# Patient Record
Sex: FEMALE | Race: WHITE | Employment: FULL TIME | URBAN - METROPOLITAN AREA
[De-identification: names, ages, dates, MRNs, and addresses within clinical notes are randomized per-mention and may not be internally consistent; named-entity substitution may affect disease eponyms.]

---

## 2017-04-05 ENCOUNTER — OFFICE VISIT (OUTPATIENT)
Dept: NEUROLOGY | Age: 37
End: 2017-04-05
Payer: MEDICARE

## 2017-04-05 VITALS
DIASTOLIC BLOOD PRESSURE: 92 MMHG | SYSTOLIC BLOOD PRESSURE: 130 MMHG | BODY MASS INDEX: 50.2 KG/M2 | WEIGHT: 249 LBS | HEART RATE: 64 BPM | HEIGHT: 59 IN

## 2017-04-05 DIAGNOSIS — R51.9 HEADACHE DISORDER: Primary | ICD-10-CM

## 2017-04-05 PROCEDURE — 99214 OFFICE O/P EST MOD 30 MIN: CPT | Performed by: PSYCHIATRY & NEUROLOGY

## 2017-04-05 RX ORDER — SUMATRIPTAN 25 MG/1
25 TABLET, FILM COATED ORAL
Qty: 9 TABLET | Refills: 3 | Status: SHIPPED | OUTPATIENT
Start: 2017-04-05 | End: 2017-05-26 | Stop reason: ALTCHOICE

## 2017-04-05 RX ORDER — TOPIRAMATE 50 MG/1
TABLET, FILM COATED ORAL
Qty: 135 TABLET | Refills: 3 | Status: SHIPPED | OUTPATIENT
Start: 2017-04-05 | End: 2017-05-16 | Stop reason: SDUPTHER

## 2017-05-16 RX ORDER — TOPIRAMATE 50 MG/1
75 TABLET, FILM COATED ORAL 2 TIMES DAILY
Qty: 270 TABLET | Refills: 0 | Status: SHIPPED | OUTPATIENT
Start: 2017-05-16 | End: 2018-09-05 | Stop reason: ALTCHOICE

## 2017-05-16 RX ORDER — PREDNISONE 20 MG/1
TABLET ORAL
Qty: 18 TABLET | Refills: 0 | Status: SHIPPED | OUTPATIENT
Start: 2017-05-16 | End: 2017-05-26 | Stop reason: ALTCHOICE

## 2017-05-26 ENCOUNTER — OFFICE VISIT (OUTPATIENT)
Dept: INTERNAL MEDICINE | Age: 37
End: 2017-05-26
Payer: MEDICARE

## 2017-05-26 VITALS
RESPIRATION RATE: 18 BRPM | SYSTOLIC BLOOD PRESSURE: 127 MMHG | WEIGHT: 250 LBS | HEART RATE: 74 BPM | HEIGHT: 58 IN | BODY MASS INDEX: 52.48 KG/M2 | TEMPERATURE: 98.1 F | DIASTOLIC BLOOD PRESSURE: 93 MMHG

## 2017-05-26 DIAGNOSIS — K21.9 GASTROESOPHAGEAL REFLUX DISEASE, ESOPHAGITIS PRESENCE NOT SPECIFIED: ICD-10-CM

## 2017-05-26 DIAGNOSIS — I10 ESSENTIAL HYPERTENSION: Primary | ICD-10-CM

## 2017-05-26 DIAGNOSIS — G44.229 CHRONIC TENSION-TYPE HEADACHE, NOT INTRACTABLE: ICD-10-CM

## 2017-05-26 DIAGNOSIS — R73.03 PREDIABETES: ICD-10-CM

## 2017-05-26 PROCEDURE — 99215 OFFICE O/P EST HI 40 MIN: CPT | Performed by: INTERNAL MEDICINE

## 2017-05-26 RX ORDER — RANITIDINE 150 MG/1
150 TABLET ORAL 2 TIMES DAILY
Qty: 60 TABLET | Refills: 3 | Status: SHIPPED | OUTPATIENT
Start: 2017-05-26 | End: 2018-09-05 | Stop reason: ALTCHOICE

## 2017-05-26 RX ORDER — HYDROCHLOROTHIAZIDE 12.5 MG/1
12.5 CAPSULE, GELATIN COATED ORAL DAILY
Qty: 30 CAPSULE | Refills: 5 | Status: SHIPPED | OUTPATIENT
Start: 2017-05-26 | End: 2018-08-31 | Stop reason: ALTCHOICE

## 2017-05-26 ASSESSMENT — ENCOUNTER SYMPTOMS
ALLERGIC/IMMUNOLOGIC NEGATIVE: 1
EYES NEGATIVE: 1
RESPIRATORY NEGATIVE: 1

## 2017-05-30 ENCOUNTER — HOSPITAL ENCOUNTER (OUTPATIENT)
Age: 37
Setting detail: SPECIMEN
Discharge: HOME | End: 2017-05-30
Payer: MEDICARE

## 2017-05-30 DIAGNOSIS — I10 ESSENTIAL HYPERTENSION: ICD-10-CM

## 2017-05-30 DIAGNOSIS — G44.229 CHRONIC TENSION-TYPE HEADACHE, NOT INTRACTABLE: ICD-10-CM

## 2017-05-30 DIAGNOSIS — K21.9 GASTROESOPHAGEAL REFLUX DISEASE, ESOPHAGITIS PRESENCE NOT SPECIFIED: ICD-10-CM

## 2017-05-30 DIAGNOSIS — R73.03 PREDIABETES: ICD-10-CM

## 2017-05-30 LAB
ANION GAP SERPL CALCULATED.3IONS-SCNC: 16 MMOL/L (ref 9–17)
BUN BLDV-MCNC: 15 MG/DL (ref 6–20)
BUN/CREAT BLD: ABNORMAL (ref 9–20)
CALCIUM SERPL-MCNC: 8.9 MG/DL (ref 8.6–10.4)
CHLORIDE BLD-SCNC: 100 MMOL/L (ref 98–107)
CHOLESTEROL/HDL RATIO: 3.7
CHOLESTEROL: 176 MG/DL
CO2: 18 MMOL/L (ref 20–31)
CREAT SERPL-MCNC: 0.54 MG/DL (ref 0.5–0.9)
ESTIMATED AVERAGE GLUCOSE: 88 MG/DL
GFR AFRICAN AMERICAN: >60 ML/MIN
GFR NON-AFRICAN AMERICAN: >60 ML/MIN
GFR SERPL CREATININE-BSD FRML MDRD: ABNORMAL ML/MIN/{1.73_M2}
GFR SERPL CREATININE-BSD FRML MDRD: ABNORMAL ML/MIN/{1.73_M2}
GLUCOSE BLD-MCNC: 100 MG/DL (ref 70–99)
HBA1C MFR BLD: 4.7 % (ref 4–6)
HDLC SERPL-MCNC: 47 MG/DL
LDL CHOLESTEROL: 88 MG/DL (ref 0–130)
POTASSIUM SERPL-SCNC: 3.5 MMOL/L (ref 3.7–5.3)
SODIUM BLD-SCNC: 134 MMOL/L (ref 135–144)
TRIGL SERPL-MCNC: 207 MG/DL
TSH SERPL DL<=0.05 MIU/L-ACNC: 1.94 MIU/L (ref 0.3–5)
VLDLC SERPL CALC-MCNC: ABNORMAL MG/DL (ref 1–30)

## 2017-05-30 PROCEDURE — 83036 HEMOGLOBIN GLYCOSYLATED A1C: CPT

## 2017-05-30 PROCEDURE — 36415 COLL VENOUS BLD VENIPUNCTURE: CPT

## 2017-05-30 PROCEDURE — 80048 BASIC METABOLIC PNL TOTAL CA: CPT

## 2017-05-30 PROCEDURE — 84443 ASSAY THYROID STIM HORMONE: CPT

## 2017-05-30 PROCEDURE — 80061 LIPID PANEL: CPT

## 2017-05-30 RX ORDER — POTASSIUM CHLORIDE 20 MEQ/1
20 TABLET, EXTENDED RELEASE ORAL DAILY
Qty: 30 TABLET | Refills: 3 | Status: SHIPPED | OUTPATIENT
Start: 2017-05-30 | End: 2017-06-23 | Stop reason: SDUPTHER

## 2017-06-23 ENCOUNTER — OFFICE VISIT (OUTPATIENT)
Dept: INTERNAL MEDICINE | Age: 37
End: 2017-06-23
Payer: MEDICARE

## 2017-06-23 VITALS
HEART RATE: 135 BPM | SYSTOLIC BLOOD PRESSURE: 122 MMHG | DIASTOLIC BLOOD PRESSURE: 89 MMHG | BODY MASS INDEX: 50.79 KG/M2 | RESPIRATION RATE: 18 BRPM | WEIGHT: 243 LBS

## 2017-06-23 DIAGNOSIS — E66.01 MORBID OBESITY DUE TO EXCESS CALORIES (HCC): ICD-10-CM

## 2017-06-23 DIAGNOSIS — I10 ESSENTIAL HYPERTENSION: Primary | ICD-10-CM

## 2017-06-23 PROCEDURE — 99213 OFFICE O/P EST LOW 20 MIN: CPT | Performed by: INTERNAL MEDICINE

## 2017-06-23 RX ORDER — POTASSIUM CHLORIDE 750 MG/1
10 TABLET, EXTENDED RELEASE ORAL DAILY
Qty: 30 TABLET | Refills: 11 | Status: SHIPPED | OUTPATIENT
Start: 2017-06-23 | End: 2018-08-31 | Stop reason: ALTCHOICE

## 2017-06-23 ASSESSMENT — ENCOUNTER SYMPTOMS
GASTROINTESTINAL NEGATIVE: 1
RESPIRATORY NEGATIVE: 1
EYES NEGATIVE: 1
ALLERGIC/IMMUNOLOGIC NEGATIVE: 1

## 2018-08-30 ENCOUNTER — HOSPITAL ENCOUNTER (EMERGENCY)
Age: 38
Discharge: HOME | End: 2018-08-30
Attending: EMERGENCY MEDICINE
Payer: MEDICARE

## 2018-08-30 ENCOUNTER — APPOINTMENT (OUTPATIENT)
Dept: GENERAL RADIOLOGY | Age: 38
End: 2018-08-30
Payer: MEDICARE

## 2018-08-30 VITALS
SYSTOLIC BLOOD PRESSURE: 137 MMHG | RESPIRATION RATE: 16 BRPM | OXYGEN SATURATION: 94 % | HEART RATE: 83 BPM | BODY MASS INDEX: 41.93 KG/M2 | WEIGHT: 208 LBS | TEMPERATURE: 98.2 F | HEIGHT: 59 IN | DIASTOLIC BLOOD PRESSURE: 87 MMHG

## 2018-08-30 DIAGNOSIS — S93.601A SPRAIN OF RIGHT FOOT, INITIAL ENCOUNTER: Primary | ICD-10-CM

## 2018-08-30 PROCEDURE — 99283 EMERGENCY DEPT VISIT LOW MDM: CPT

## 2018-08-30 PROCEDURE — 73630 X-RAY EXAM OF FOOT: CPT

## 2018-08-30 PROCEDURE — 6370000000 HC RX 637 (ALT 250 FOR IP): Performed by: PHYSICIAN ASSISTANT

## 2018-08-30 RX ORDER — IBUPROFEN 800 MG/1
800 TABLET ORAL EVERY 8 HOURS PRN
Qty: 20 TABLET | Refills: 0 | Status: SHIPPED | OUTPATIENT
Start: 2018-08-30 | End: 2018-09-05 | Stop reason: ALTCHOICE

## 2018-08-30 RX ORDER — IBUPROFEN 800 MG/1
800 TABLET ORAL ONCE
Status: COMPLETED | OUTPATIENT
Start: 2018-08-30 | End: 2018-08-30

## 2018-08-30 RX ADMIN — IBUPROFEN 800 MG: 800 TABLET ORAL at 19:51

## 2018-08-30 ASSESSMENT — ENCOUNTER SYMPTOMS
COLOR CHANGE: 0
ABDOMINAL PAIN: 0
WHEEZING: 0
COUGH: 0
NAUSEA: 0
VOMITING: 0

## 2018-08-30 ASSESSMENT — PAIN DESCRIPTION - FREQUENCY: FREQUENCY: CONTINUOUS

## 2018-08-30 ASSESSMENT — PAIN DESCRIPTION - ORIENTATION: ORIENTATION: RIGHT

## 2018-08-30 ASSESSMENT — PAIN SCALES - GENERAL
PAINLEVEL_OUTOF10: 4
PAINLEVEL_OUTOF10: 6

## 2018-08-30 ASSESSMENT — PAIN DESCRIPTION - LOCATION: LOCATION: FOOT

## 2018-08-30 ASSESSMENT — PAIN DESCRIPTION - DESCRIPTORS: DESCRIPTORS: SHARP

## 2018-08-30 ASSESSMENT — PAIN DESCRIPTION - PAIN TYPE: TYPE: ACUTE PAIN

## 2018-08-30 NOTE — ED PROVIDER NOTES
[facility]     Emergency Department     Faculty Attestation    I performed a history and physical examination of the patient and discussed management with the resident. I reviewed the residents note and agree with the documented findings including all diagnostic interpretations and plan of care. Any areas of disagreement are noted on the chart. I was personally present for the key portions of any procedures. I have documented in the chart those procedures where I was not present during the key portions. I have reviewed the emergency nurses triage note. I agree with the chief complaint, past medical history, past surgical history, allergies, medications, social and family history as documented unless otherwise noted below. Documentation of the HPI, Physical Exam and Medical Decision Making performed by scribkylie is based on my personal performance of the HPI, PE and MDM. For Physician Assistant/ Nurse Practitioner cases/documentation I have personally evaluated this patient and have completed at least one if not all key elements of the E/M (history, physical exam, and MDM). Additional findings are as noted. Primary Care Physician: Kyle William MD    History: This is a 40 y.o. female who presents to the Emergency Department with complaint of foot pain. Patient jumped out of her truck and landed on uneven ground twisting and falling towards her lright. No other injuries. Has been walking on the foot but has been increasingly painful and swelling. No blood thinners. Physical:     height is 4' 11\" (1.499 m) and weight is 208 lb (94.3 kg). Her oral temperature is 98.2 °F (36.8 °C). Her blood pressure is 137/87 and her pulse is 83. Her respiration is 16 and oxygen saturation is 94%.    40 y.o. female no acute distress, right foot shows tenderness, swelling over the calcaneus and lateral malleolus, DP pulse 2+, sensation intact in all 5 toes.   Able to wiggle toes without difficulty.     Impression: Foot injury    Plan: X-ray, analgesia      Era Cooper MD  Attending Emergency Physician        Jonathan Sarah MD  08/30/18 3884

## 2018-08-31 ENCOUNTER — OFFICE VISIT (OUTPATIENT)
Dept: INTERNAL MEDICINE | Age: 38
End: 2018-08-31
Payer: MEDICARE

## 2018-08-31 VITALS
BODY MASS INDEX: 52.01 KG/M2 | DIASTOLIC BLOOD PRESSURE: 88 MMHG | WEIGHT: 258 LBS | SYSTOLIC BLOOD PRESSURE: 152 MMHG | HEART RATE: 83 BPM | HEIGHT: 59 IN

## 2018-08-31 DIAGNOSIS — G62.9 NEUROPATHY: ICD-10-CM

## 2018-08-31 DIAGNOSIS — E66.01 OBESITIES, MORBID (HCC): ICD-10-CM

## 2018-08-31 DIAGNOSIS — R35.0 URINARY FREQUENCY: ICD-10-CM

## 2018-08-31 DIAGNOSIS — I10 ESSENTIAL HYPERTENSION: Primary | ICD-10-CM

## 2018-08-31 LAB
BILIRUBIN, POC: ABNORMAL
BLOOD URINE, POC: ABNORMAL
CLARITY, POC: ABNORMAL
COLOR, POC: YELLOW
GLUCOSE URINE, POC: ABNORMAL
HBA1C MFR BLD: 5.3 %
KETONES, POC: ABNORMAL
LEUKOCYTE EST, POC: ABNORMAL
NITRITE, POC: ABNORMAL
PH, POC: 6
PROTEIN, POC: ABNORMAL
SPECIFIC GRAVITY, POC: 1.02
UROBILINOGEN, POC: 1

## 2018-08-31 PROCEDURE — 1036F TOBACCO NON-USER: CPT | Performed by: STUDENT IN AN ORGANIZED HEALTH CARE EDUCATION/TRAINING PROGRAM

## 2018-08-31 PROCEDURE — G8417 CALC BMI ABV UP PARAM F/U: HCPCS | Performed by: STUDENT IN AN ORGANIZED HEALTH CARE EDUCATION/TRAINING PROGRAM

## 2018-08-31 PROCEDURE — 99211 OFF/OP EST MAY X REQ PHY/QHP: CPT | Performed by: INTERNAL MEDICINE

## 2018-08-31 PROCEDURE — 99214 OFFICE O/P EST MOD 30 MIN: CPT | Performed by: STUDENT IN AN ORGANIZED HEALTH CARE EDUCATION/TRAINING PROGRAM

## 2018-08-31 PROCEDURE — G8427 DOCREV CUR MEDS BY ELIG CLIN: HCPCS | Performed by: STUDENT IN AN ORGANIZED HEALTH CARE EDUCATION/TRAINING PROGRAM

## 2018-08-31 PROCEDURE — 83037 HB GLYCOSYLATED A1C HOME DEV: CPT | Performed by: STUDENT IN AN ORGANIZED HEALTH CARE EDUCATION/TRAINING PROGRAM

## 2018-08-31 PROCEDURE — 81002 URINALYSIS NONAUTO W/O SCOPE: CPT | Performed by: STUDENT IN AN ORGANIZED HEALTH CARE EDUCATION/TRAINING PROGRAM

## 2018-08-31 RX ORDER — LISINOPRIL 5 MG/1
5 TABLET ORAL DAILY
Qty: 30 TABLET | Refills: 3 | Status: SHIPPED | OUTPATIENT
Start: 2018-08-31 | End: 2018-09-19

## 2018-08-31 RX ORDER — CEPHALEXIN 500 MG/1
500 CAPSULE ORAL 2 TIMES DAILY
Qty: 14 CAPSULE | Refills: 0 | Status: SHIPPED | OUTPATIENT
Start: 2018-08-31 | End: 2018-09-05 | Stop reason: ALTCHOICE

## 2018-08-31 ASSESSMENT — PATIENT HEALTH QUESTIONNAIRE - PHQ9
2. FEELING DOWN, DEPRESSED OR HOPELESS: 0
1. LITTLE INTEREST OR PLEASURE IN DOING THINGS: 0
SUM OF ALL RESPONSES TO PHQ9 QUESTIONS 1 & 2: 0
SUM OF ALL RESPONSES TO PHQ QUESTIONS 1-9: 0
SUM OF ALL RESPONSES TO PHQ QUESTIONS 1-9: 0

## 2018-08-31 NOTE — PROGRESS NOTES
Visit Information    Have you changed or started any medications since your last visit including any over-the-counter medicines, vitamins, or herbal medicines? no   Are you having any side effects from any of your medications? -  no  Have you stopped taking any of your medications? Is so, why? -  yes - no refills    Have you seen any other physician or provider since your last visit? No  Have you had any other diagnostic tests since your last visit? Yes - Records Obtained  Have you been seen in the emergency room and/or had an admission to a hospital since we last saw you? Yes - Records Obtained  Have you had your routine dental cleaning in the past 6 months? no    Have you activated your [employer] account? If not, what are your barriers?  No: inactive     Patient Care Team:  Manav Magallon MD as PCP - General (Internal Medicine)    Medical History Review  Past Medical, Family, and Social History reviewed and does not contribute to the patient presenting condition    Health Maintenance   Topic Date Due    Cervical cancer screen  12/12/2002    Flu vaccine (1) 09/01/2018    DTaP/Tdap/Td vaccine (2 - Td) 06/30/2023    HIV screen  Completed
She has never used smokeless tobacco.    FAMILY HISTORY:    Reviewed and No change from previous visit    REVIEW OF SYSTEMS:    ENT: negative  Respiratory: no cough, shortness of breath, or wheezing  Cardiovascular: no chest pain or dyspnea on exertion  Gastrointestinal: no abdominal pain, black or bloody stools  Neurological: no TIA or stroke symptoms  Endocrine: negative  Genito-Urinary: no dysuria, increased frequency, nocturia  Musculoskeletal: pain in the lower extremities  Dermatological: negative    PHYSICAL EXAM:      Vitals:    08/31/18 1256   BP: (!) 152/88   Pulse: 83     General appearance - alert, well appearing, and in no distress  Mental status - alert, oriented to person, place, and time  Eyes - pupils equal and reactive, extraocular eye movements intact  Ears - bilateral TM's and external ear canals normal  Nose - normal and patent, no erythema, discharge or polyps  Mouth - mucous membranes moist, pharynx normal without lesions  Neck - supple, no significant adenopathy  Lymphatics - no palpable lymphadenopathy, no hepatosplenomegaly  Chest - clear to auscultation, no wheezes, rales or rhonchi, symmetric air entry  Heart - normal rate, regular rhythm, normal S1, S2, no murmurs, rubs, clicks or gallops  Abdomen - soft, nontender, nondistended, no masses or organomegaly  Back exam - full range of motion, no tenderness, palpable spasm or pain on motion  Neurological - alert, oriented, normal speech, no focal findings    LABORATORY FINDINGS:    CBC:  Lab Results   Component Value Date    WBC 11.2 07/06/2016    HGB 14.7 07/06/2016     07/06/2016     BMP:    Lab Results   Component Value Date     05/30/2017    K 3.5 05/30/2017     05/30/2017    CO2 18 05/30/2017    BUN 15 05/30/2017    CREATININE 0.54 05/30/2017    GLUCOSE 100 05/30/2017     HEMOGLOBIN A1C:   Lab Results   Component Value Date    LABA1C 4.7 05/30/2017     MICROALBUMIN URINE: No results found for: MICROALBUR  FASTING
mistakes which may not have been identified and corrected by editing.

## 2018-08-31 NOTE — ED PROVIDER NOTES
program.  Efforts were made to edit the dictations but occasionally words are mis-transcribed.)       Cirilo Meneses PA-C  08/30/18 5473

## 2018-08-31 NOTE — PATIENT INSTRUCTIONS
RTC on 9/28/18   Medications e-scribe to pharmacy of pt's choice. Scripts for lab given to pt with fasting instructions, pt will get labs done before next appt. Referral for nerology sent to [facility]  they will call pt for appt, copy of referral with number and address given to pt. Pt should call referral number if not heard from within a couple of weeks. Referral for Bariatrics sent to [facility]  they will call pt for appt, copy of referral with number and address given to pt. Pt should call referral number if not heard from within a couple of weeks. An After Visit Summary was printed and given to the patient.  YNES

## 2018-09-05 ENCOUNTER — OFFICE VISIT (OUTPATIENT)
Dept: NEUROLOGY | Age: 38
End: 2018-09-05
Payer: MEDICARE

## 2018-09-05 ENCOUNTER — TELEPHONE (OUTPATIENT)
Dept: ADMINISTRATIVE | Age: 38
End: 2018-09-05

## 2018-09-05 VITALS
SYSTOLIC BLOOD PRESSURE: 138 MMHG | HEART RATE: 83 BPM | BODY MASS INDEX: 54.28 KG/M2 | WEIGHT: 258.6 LBS | DIASTOLIC BLOOD PRESSURE: 89 MMHG | HEIGHT: 58 IN

## 2018-09-05 DIAGNOSIS — R35.0 URINARY FREQUENCY: ICD-10-CM

## 2018-09-05 DIAGNOSIS — R20.0 NUMBNESS: Primary | ICD-10-CM

## 2018-09-05 PROCEDURE — 99214 OFFICE O/P EST MOD 30 MIN: CPT | Performed by: PSYCHIATRY & NEUROLOGY

## 2018-09-05 PROCEDURE — 1036F TOBACCO NON-USER: CPT | Performed by: PSYCHIATRY & NEUROLOGY

## 2018-09-05 PROCEDURE — G8427 DOCREV CUR MEDS BY ELIG CLIN: HCPCS | Performed by: PSYCHIATRY & NEUROLOGY

## 2018-09-05 PROCEDURE — G8417 CALC BMI ABV UP PARAM F/U: HCPCS | Performed by: PSYCHIATRY & NEUROLOGY

## 2018-09-05 RX ORDER — TOPIRAMATE 50 MG/1
TABLET, FILM COATED ORAL
Qty: 60 TABLET | Refills: 2 | Status: SHIPPED | OUTPATIENT
Start: 2018-09-05 | End: 2019-01-04 | Stop reason: SDUPTHER

## 2018-09-05 NOTE — PROGRESS NOTES
Constitutional Weight: present, Appetite: absent, Fatigue:absent   HEENT Ears: normal,  Eyes: glasses, Visual disturbance: absent   Reespiratory Shortness of breath: absent, Cough: absent   Cardivascular Chest pain: present, Leg swelling :present   GI Constipation: absent, Diarrhea: absent, Swallowing change: absent    Urinary frequency:absent, Urinary urgency: absent, Urinary incontinence: absent   Musculoskeletal Neck pain: absent, Back pain: absent, Stiffness: absent, Muscle pain: absent, Joint pain: present, Restless legs: absent   Dermatological Hair loss: absent, Skin changes: absent   Neurological Memory loss: present, Confusion: absent, Seizures: absent, Trouble walking or imbalance: present, Dizziness: present, Weakness: present, Numbness: present Tremor:present, Spasm: present, Speech difficulty: present, Headache: present, Light sensitivity: present   Psychiatric Anxiety: absent, Hallucination: absent, Mood disorder: absent   Hematologic Abnormal bleeding: absent, Anemia: absent, Clotting disorder: absent, Lymph gland changes: absent

## 2018-09-05 NOTE — PROGRESS NOTES
[facility]  [facility], [address], [address] [place] [address]  Ph: [phone] or [phone]  FAX: [phone]    Nithin Castaneda M.D.  Juliette Juarez M.D. Griffin Goncalves M.D. Evy Fink M.D. Dear Dr. Osei Romano MD   The patient comes in today as a follow-up visit. She has a previous history of medically intractable migraines. Previously she was taking topiramate. She states that in July 2017, she stopped her medications. Overall she reports having worsening of the headaches. Additionally she has been having lack of waiting in the feet, increased swelling in the lower extremities. Her headaches almost total 3 times a week. She does not report any change in the nature of the headaches. The patient is unsure of the reason she stopped taking topiramate. She does not remember having a ill effects from the medication.     Neurological work up:  MRI brain July 2016 normal    Lab Results   Component Value Date    LDLCHOLESTEROL 88 05/30/2017     No components found for: CHLPL  Lab Results   Component Value Date    TRIG 207 (H) 05/30/2017    TRIG 264 (H) 09/23/2015     Lab Results   Component Value Date    HDL 47 05/30/2017    HDL 34 (L) 09/23/2015     No results found for: LDLCALC  No results found for: LABVLDL  Lab Results   Component Value Date    LABA1C 5.3 08/31/2018     Lab Results   Component Value Date    EAG 88 05/30/2017     Lab Results   Component Value Date    YFCTRTAW00 420 09/23/2015          General examination:    Head: Normocephalic, atraumatic  Eyes: Extraocular movements intact  Lungs: Respirations unlabored, chest wall no deformity  ENT: Normal external ear canals, no sinus tenderness  Heart: Regular rate rhythm  Abdomen: No masses, tenderness  Extremities: No cyanosis or edema, 2+ pulses  Skin: Intact, normal skin color    Neurological examination:    Mental status   Alert and oriented; intact memory with no confusion, speech M.D.          Neurology        This note is created with the assistance of a speech-recognition program. While intending to generate a document that actually reflects the content of the visit, the document can still have some errors including those of syntax and sound a- like substitutions which may escape proofreading. In such instances, actual meaning can be extrapolated by contextual derivation.

## 2018-09-06 DIAGNOSIS — R35.0 URINARY FREQUENCY: ICD-10-CM

## 2018-09-06 RX ORDER — CEPHALEXIN 500 MG/1
500 CAPSULE ORAL 2 TIMES DAILY
Qty: 14 CAPSULE | Refills: 0 | Status: SHIPPED | OUTPATIENT
Start: 2018-09-06 | End: 2018-09-13

## 2018-09-19 ENCOUNTER — OFFICE VISIT (OUTPATIENT)
Dept: INTERNAL MEDICINE | Age: 38
End: 2018-09-19
Payer: MEDICARE

## 2018-09-19 VITALS
HEART RATE: 74 BPM | WEIGHT: 253 LBS | DIASTOLIC BLOOD PRESSURE: 83 MMHG | HEIGHT: 59 IN | BODY MASS INDEX: 51 KG/M2 | SYSTOLIC BLOOD PRESSURE: 129 MMHG

## 2018-09-19 DIAGNOSIS — G62.9 NEUROPATHY: ICD-10-CM

## 2018-09-19 DIAGNOSIS — Z23 FLU VACCINE NEED: ICD-10-CM

## 2018-09-19 DIAGNOSIS — E66.01 OBESITIES, MORBID (HCC): ICD-10-CM

## 2018-09-19 DIAGNOSIS — G89.29 CHRONIC HEEL PAIN, RIGHT: ICD-10-CM

## 2018-09-19 DIAGNOSIS — M79.671 CHRONIC HEEL PAIN, RIGHT: ICD-10-CM

## 2018-09-19 DIAGNOSIS — I10 ESSENTIAL HYPERTENSION: Primary | ICD-10-CM

## 2018-09-19 PROCEDURE — 90686 IIV4 VACC NO PRSV 0.5 ML IM: CPT | Performed by: INTERNAL MEDICINE

## 2018-09-19 PROCEDURE — G8417 CALC BMI ABV UP PARAM F/U: HCPCS | Performed by: INTERNAL MEDICINE

## 2018-09-19 PROCEDURE — 1036F TOBACCO NON-USER: CPT | Performed by: INTERNAL MEDICINE

## 2018-09-19 PROCEDURE — G8427 DOCREV CUR MEDS BY ELIG CLIN: HCPCS | Performed by: INTERNAL MEDICINE

## 2018-09-19 PROCEDURE — 99213 OFFICE O/P EST LOW 20 MIN: CPT | Performed by: INTERNAL MEDICINE

## 2018-09-19 PROCEDURE — 99211 OFF/OP EST MAY X REQ PHY/QHP: CPT | Performed by: INTERNAL MEDICINE

## 2018-09-19 RX ORDER — BLOOD PRESSURE TEST KIT
1 KIT MISCELLANEOUS DAILY
Qty: 1 KIT | Refills: 0 | Status: SHIPPED | OUTPATIENT
Start: 2018-09-19 | End: 2019-01-04 | Stop reason: SDUPTHER

## 2018-09-19 RX ORDER — HYDROCHLOROTHIAZIDE 12.5 MG/1
12.5 CAPSULE, GELATIN COATED ORAL DAILY
Qty: 30 CAPSULE | Refills: 3 | Status: SHIPPED | OUTPATIENT
Start: 2018-09-19 | End: 2019-01-04 | Stop reason: SDUPTHER

## 2018-09-19 NOTE — PATIENT INSTRUCTIONS
Patient Education        Plantar Fasciitis: Exercises  Your Care Instructions  Here are some examples of typical rehabilitation exercises for your condition. Start each exercise slowly. Ease off the exercise if you start to have pain. Your doctor or physical therapist will tell you when you can start these exercises and which ones will work best for you. How to do the exercises  Towel stretch    1. Sit with your legs extended and knees straight. 2. Place a towel around your foot just under the toes. 3. Hold each end of the towel in each hand, with your hands above your knees. 4. Pull back with the towel so that your foot stretches toward you. 5. Hold the position for at least 15 to 30 seconds. 6. Repeat 2 to 4 times a session, up to 5 sessions a day. Calf stretch    This exercise stretches the muscles at the back of the lower leg (the calf) and the Achilles tendon. Do this exercise 3 or 4 times a day, 5 days a week. 1. Stand facing a wall with your hands on the wall at about eye level. Put the leg you want to stretch about a step behind your other leg. 2. Keeping your back heel on the floor, bend your front knee until you feel a stretch in the back leg. 3. Hold the stretch for 15 to 30 seconds. Repeat 2 to 4 times. Plantar fascia and calf stretch    Stretching the plantar fascia and calf muscles can increase flexibility and decrease heel pain. You can do this exercise several times each day and before and after activity. 1. Stand on a step as shown above. Be sure to hold on to the banister. 2. Slowly let your heels down over the edge of the step as you relax your calf muscles. You should feel a gentle stretch across the bottom of your foot and up the back of your leg to your knee. 3. Hold the stretch about 15 to 30 seconds, and then tighten your calf muscle a little to bring your heel back up to the level of the step. Repeat 2 to 4 times.   Towel curls    Make this exercise more challenging by placing a weighted object, such as a soup can, on the other end of the towel. 1. While sitting, place your foot on a towel on the floor and scrunch the towel toward you with your toes. 2. Then, also using your toes, push the towel away from you. Treadwell pickups    1. Put marbles on the floor next to a cup.  2. Using your toes, try to lift the marbles up from the floor and put them in the cup. Follow-up care is a key part of your treatment and safety. Be sure to make and go to all appointments, and call your doctor if you are having problems. It's also a good idea to know your test results and keep a list of the medicines you take. Where can you learn more? Go to https://[employer]peYoviaeb.Mint. org and sign in to your [employer] account. Enter S868 in the PerkHub box to learn more about \"Plantar Fasciitis: Exercises. \"     If you do not have an account, please click on the \"Sign Up Now\" link. Current as of: November 29, 2017  Content Version: 11.7  © 4206-4690 [employer]. Care instructions adapted under license by [facility] ([facility]). If you have questions about a medical condition or this instruction, always ask your healthcare professional. [address] any warranty or liability for your use of this information. Your Podiatry referral has been sent to [address] [place] [address]. It is located at [address] [facility],  [address]. The office phone number is [phone]. You were given the flu vaccine today    Your script for blood pressure cuff has been printed and given to you, you can take it to the pharmacy of your choice.

## 2018-09-24 ENCOUNTER — TELEPHONE (OUTPATIENT)
Dept: INTERNAL MEDICINE | Age: 38
End: 2018-09-24

## 2018-09-24 NOTE — TELEPHONE ENCOUNTER
Janneth Wagner from [employer] called wanted pt OV from 9/19/2018 faxed to her [phone] fax was sent

## 2018-09-25 ENCOUNTER — HOSPITAL ENCOUNTER (OUTPATIENT)
Age: 38
Setting detail: SPECIMEN
Discharge: HOME | End: 2018-09-25
Payer: MEDICARE

## 2018-09-25 DIAGNOSIS — I10 ESSENTIAL HYPERTENSION: ICD-10-CM

## 2018-09-25 DIAGNOSIS — G62.9 NEUROPATHY: ICD-10-CM

## 2018-09-25 LAB
-: ABNORMAL
ALBUMIN SERPL-MCNC: 4.3 G/DL (ref 3.5–5.2)
ALBUMIN/GLOBULIN RATIO: 1.3 (ref 1–2.5)
ALP BLD-CCNC: 59 U/L (ref 35–104)
ALT SERPL-CCNC: 16 U/L (ref 5–33)
AMORPHOUS: ABNORMAL
ANION GAP SERPL CALCULATED.3IONS-SCNC: 15 MMOL/L (ref 9–17)
AST SERPL-CCNC: 13 U/L
BACTERIA: ABNORMAL
BILIRUB SERPL-MCNC: 0.63 MG/DL (ref 0.3–1.2)
BILIRUBIN URINE: NEGATIVE
BUN BLDV-MCNC: 13 MG/DL (ref 6–20)
BUN/CREAT BLD: ABNORMAL (ref 9–20)
CALCIUM SERPL-MCNC: 9.2 MG/DL (ref 8.6–10.4)
CASTS UA: ABNORMAL /LPF (ref 0–8)
CHLORIDE BLD-SCNC: 104 MMOL/L (ref 98–107)
CHOLESTEROL/HDL RATIO: 4.5
CHOLESTEROL: 158 MG/DL
CO2: 19 MMOL/L (ref 20–31)
COLOR: YELLOW
COMMENT UA: ABNORMAL
CREAT SERPL-MCNC: 0.49 MG/DL (ref 0.5–0.9)
CRYSTALS, UA: ABNORMAL /HPF
EPITHELIAL CELLS UA: ABNORMAL /HPF (ref 0–5)
GFR AFRICAN AMERICAN: >60 ML/MIN
GFR NON-AFRICAN AMERICAN: >60 ML/MIN
GFR SERPL CREATININE-BSD FRML MDRD: ABNORMAL ML/MIN/{1.73_M2}
GFR SERPL CREATININE-BSD FRML MDRD: ABNORMAL ML/MIN/{1.73_M2}
GLUCOSE BLD-MCNC: 90 MG/DL (ref 70–99)
GLUCOSE URINE: NEGATIVE
HCT VFR BLD CALC: 45.8 % (ref 36.3–47.1)
HDLC SERPL-MCNC: 35 MG/DL
HEMOGLOBIN: 15 G/DL (ref 11.9–15.1)
KETONES, URINE: NEGATIVE
LDL CHOLESTEROL: 75 MG/DL (ref 0–130)
LEUKOCYTE ESTERASE, URINE: ABNORMAL
MCH RBC QN AUTO: 30.9 PG (ref 25.2–33.5)
MCHC RBC AUTO-ENTMCNC: 32.8 G/DL (ref 28.4–34.8)
MCV RBC AUTO: 94.2 FL (ref 82.6–102.9)
MUCUS: ABNORMAL
NITRITE, URINE: NEGATIVE
NRBC AUTOMATED: 0 PER 100 WBC
OTHER OBSERVATIONS UA: ABNORMAL
PDW BLD-RTO: 13.2 % (ref 11.8–14.4)
PH UA: 5.5 (ref 5–8)
PLATELET # BLD: 224 K/UL (ref 138–453)
PMV BLD AUTO: 12.5 FL (ref 8.1–13.5)
POTASSIUM SERPL-SCNC: 3.9 MMOL/L (ref 3.7–5.3)
PROTEIN UA: NEGATIVE
RBC # BLD: 4.86 M/UL (ref 3.95–5.11)
RBC UA: ABNORMAL /HPF (ref 0–4)
RENAL EPITHELIAL, UA: ABNORMAL /HPF
SODIUM BLD-SCNC: 138 MMOL/L (ref 135–144)
SPECIFIC GRAVITY UA: 1.02 (ref 1–1.03)
TOTAL PROTEIN: 7.6 G/DL (ref 6.4–8.3)
TRICHOMONAS: ABNORMAL
TRIGL SERPL-MCNC: 239 MG/DL
TURBIDITY: ABNORMAL
URINE HGB: NEGATIVE
UROBILINOGEN, URINE: NORMAL
VITAMIN B-12: 373 PG/ML (ref 232–1245)
VITAMIN D 25-HYDROXY: 12.3 NG/ML (ref 30–100)
VLDLC SERPL CALC-MCNC: ABNORMAL MG/DL (ref 1–30)
WBC # BLD: 7.4 K/UL (ref 3.5–11.3)
WBC UA: ABNORMAL /HPF (ref 0–5)
YEAST: ABNORMAL

## 2018-09-25 PROCEDURE — 80061 LIPID PANEL: CPT

## 2018-09-25 PROCEDURE — 85027 COMPLETE CBC AUTOMATED: CPT

## 2018-09-25 PROCEDURE — 80053 COMPREHEN METABOLIC PANEL: CPT

## 2018-09-25 PROCEDURE — 82306 VITAMIN D 25 HYDROXY: CPT

## 2018-09-25 PROCEDURE — 36415 COLL VENOUS BLD VENIPUNCTURE: CPT

## 2018-09-25 PROCEDURE — 87086 URINE CULTURE/COLONY COUNT: CPT

## 2018-09-25 PROCEDURE — 81001 URINALYSIS AUTO W/SCOPE: CPT

## 2018-09-25 PROCEDURE — 82607 VITAMIN B-12: CPT

## 2018-09-26 DIAGNOSIS — E55.9 VITAMIN D DEFICIENCY: Primary | ICD-10-CM

## 2018-09-26 LAB
CULTURE: NORMAL
Lab: NORMAL
SPECIMEN DESCRIPTION: NORMAL
STATUS: NORMAL

## 2018-09-26 RX ORDER — ERGOCALCIFEROL 1.25 MG/1
50000 CAPSULE ORAL WEEKLY
Qty: 8 CAPSULE | Refills: 0 | Status: SHIPPED | OUTPATIENT
Start: 2018-09-26 | End: 2018-10-08

## 2018-09-26 RX ORDER — CALCIUM CARBONATE/VITAMIN D3 500 MG-10
1 TABLET ORAL DAILY
Qty: 90 TABLET | Refills: 1 | Status: SHIPPED | OUTPATIENT
Start: 2018-09-26 | End: 2018-10-08

## 2018-10-08 ENCOUNTER — HOSPITAL ENCOUNTER (EMERGENCY)
Age: 38
Discharge: HOME | End: 2018-10-08
Attending: EMERGENCY MEDICINE
Payer: MEDICARE

## 2018-10-08 VITALS
HEART RATE: 78 BPM | BODY MASS INDEX: 59.07 KG/M2 | OXYGEN SATURATION: 98 % | TEMPERATURE: 97.3 F | WEIGHT: 293 LBS | DIASTOLIC BLOOD PRESSURE: 96 MMHG | RESPIRATION RATE: 16 BRPM | HEIGHT: 59 IN | SYSTOLIC BLOOD PRESSURE: 144 MMHG

## 2018-10-08 DIAGNOSIS — S39.012A BACK STRAIN, INITIAL ENCOUNTER: Primary | ICD-10-CM

## 2018-10-08 PROCEDURE — 6370000000 HC RX 637 (ALT 250 FOR IP): Performed by: EMERGENCY MEDICINE

## 2018-10-08 PROCEDURE — 99283 EMERGENCY DEPT VISIT LOW MDM: CPT

## 2018-10-08 RX ORDER — CYCLOBENZAPRINE HCL 10 MG
10 TABLET ORAL ONCE
Status: COMPLETED | OUTPATIENT
Start: 2018-10-08 | End: 2018-10-08

## 2018-10-08 RX ORDER — ACETAMINOPHEN 325 MG/1
650 TABLET ORAL ONCE
Status: COMPLETED | OUTPATIENT
Start: 2018-10-08 | End: 2018-10-08

## 2018-10-08 RX ORDER — ACETAMINOPHEN 500 MG
1000 TABLET ORAL EVERY 8 HOURS PRN
Qty: 30 TABLET | Refills: 0 | Status: SHIPPED | OUTPATIENT
Start: 2018-10-08 | End: 2018-10-19

## 2018-10-08 RX ORDER — CYCLOBENZAPRINE HCL 10 MG
10 TABLET ORAL 3 TIMES DAILY PRN
Qty: 10 TABLET | Refills: 0 | Status: SHIPPED | OUTPATIENT
Start: 2018-10-08 | End: 2018-10-19

## 2018-10-08 RX ORDER — IBUPROFEN 800 MG/1
800 TABLET ORAL EVERY 8 HOURS PRN
Qty: 30 TABLET | Refills: 0 | Status: SHIPPED | OUTPATIENT
Start: 2018-10-08 | End: 2018-10-19

## 2018-10-08 RX ORDER — IBUPROFEN 800 MG/1
800 TABLET ORAL ONCE
Status: COMPLETED | OUTPATIENT
Start: 2018-10-08 | End: 2018-10-08

## 2018-10-08 RX ADMIN — CYCLOBENZAPRINE HYDROCHLORIDE 10 MG: 10 TABLET, FILM COATED ORAL at 07:54

## 2018-10-08 RX ADMIN — ACETAMINOPHEN 650 MG: 325 TABLET ORAL at 07:54

## 2018-10-08 RX ADMIN — IBUPROFEN 800 MG: 800 TABLET ORAL at 07:54

## 2018-10-08 ASSESSMENT — PAIN DESCRIPTION - ORIENTATION: ORIENTATION: MID;LOWER

## 2018-10-08 ASSESSMENT — PAIN SCALES - WONG BAKER: WONGBAKER_NUMERICALRESPONSE: 8

## 2018-10-08 ASSESSMENT — PAIN DESCRIPTION - PAIN TYPE: TYPE: ACUTE PAIN

## 2018-10-08 ASSESSMENT — PAIN DESCRIPTION - DESCRIPTORS: DESCRIPTORS: SPASM

## 2018-10-08 ASSESSMENT — PAIN DESCRIPTION - LOCATION: LOCATION: BACK

## 2018-10-08 ASSESSMENT — PAIN SCALES - GENERAL: PAINLEVEL_OUTOF10: 8

## 2018-10-08 ASSESSMENT — PAIN DESCRIPTION - FREQUENCY: FREQUENCY: CONTINUOUS

## 2018-10-08 NOTE — ED PROVIDER NOTES
[facility]  ED  Emergency Department Encounter  Emergency Medicine Resident Note     Pt Name: Johanny Gould  MRN: [id number]  [place] 1980  Date of evaluation: 10/8/2018  PCP:  Mary Santos MD    CHIEF COMPLAINT       Chief Complaint   Patient presents with    Back Pain       HISTORY OF PRESENT ILLNESS  (Location/Symptom, Timing/Onset, Context/Setting, Quality, Duration, Modifying Factors, Severity.)      Johanny Gould is an obese 40 y.o. female who presents with new onset back pain and spasm since last night. She reached across while making the bed and felt sudden onset pain and tightness in the low back. The pain radiates down the right leg and increases with spasms up to 10/10. She denies numbness/tingling, incontinence, IV drug use, and trauma. PAST MEDICAL / SURGICAL / SOCIAL / FAMILY HISTORY     Past Medical History:  has a past medical history of Gastric reflux; Neuropathy; and Obesities, morbid ([address] ***    Past Surgical History:  has a past surgical history that includes Tubal ligation. ***    Allergies:  Latex     Home Meds:   Prior to Visit Medications    Medication Sig Taking? Authorizing Provider   hydrochlorothiazide (MICROZIDE) 12.5 MG capsule Take 1 capsule by mouth daily Yes Elizabeth Preston MD   topiramate (TOPAMAX) 50 MG tablet 1/2 tab PO BID for 3 days then 50 mg BID Yes Sony Quinn MD   vitamin D (ERGOCALCIFEROL) 13371 units CAPS capsule Take 1 capsule by mouth once a week  Sunny Lopez MD   Calcium Carb-Cholecalciferol (OYSTER SHELL CALCIUM) 500-400 MG-UNIT TABS Take 1 tablet by mouth daily  Sunny Lopez MD   Blood Pressure KIT 1 each by Does not apply route daily Diagnosis Hypertension  Elizabeth Preston MD     Please note that medications prescribed at discharge will auto-populate into this medication list when note is refreshed. Please look at prescription date and prescriber to clarify.     Family History: family history includes Cancer

## 2018-10-09 ASSESSMENT — ENCOUNTER SYMPTOMS
NAUSEA: 0
DIARRHEA: 0
BACK PAIN: 1
SHORTNESS OF BREATH: 0
PHOTOPHOBIA: 0
COLOR CHANGE: 0
COUGH: 0
CONSTIPATION: 0
VOMITING: 0

## 2018-10-09 NOTE — ED PROVIDER NOTES
hydrochlorothiazide (MICROZIDE) 12.5 MG capsule Take 1 capsule by mouth daily Yes Roosevelt Jenkins MD   topiramate (TOPAMAX) 50 MG tablet 1/2 tab PO BID for 3 days then 50 mg BID Yes Pawan Anderson MD   Blood Pressure KIT 1 each by Does not apply route daily Diagnosis Hypertension  Roosevelt Jenkins MD     Please note that medications prescribed at discharge will auto-populate into this medication list when note is refreshed. Please look at prescription date andprescriber to clarify. Family History:family history includes Cancer in her father and maternal grandfather; Diabetes in her father, mother, paternal grandfather, and paternal grandmother; Heart Disease in her father, mother, paternal grandfather, and paternal uncle; High Blood Pressure in her father, mother, paternal grandfather, paternal grandmother, and sister; Kidney Disease in her paternal aunt; Liver Disease in her father; Migraines in her maternal grandmother and mother; Neuropathy in her sister; Other in her paternal grandmother; Stroke in her maternal grandfather; Thyroid Disease in her father, mother, and sister. Social History: She reports that she has never smoked. She has never used smokeless tobacco. She reports that she does not drink alcohol or use drugs. She has no sexual activity history on file. REVIEW OF SYSTEMS    (2-9 systems for level 4, 10 or more for level 5)      Review of Systems   Constitutional: Negative for activity change, chills and fever. HENT: Negative for congestion and tinnitus. Eyes: Negative for photophobia and visual disturbance. Respiratory: Negative for cough and shortness of breath. Cardiovascular: Negative for chest pain. Gastrointestinal: Negative for constipation, diarrhea, nausea and vomiting. Genitourinary: Negative for decreased urine volume and enuresis. Musculoskeletal: Positive for back pain. Negative for arthralgias, myalgias, neck pain and neck stiffness.    Skin: Negative for night after she turned the wrong direction. .  Patient denies any recent trauma. No history of IV drug use. Exam, patient has right-sided paraspinal muscle spasm and tenderness that extends also into the right gluteus region. Normal sensation and strength. Negative straight leg raise and negative crossed leg raise. No focal neurological deficits. No bowel/bladder incontinence. VSS. Based on patient's history and physical exam this is likely a musculoskeletal etiology. There are no clinical indications of spinal cord compression, cauda equina syndrome, infection, aneurysm or other serious etiology. Therefore, there is no indication for further imaging or lab workup at this time. We will provide Motrin, Tylenol, Flexeril in the ED. Discharge with prescription for these medications as well. Instructed not to take Flexeril drives him and make her drowsy. Instructed to call Soo Saavedra MD on the next business day for follow-up within 1 week  Return to the ED if any new neurological findings, bowel/bladder incontinences, worsening pain, or any other concerns arise. Patient given opportunity to ask questions and they are comfortable with discharge home at this time. DIAGNOSTIC RESULTS     EKG: All EKG's are interpreted by the Emergency Department Physician who either signs or Co-signs this chart in the absence of a cardiologist.    Not clinically indicated at this time. LABS: Labswere reviewed by me and abnormal results are displayed above     Labs Reviewed - No data to display    RADIOLOGY: All plain film, CT, MRI, and formal ultrasound images (except ED bedside ultrasound) are read by the radiologist, see reports below, unless otherwise noted in ED Course, MDM or here. No results found. BEDSIDE ULTRASOUND:  Not clinically indicated at this time.       ED COURSE      ED Medication Orders     Start Ordered     Status Ordering Provider    10/08/18 0800 10/08/18 0749  cyclobenzaprine

## 2018-10-11 ENCOUNTER — OFFICE VISIT (OUTPATIENT)
Dept: BARIATRICS/WEIGHT MGMT | Age: 38
End: 2018-10-11
Payer: MEDICARE

## 2018-10-11 VITALS
HEART RATE: 72 BPM | WEIGHT: 256 LBS | HEIGHT: 59 IN | RESPIRATION RATE: 20 BRPM | SYSTOLIC BLOOD PRESSURE: 114 MMHG | BODY MASS INDEX: 51.61 KG/M2 | DIASTOLIC BLOOD PRESSURE: 70 MMHG

## 2018-10-11 DIAGNOSIS — K21.9 GASTROESOPHAGEAL REFLUX DISEASE WITHOUT ESOPHAGITIS: ICD-10-CM

## 2018-10-11 DIAGNOSIS — I10 ESSENTIAL HYPERTENSION: Primary | ICD-10-CM

## 2018-10-11 DIAGNOSIS — R06.83 SNORING: ICD-10-CM

## 2018-10-11 DIAGNOSIS — E66.01 MORBID OBESITY (HCC): ICD-10-CM

## 2018-10-11 PROCEDURE — 99204 OFFICE O/P NEW MOD 45 MIN: CPT | Performed by: SURGERY

## 2018-10-11 PROCEDURE — 1036F TOBACCO NON-USER: CPT | Performed by: SURGERY

## 2018-10-11 PROCEDURE — G8427 DOCREV CUR MEDS BY ELIG CLIN: HCPCS | Performed by: SURGERY

## 2018-10-11 PROCEDURE — G8482 FLU IMMUNIZE ORDER/ADMIN: HCPCS | Performed by: SURGERY

## 2018-10-11 PROCEDURE — G8417 CALC BMI ABV UP PARAM F/U: HCPCS | Performed by: SURGERY

## 2018-10-14 NOTE — PROGRESS NOTES
Nutrition Assessment and Clearance    Pulmonary Evaluation: Obstructive Sleep Apnea Evaluation    Other  Consultations: PCP clearance    Physician Supervised Diet and Exercise required by the patients insurance company: 3 months.       Surgical Diet requirement:  2 weeks    Final Testing  Screening Chest Xray  and EKG within 6 months of date of surgery    Labwork:  Final Lab Tests  within 3 months of date of surgery (CBC, PT/PTT, BMP)     Electronically signed by Belinda Win DO on 10/14/2018 at 2:53 PM

## 2018-10-15 ENCOUNTER — OFFICE VISIT (OUTPATIENT)
Dept: PODIATRY | Age: 38
End: 2018-10-15
Payer: MEDICARE

## 2018-10-15 VITALS
HEART RATE: 88 BPM | DIASTOLIC BLOOD PRESSURE: 88 MMHG | WEIGHT: 254.2 LBS | HEIGHT: 59 IN | SYSTOLIC BLOOD PRESSURE: 122 MMHG | BODY MASS INDEX: 51.24 KG/M2

## 2018-10-15 DIAGNOSIS — M79.672 FOOT PAIN, BILATERAL: ICD-10-CM

## 2018-10-15 DIAGNOSIS — I89.0 LYMPHEDEMA: ICD-10-CM

## 2018-10-15 DIAGNOSIS — M79.671 FOOT PAIN, BILATERAL: ICD-10-CM

## 2018-10-15 DIAGNOSIS — M72.2 PLANTAR FASCIITIS OF RIGHT FOOT: Primary | ICD-10-CM

## 2018-10-15 PROCEDURE — 99202 OFFICE O/P NEW SF 15 MIN: CPT

## 2018-10-15 PROCEDURE — G8417 CALC BMI ABV UP PARAM F/U: HCPCS | Performed by: STUDENT IN AN ORGANIZED HEALTH CARE EDUCATION/TRAINING PROGRAM

## 2018-10-15 PROCEDURE — G8427 DOCREV CUR MEDS BY ELIG CLIN: HCPCS | Performed by: STUDENT IN AN ORGANIZED HEALTH CARE EDUCATION/TRAINING PROGRAM

## 2018-10-15 PROCEDURE — 1036F TOBACCO NON-USER: CPT | Performed by: STUDENT IN AN ORGANIZED HEALTH CARE EDUCATION/TRAINING PROGRAM

## 2018-10-15 PROCEDURE — G8482 FLU IMMUNIZE ORDER/ADMIN: HCPCS | Performed by: STUDENT IN AN ORGANIZED HEALTH CARE EDUCATION/TRAINING PROGRAM

## 2018-10-15 PROCEDURE — 99203 OFFICE O/P NEW LOW 30 MIN: CPT | Performed by: STUDENT IN AN ORGANIZED HEALTH CARE EDUCATION/TRAINING PROGRAM

## 2018-10-15 RX ORDER — IBUPROFEN 800 MG/1
800 TABLET ORAL EVERY 8 HOURS PRN
Qty: 30 TABLET | Refills: 0 | Status: SHIPPED | OUTPATIENT
Start: 2018-10-15 | End: 2018-10-19

## 2018-10-19 ENCOUNTER — OFFICE VISIT (OUTPATIENT)
Dept: INTERNAL MEDICINE | Age: 38
End: 2018-10-19
Payer: MEDICARE

## 2018-10-19 VITALS
HEART RATE: 97 BPM | BODY MASS INDEX: 53.32 KG/M2 | HEIGHT: 58 IN | WEIGHT: 254 LBS | SYSTOLIC BLOOD PRESSURE: 125 MMHG | DIASTOLIC BLOOD PRESSURE: 86 MMHG

## 2018-10-19 DIAGNOSIS — G62.9 NEUROPATHY: ICD-10-CM

## 2018-10-19 DIAGNOSIS — E66.01 OBESITIES, MORBID (HCC): ICD-10-CM

## 2018-10-19 DIAGNOSIS — K21.9 GASTROESOPHAGEAL REFLUX DISEASE, ESOPHAGITIS PRESENCE NOT SPECIFIED: Primary | ICD-10-CM

## 2018-10-19 PROCEDURE — 1036F TOBACCO NON-USER: CPT | Performed by: STUDENT IN AN ORGANIZED HEALTH CARE EDUCATION/TRAINING PROGRAM

## 2018-10-19 PROCEDURE — G8427 DOCREV CUR MEDS BY ELIG CLIN: HCPCS | Performed by: STUDENT IN AN ORGANIZED HEALTH CARE EDUCATION/TRAINING PROGRAM

## 2018-10-19 PROCEDURE — 99213 OFFICE O/P EST LOW 20 MIN: CPT | Performed by: STUDENT IN AN ORGANIZED HEALTH CARE EDUCATION/TRAINING PROGRAM

## 2018-10-19 PROCEDURE — 99211 OFF/OP EST MAY X REQ PHY/QHP: CPT | Performed by: INTERNAL MEDICINE

## 2018-10-19 PROCEDURE — G8417 CALC BMI ABV UP PARAM F/U: HCPCS | Performed by: STUDENT IN AN ORGANIZED HEALTH CARE EDUCATION/TRAINING PROGRAM

## 2018-10-19 PROCEDURE — G8482 FLU IMMUNIZE ORDER/ADMIN: HCPCS | Performed by: STUDENT IN AN ORGANIZED HEALTH CARE EDUCATION/TRAINING PROGRAM

## 2018-10-19 RX ORDER — OMEPRAZOLE 20 MG/1
20 TABLET, DELAYED RELEASE ORAL
Qty: 30 TABLET | Refills: 3 | Status: SHIPPED | OUTPATIENT
Start: 2018-10-19 | End: 2019-03-18 | Stop reason: ALTCHOICE

## 2018-10-19 RX ORDER — GABAPENTIN 100 MG/1
100 CAPSULE ORAL 3 TIMES DAILY
Qty: 90 CAPSULE | Refills: 2 | Status: SHIPPED | OUTPATIENT
Start: 2018-10-19 | End: 2019-01-04 | Stop reason: SDUPTHER

## 2018-10-19 NOTE — LETTER
[address]  [address] [facility] [address] [facility]  Phone: [phone]  Fax: [phone]    Anisha Martin MD        October 19, 2018     Patient: Allison Osman   YOB: 1980   Date of Visit: 10/19/2018       To Whom It May Concern: The above names patient has been seen by our office for 2 years. She sufferes from the following co morbidities: Morbid Obesity , neuropathy , GERD, TINO. Allison Tripathi Her current weight is Weight: 254 lb (115.2 kg)  , and BMI of Body mass index is 53.09 kg/m². . The patient has undergone the following weight loss attempts: Exercise, Slimfast, [employer]. I feel this patient would benefit from weight loss surgery because she has been unsuccessful losing weight with other diet methods, and her medical conditions will become life-threatening if she does not help getting her weight under control. I appreciate your consideration for approval. Please feel free to contact me for any further information.           Anisha Martin MD

## 2018-10-19 NOTE — PROGRESS NOTES
Surgery. Past Medical History:   Diagnosis Date    Gastric reflux     Neuropathy     Obesities, morbid ([facility] [address] 8/31/2018       Past Surgical History:   Procedure Laterality Date    TUBAL LIGATION           ALLERGIES      Allergies   Allergen Reactions    Latex        MEDICATIONS:      Current Outpatient Prescriptions on File Prior to Visit   Medication Sig Dispense Refill    hydrochlorothiazide (MICROZIDE) 12.5 MG capsule Take 1 capsule by mouth daily 30 capsule 3    Blood Pressure KIT 1 each by Does not apply route daily Diagnosis Hypertension 1 kit 0    topiramate (TOPAMAX) 50 MG tablet 1/2 tab PO BID for 3 days then 50 mg BID 60 tablet 2     No current facility-administered medications on file prior to visit. SOCIAL HISTORY    Reviewed and no change from previous record. Perri Oh  reports that she has never smoked. She has never used smokeless tobacco.    FAMILY HISTORY:    Reviewed and No change from previous visit    HEALTH MAINTENANCE DUE:      Health Maintenance Due   Topic Date Due    Cervical cancer screen  12/12/2002       REVIEW OF SYSTEMS:    12 point review of symptoms completed and found to be normal except noted in the HPI    · Constitutional: Negative for Fever, chills  · Eyes: Negative for visual changes, diplopia  · ENT: Negative for mouth sores, sore throat. +heartburn. · Cardiovascular: Negative for lightheadedness, chest pain, palpitations   · Respiratory: +SOB with exertion, No cough or wheezing. · Gastrointestinal: Negative for nausea/vomiting, change in bowel habits, abdominal pain  · Genitourinary: Negative for change in bladder habits, dysuria, hematuria.   · Musculoskeletal: Negative for joint pain  · Neurological: Negative for headache, change in muscle strength +neuropathic pain     PHYSICAL EXAM:      Vitals:    10/19/18 1506   BP: 125/86   Site: Right Upper Arm   Position: Sitting   Cuff Size: Medium Adult   Pulse: 97   Weight: 254 lb (115.2 kg)   Height: 4' 10\" ANALYSIS: No results found for: LABURIN  ASSESSMENT AND PLAN:    1. Gastroesophageal reflux disease, esophagitis presence not specified  - omeprazole (PRILOSEC OTC) 20 MG tablet; Take 1 tablet by mouth daily (with breakfast)  Dispense: 30 tablet; Refill: 3    2. Obesities, morbid [facility])  - Patient scheduled for bariatric surgery, tentatively January. - educated on diet, exercise. Seems to be compliant. 3. Neuropathy  - gabapentin (NEURONTIN) 100 MG capsule; Take 1 capsule by mouth 3 times daily for 90 days. .  Dispense: 90 capsule; Refill: 2  - continue Vit D supplementation    4. HTN  - Continue on 12.5 HCTZ daily    5. TINO  - scheduled for sleep study by bariatric surgery. Health Maintenance  Patient decline cervical screening this visit. She said that she follows with OB/Gyn and will have it done. FOLLOW UP AND INSTRUCTIONS:   Return in about 3 months (around 1/19/2019), or GERD . 1. Haley Mccann received counseling on the following healthy behaviors: nutrition and exercise    2. Reviewed prior labs and health maintenance. 3. Discussed use, benefit, and side effects of prescribed medications. Barriers to medication compliance addressed. All patient questions answered. Pt voiced understanding.      4. Patient given educational materials - see patient instructions        Junaid Lawler MD  PGY-1, Internal medicine resident  [place], [place]  10/19/2018  4:23 PM

## 2018-10-24 ENCOUNTER — HOSPITAL ENCOUNTER (OUTPATIENT)
Age: 38
Discharge: HOME | End: 2018-10-24
Payer: MEDICARE

## 2018-10-24 ENCOUNTER — HOSPITAL ENCOUNTER (OUTPATIENT)
Age: 38
Discharge: HOME | End: 2018-10-26
Payer: MEDICARE

## 2018-10-24 ENCOUNTER — HOSPITAL ENCOUNTER (OUTPATIENT)
Dept: GENERAL RADIOLOGY | Age: 38
Discharge: HOME | End: 2018-10-26
Payer: MEDICARE

## 2018-10-24 DIAGNOSIS — I10 ESSENTIAL HYPERTENSION: ICD-10-CM

## 2018-10-24 DIAGNOSIS — M79.671 FOOT PAIN, BILATERAL: ICD-10-CM

## 2018-10-24 DIAGNOSIS — M79.672 FOOT PAIN, BILATERAL: ICD-10-CM

## 2018-10-24 DIAGNOSIS — E66.01 MORBID OBESITY (HCC): ICD-10-CM

## 2018-10-24 DIAGNOSIS — M72.2 PLANTAR FASCIITIS OF RIGHT FOOT: ICD-10-CM

## 2018-10-24 LAB
ESTIMATED AVERAGE GLUCOSE: 103 MG/DL
FERRITIN: 79 UG/L (ref 13–150)
HBA1C MFR BLD: 5.2 % (ref 4–6)
IRON SATURATION: 21 % (ref 20–55)
IRON: 55 UG/DL (ref 37–145)
MAGNESIUM: 2 MG/DL (ref 1.6–2.6)
PTH INTACT: 70.79 PG/ML (ref 15–65)
THYROXINE, FREE: 1.07 NG/DL (ref 0.93–1.7)
TOTAL IRON BINDING CAPACITY: 259 UG/DL (ref 250–450)
TSH SERPL DL<=0.05 MIU/L-ACNC: 2.08 MIU/L (ref 0.3–5)
UNSATURATED IRON BINDING CAPACITY: 204 UG/DL (ref 112–347)

## 2018-10-24 PROCEDURE — 84439 ASSAY OF FREE THYROXINE: CPT

## 2018-10-24 PROCEDURE — 83735 ASSAY OF MAGNESIUM: CPT

## 2018-10-24 PROCEDURE — 83970 ASSAY OF PARATHORMONE: CPT

## 2018-10-24 PROCEDURE — 83036 HEMOGLOBIN GLYCOSYLATED A1C: CPT

## 2018-10-24 PROCEDURE — 83540 ASSAY OF IRON: CPT

## 2018-10-24 PROCEDURE — 84425 ASSAY OF VITAMIN B-1: CPT

## 2018-10-24 PROCEDURE — 73630 X-RAY EXAM OF FOOT: CPT

## 2018-10-24 PROCEDURE — 84443 ASSAY THYROID STIM HORMONE: CPT

## 2018-10-24 PROCEDURE — 83550 IRON BINDING TEST: CPT

## 2018-10-24 PROCEDURE — 82728 ASSAY OF FERRITIN: CPT

## 2018-10-24 PROCEDURE — 36415 COLL VENOUS BLD VENIPUNCTURE: CPT

## 2018-10-24 PROCEDURE — 84590 ASSAY OF VITAMIN A: CPT

## 2018-10-27 LAB
RETINYL PALMITATE: <0.02 MG/L (ref 0–0.1)
VITAMIN A LEVEL: 0.42 MG/L (ref 0.3–1.2)
VITAMIN A, INTERP: NORMAL

## 2018-10-28 LAB — VITAMIN B1 WHOLE BLOOD: 121 NMOL/L (ref 70–180)

## 2018-11-02 ENCOUNTER — TELEPHONE (OUTPATIENT)
Dept: BARIATRICS/WEIGHT MGMT | Age: 38
End: 2018-11-02

## 2018-11-02 NOTE — TELEPHONE ENCOUNTER
Spoke to Manav Segura in regards to her no show on 11/2/18. She rescheduled for 11/9/18. When making her 11/9 appointment I did a reschedule instead of just copying forward her appt information. Letter sent to her as well.

## 2018-11-07 ENCOUNTER — OFFICE VISIT (OUTPATIENT)
Dept: NEUROLOGY | Age: 38
End: 2018-11-07
Payer: MEDICARE

## 2018-11-07 VITALS
BODY MASS INDEX: 53.7 KG/M2 | WEIGHT: 255.8 LBS | HEIGHT: 58 IN | DIASTOLIC BLOOD PRESSURE: 91 MMHG | HEART RATE: 72 BPM | SYSTOLIC BLOOD PRESSURE: 130 MMHG

## 2018-11-07 DIAGNOSIS — R20.0 NUMBNESS: Primary | ICD-10-CM

## 2018-11-07 PROCEDURE — G8482 FLU IMMUNIZE ORDER/ADMIN: HCPCS | Performed by: PSYCHIATRY & NEUROLOGY

## 2018-11-07 PROCEDURE — G8427 DOCREV CUR MEDS BY ELIG CLIN: HCPCS | Performed by: PSYCHIATRY & NEUROLOGY

## 2018-11-07 PROCEDURE — G8417 CALC BMI ABV UP PARAM F/U: HCPCS | Performed by: PSYCHIATRY & NEUROLOGY

## 2018-11-07 PROCEDURE — 1036F TOBACCO NON-USER: CPT | Performed by: PSYCHIATRY & NEUROLOGY

## 2018-11-07 PROCEDURE — 99214 OFFICE O/P EST MOD 30 MIN: CPT | Performed by: PSYCHIATRY & NEUROLOGY

## 2018-11-07 NOTE — PROGRESS NOTES
[facility]  [facility], [address], [address] [facility]  Ph: [phone] or [phone]  FAX: [phone]    Master Mata M.D.  Renetta Perez M.D. Griffin West M.D. Regine Arceo M.D. Dear Dr. Isabelle Norton MD     The patient comes in today as a follow-up visit. She has a previous history of medically intractable chronic migraine headaches. On the last visit, the patient was continued on topiramate 50 mg twice a day. The patient states that headaches have improved mildly however she has not is worsening of the numbness and tingling in the hands and feet. She doesn't believe the symptoms are exacerbated by topiramate as they were going on even before the medication was started. She also reports having difficulty with walking and gait imbalance. She reports her headaches are associated with visual obscuration. Sometimes the headache may last 15 -20 minutes but sometimes it last up to the whole day. She reports having 2-3 headaches in a week. The patient has been seen by bariatric surgery and is scheduled to undergo surgery in January 2019.       Neurological work up:  MRI brain July 2016 normal    Lab Results   Component Value Date    LDLCHOLESTEROL 75 09/25/2018     No components found for: CHLPL  Lab Results   Component Value Date    TRIG 239 (H) 09/25/2018    TRIG 207 (H) 05/30/2017    TRIG 264 (H) 09/23/2015     Lab Results   Component Value Date    HDL 35 (L) 09/25/2018    HDL 47 05/30/2017    HDL 34 (L) 09/23/2015     No results found for: LDLCALC  No results found for: LABVLDL  Lab Results   Component Value Date    LABA1C 5.2 10/24/2018     Lab Results   Component Value Date     10/24/2018     Lab Results   Component Value Date    NUOERHRQ16 373 09/25/2018          General examination:    Head: Normocephalic, atraumatic  Eyes: Extraocular movements intact  Lungs: Respirations unlabored, chest wall no deformity  ENT: Normal

## 2018-11-07 NOTE — PROGRESS NOTES
Constitutional Weight: absent, Appetite: absent, Fatigue:absent   HEENT Ears: normal,  Eyes: glasses, Visual disturbance: present   Reespiratory Shortness of breath: absent, Cough: absent   Cardivascular Chest pain: absent, Leg swelling :present   GI Constipation: absent, Diarrhea: absent, Swallowing change: absent    Urinary frequency:absent, Urinary urgency: absent, Urinary incontinence: absent   Musculoskeletal Neck pain: absent, Back pain: present, Stiffness: absent, Muscle pain: absent, Joint pain: present, Restless legs: present   Dermatological Hair loss: absent, Skin changes: absent   Neurological Memory loss: absent, Confusion: absent, Seizures: absent, Trouble walking or imbalance: present, Dizziness: absent, Weakness: absent, Numbness: absent Tremor: absent, Spasm: absent, Speech difficulty: present, Headache: present, Light sensitivity: present   Psychiatric Anxiety: present, Hallucination: absent, Mood disorder: absent   Hematologic Abnormal bleeding: absent, Anemia: absent, Clotting disorder: absent, Lymph gland changes: absent

## 2018-11-08 DIAGNOSIS — R06.83 SNORING: ICD-10-CM

## 2018-11-08 DIAGNOSIS — E66.01 MORBID OBESITY (HCC): ICD-10-CM

## 2018-11-09 ENCOUNTER — TELEPHONE (OUTPATIENT)
Dept: BARIATRICS/WEIGHT MGMT | Age: 38
End: 2018-11-09

## 2018-11-09 ENCOUNTER — NURSE ONLY (OUTPATIENT)
Dept: BARIATRICS/WEIGHT MGMT | Age: 38
End: 2018-11-09

## 2018-11-09 VITALS — BODY MASS INDEX: 53.3 KG/M2 | WEIGHT: 255 LBS

## 2018-11-09 DIAGNOSIS — G47.33 OSA (OBSTRUCTIVE SLEEP APNEA): Primary | ICD-10-CM

## 2018-11-09 NOTE — PROGRESS NOTES
ready are you to change at this time? 10             Assessment:  Nutritional Needs:  Men: 1500kcal daily minimum     Women 1200kcal daily minimum. 60-80gm of protein daily  PES Statement:  Obesity related to a complex combination of decreased energy needs, disordered eating patterns, physical inactivity, and increased psychological/life stress as evidenced by BMI Body mass index is 53.3 kg/m². and inability to maintain a significant amount of weight loss through conventional weight loss interventions. Problem list:  [facility]  Medical Nutrition Therapy   Metabolic and Bariatric Surgery              [x]     Sugary non carbonated  beverages Gene-aid / half&half sweet tea occasionally       []     Dining out        []     No activity plan        []     Supportive therapy indicated         []    Diet   Carbonated beverages       [x]      Sugary carbonated beverages 2-12 cans/day pepsi or coke        []    Erratic meal times        []   Excessive sugar intake        []       alcohol        []        smoking      []  Not attending appt. On time and as scheduled      []  Caffeine intake (coffee or tea)  Initial assessment date 10-11-18    Wt 256lb       Goals    All goals were planned with and agreed on by the patient. Goal Card  Name                                                                                                                           Scott Mancia  1. I will read the entire patient educational binder provided to me prior to my second appointment at [facility]. 2. I will make my psychological evaluation appointment prior to my second appointment at [facility]. 3. I will bring this tracking tool to every appointment with a health care provider at [facility]. 4. I will eliminate all nicotine, tobacco and e-cigarettes prior to surgery. 5. I will limit alcoholic beverages prior to surgery to 1 mixed drink or glass of wine (4-6oz).       6. I will limit dining out including fast food to 3 times a week prior to surgery. 7. I will eliminate sugary beverages prior to surgery. 8. I will eliminate carbonated beverages prior to surgery. 9. I will eliminate drinking with a straw prior to surgery. 10. I will limit caffeinated beverages prior to my surgery to [address] daily. 11. I will eliminate cold cereals prepared with milk prior to surgery. 12. I will do a 5 minute reflection    13. I will food journal daily (If I dont find this helpful after one month I may discontinue the behavior with the understanding that it will be important to my health to do this for the first three months following surgery). 14. I will exercise daily for 10-30  minutes daily 24 days per month or more as tolerated. I will keep a daily log of my physical activity each day. 15. I will determine my optimal supplement plan. 16. I will purchase my supplements. 17. I will begin taking supplements according to my plan. 18. I will eat 8-11 servings of lean protein daily following the guidelines for meal planning in the patient educational binder provided to me. 19. I will eat every 3-5 hours for all meals for one day each week on a day of my choosing. 20. I will maintain my fluid intake of at least 64oz daily. 21. I will follow the 15/30/15 rule at least one day each week for all meals I am allowed to have a small 4oz beverage as needed at meal times. ( 15-30-15-do not drink 15minutes prior to a meal, take 30minutes to eat your meal and dont drink 15 minutes after your meal)    22. I will eat around my plate at all meals at least one day each week on a day of my choosing. Please write down the greatest motivator that brought you to us today  I want to manage my weight because                                appt # na oa What is your next step?   G# 1 2 3 4 5 6 7 8 9   0    1

## 2018-11-09 NOTE — TELEPHONE ENCOUNTER
Pt was in for initial dietician visit earlier today - asked question at   Called pt - left detailed voicemail to answer her question  We have her apnea link screening result - it is awaiting review from the surgeon.   I let her know that once it is reviewed - we will let her know if it is acceptable or if she needs a next step

## 2018-11-09 NOTE — TELEPHONE ENCOUNTER
Kathleen Bright is requesting a call for someone to go over her sleep study results. She forgot to ask about it at her appointment today. Please call her at [phone].      Thank you

## 2018-11-13 ENCOUNTER — OFFICE VISIT (OUTPATIENT)
Dept: OBGYN | Age: 38
End: 2018-11-13
Payer: MEDICARE

## 2018-11-13 ENCOUNTER — HOSPITAL ENCOUNTER (OUTPATIENT)
Age: 38
Setting detail: SPECIMEN
Discharge: HOME | End: 2018-11-13
Payer: MEDICARE

## 2018-11-13 VITALS
HEART RATE: 88 BPM | WEIGHT: 253.3 LBS | DIASTOLIC BLOOD PRESSURE: 78 MMHG | BODY MASS INDEX: 52.94 KG/M2 | SYSTOLIC BLOOD PRESSURE: 116 MMHG

## 2018-11-13 DIAGNOSIS — N94.6 PAINFUL MENSTRUAL PERIODS: ICD-10-CM

## 2018-11-13 DIAGNOSIS — Z01.419 WELL WOMAN EXAM WITH ROUTINE GYNECOLOGICAL EXAM: Primary | ICD-10-CM

## 2018-11-13 DIAGNOSIS — N93.9 ABNORMAL UTERINE BLEEDING: ICD-10-CM

## 2018-11-13 LAB
DIRECT EXAM: NORMAL
Lab: NORMAL
SPECIMEN DESCRIPTION: NORMAL
STATUS: NORMAL

## 2018-11-13 PROCEDURE — G0145 SCR C/V CYTO,THINLAYER,RESCR: HCPCS

## 2018-11-13 PROCEDURE — 99203 OFFICE O/P NEW LOW 30 MIN: CPT | Performed by: STUDENT IN AN ORGANIZED HEALTH CARE EDUCATION/TRAINING PROGRAM

## 2018-11-13 PROCEDURE — G8482 FLU IMMUNIZE ORDER/ADMIN: HCPCS | Performed by: STUDENT IN AN ORGANIZED HEALTH CARE EDUCATION/TRAINING PROGRAM

## 2018-11-13 PROCEDURE — 99385 PREV VISIT NEW AGE 18-39: CPT | Performed by: STUDENT IN AN ORGANIZED HEALTH CARE EDUCATION/TRAINING PROGRAM

## 2018-11-13 PROCEDURE — 87660 TRICHOMONAS VAGIN DIR PROBE: CPT

## 2018-11-13 PROCEDURE — 87480 CANDIDA DNA DIR PROBE: CPT

## 2018-11-13 PROCEDURE — 87491 CHLMYD TRACH DNA AMP PROBE: CPT

## 2018-11-13 PROCEDURE — 87624 HPV HI-RISK TYP POOLED RSLT: CPT

## 2018-11-13 PROCEDURE — 87510 GARDNER VAG DNA DIR PROBE: CPT

## 2018-11-13 PROCEDURE — 87591 N.GONORRHOEAE DNA AMP PROB: CPT

## 2018-11-13 NOTE — PROGRESS NOTES
Complete   3. Painful menstrual periods  CBC Auto Differential    TSH with Reflex    HCG, Quantitative, Pregnancy    Protime-INR    APTT    US Non OB Transvaginal    Follicle Stimulating Hormone    Luteinizing Hormone    Glucose, Fasting    Insulin, total    Hemoglobin A1C    US BREAST COMPLETE LEFT    US BREAST COMPLETE RIGHT    US Pelvis Complete            Chief Complaint   Patient presents with    Gynecologic Exam     NP          Past Medical History:   Diagnosis Date    Gastric reflux     Neuropathy     Obesities, morbid ([facility] [address] 8/31/2018         Patient Active Problem List    Diagnosis Date Noted    Vitamin D deficiency 09/26/2018    Neuropathy 08/31/2018    Obesities, morbid ([facility] [address] 08/31/2018    Nonintractable headache           Hereditary Breast, Ovarian, Colon and Uterine Cancer screening Done. Tobacco & Secondary smoke risks reviewed; instructed on cessation and avoidance      Counseling Completed:  Prevent Health Recommendations          PLAN:  Return in about 2 weeks (around 11/27/2018) for Results Follow up w/ EMB Hscope. Labs, Pelvic US and Breast US ordered. GC, vaginitis probe pending. Will treat based on results. Repeat Annual every 1 year  Cervical Cytology Evaluation begins at 24years old. If Negative Cytology, Follow-up screening per current guidelines. Mammograms every 1 year. If 35 yo and last mammogram was negative. Calcium and Vitamin D dosing reviewed. Colonoscopy screening reviewed as well as onset for bone density testing. Birth control and barrier recommendations discussed. STD counseling and prevention reviewed. Gardisil counseling completed for all patients 10-35 yo. Routine health maintenance per patients PCP. Orders Placed This Encounter   Procedures    VAGINITIS DNA PROBE    C.trachomatis N.gonorrhoeae DNA    US Non OB Transvaginal     Begin with transabdominal imaging.      Standing Status:   Future     Standing Expiration Date:   11/13/2019     Order  Follicle Stimulating Hormone     Standing Status:   Future     Standing Expiration Date:   11/13/2019    Luteinizing Hormone     Standing Status:   Future     Standing Expiration Date:   11/13/2019    Glucose, Fasting     Standing Status:   Future     Standing Expiration Date:   11/13/2019    Insulin, total     Patient must be fasting for 12-14 hrs     Standing Status:   Future     Standing Expiration Date:   12/13/2018    Hemoglobin A1C     Standing Status:   Future     Standing Expiration Date:   11/13/2019

## 2018-11-13 NOTE — PROGRESS NOTES
Attending Physician Statement  I have discussed the care of Minoo Mata, including pertinent history and exam findings,  with the resident. I have reviewed the key elements of all parts of the encounter with the resident. I agree with the assessment, plan and orders as documented by the resident.   (GE Modifier)      Illa Pump DO

## 2018-11-14 LAB
C TRACH DNA GENITAL QL NAA+PROBE: NEGATIVE
HPV SAMPLE: NORMAL
HPV SOURCE: NORMAL
HPV, GENOTYPE 16: NOT DETECTED
HPV, GENOTYPE 18: NOT DETECTED
HPV, HIGH RISK OTHER: NOT DETECTED
HPV, INTERPRETATION: NORMAL
N. GONORRHOEAE DNA: NEGATIVE

## 2018-11-26 RX ORDER — IBUPROFEN 800 MG/1
800 TABLET ORAL EVERY 8 HOURS PRN
Qty: 60 TABLET | Refills: 0 | Status: ON HOLD | OUTPATIENT
Start: 2018-11-26 | End: 2019-04-03 | Stop reason: HOSPADM

## 2018-11-28 ENCOUNTER — HOSPITAL ENCOUNTER (OUTPATIENT)
Dept: ULTRASOUND IMAGING | Age: 38
Discharge: HOME | End: 2018-11-30
Payer: MEDICARE

## 2018-11-28 ENCOUNTER — HOSPITAL ENCOUNTER (OUTPATIENT)
Dept: MRI IMAGING | Age: 38
Discharge: HOME | End: 2018-11-30
Payer: MEDICARE

## 2018-11-28 DIAGNOSIS — N93.9 ABNORMAL UTERINE BLEEDING: ICD-10-CM

## 2018-11-28 DIAGNOSIS — R20.0 NUMBNESS: ICD-10-CM

## 2018-11-28 DIAGNOSIS — N94.6 PAINFUL MENSTRUAL PERIODS: ICD-10-CM

## 2018-11-28 LAB
GFR NON-AFRICAN AMERICAN: >60 ML/MIN
GFR SERPL CREATININE-BSD FRML MDRD: >60 ML/MIN
GFR SERPL CREATININE-BSD FRML MDRD: NORMAL ML/MIN/{1.73_M2}
POC CREATININE: 0.82 MG/DL (ref 0.51–1.19)

## 2018-11-28 PROCEDURE — A9576 INJ PROHANCE MULTIPACK: HCPCS | Performed by: PSYCHIATRY & NEUROLOGY

## 2018-11-28 PROCEDURE — 70553 MRI BRAIN STEM W/O & W/DYE: CPT

## 2018-11-28 PROCEDURE — 82565 ASSAY OF CREATININE: CPT

## 2018-11-28 PROCEDURE — 76856 US EXAM PELVIC COMPLETE: CPT

## 2018-11-28 PROCEDURE — 6360000004 HC RX CONTRAST MEDICATION: Performed by: PSYCHIATRY & NEUROLOGY

## 2018-11-28 RX ADMIN — GADOTERIDOL 20 ML: 279.3 INJECTION, SOLUTION INTRAVENOUS at 12:00

## 2018-11-30 LAB — CYTOLOGY REPORT: NORMAL

## 2018-12-03 ENCOUNTER — NURSE ONLY (OUTPATIENT)
Dept: BARIATRICS/WEIGHT MGMT | Age: 38
End: 2018-12-03

## 2018-12-03 NOTE — PROGRESS NOTES
Patient attends Introduction to Bariatric Surgery class. [facility] services explained. Support Group schedule reviewed and attendance encouraged. Teaching done about bariatric multivitamins, calcium and protein supplements. [facility] product order form reviewed. Encouraged patient to formulate a medication plan prior to surgery. Diabetic guidelines offered to the entire group and asked that people abide by them if applicable. Offered registation to the fitness facility. Allowed patients the opportunity to ask questions.

## 2018-12-07 ENCOUNTER — ANESTHESIA (OUTPATIENT)
Dept: ENDOSCOPY | Age: 38
End: 2018-12-07
Payer: MEDICARE

## 2018-12-07 ENCOUNTER — ANESTHESIA EVENT (OUTPATIENT)
Dept: ENDOSCOPY | Age: 38
End: 2018-12-07
Payer: MEDICARE

## 2018-12-07 ENCOUNTER — HOSPITAL ENCOUNTER (OUTPATIENT)
Age: 38
Setting detail: OUTPATIENT SURGERY
Discharge: HOME | End: 2018-12-07
Attending: SURGERY | Admitting: SURGERY
Payer: MEDICARE

## 2018-12-07 VITALS
BODY MASS INDEX: 51.41 KG/M2 | DIASTOLIC BLOOD PRESSURE: 87 MMHG | HEIGHT: 59 IN | TEMPERATURE: 97.8 F | WEIGHT: 255 LBS | SYSTOLIC BLOOD PRESSURE: 127 MMHG | RESPIRATION RATE: 27 BRPM | HEART RATE: 78 BPM | OXYGEN SATURATION: 100 %

## 2018-12-07 VITALS
SYSTOLIC BLOOD PRESSURE: 111 MMHG | DIASTOLIC BLOOD PRESSURE: 71 MMHG | OXYGEN SATURATION: 95 % | RESPIRATION RATE: 22 BRPM

## 2018-12-07 LAB — HCG, PREGNANCY URINE (POC): NEGATIVE

## 2018-12-07 PROCEDURE — 88342 IMHCHEM/IMCYTCHM 1ST ANTB: CPT

## 2018-12-07 PROCEDURE — 2580000003 HC RX 258: Performed by: SURGERY

## 2018-12-07 PROCEDURE — 43239 EGD BIOPSY SINGLE/MULTIPLE: CPT | Performed by: SURGERY

## 2018-12-07 PROCEDURE — 84703 CHORIONIC GONADOTROPIN ASSAY: CPT

## 2018-12-07 PROCEDURE — 2580000003 HC RX 258: Performed by: NURSE ANESTHETIST, CERTIFIED REGISTERED

## 2018-12-07 PROCEDURE — 3609017100 HC EGD: Performed by: SURGERY

## 2018-12-07 PROCEDURE — 2500000003 HC RX 250 WO HCPCS: Performed by: NURSE ANESTHETIST, CERTIFIED REGISTERED

## 2018-12-07 PROCEDURE — 88305 TISSUE EXAM BY PATHOLOGIST: CPT

## 2018-12-07 PROCEDURE — 7100000011 HC PHASE II RECOVERY - ADDTL 15 MIN: Performed by: SURGERY

## 2018-12-07 PROCEDURE — 3700000000 HC ANESTHESIA ATTENDED CARE: Performed by: SURGERY

## 2018-12-07 PROCEDURE — 6360000002 HC RX W HCPCS: Performed by: NURSE ANESTHETIST, CERTIFIED REGISTERED

## 2018-12-07 PROCEDURE — 2709999900 HC NON-CHARGEABLE SUPPLY: Performed by: SURGERY

## 2018-12-07 PROCEDURE — 7100000010 HC PHASE II RECOVERY - FIRST 15 MIN: Performed by: SURGERY

## 2018-12-07 PROCEDURE — 87077 CULTURE AEROBIC IDENTIFY: CPT

## 2018-12-07 RX ORDER — FENTANYL CITRATE 50 UG/ML
25 INJECTION, SOLUTION INTRAMUSCULAR; INTRAVENOUS ONCE
Status: CANCELLED | OUTPATIENT
Start: 2018-12-07 | End: 2018-12-07

## 2018-12-07 RX ORDER — SODIUM CHLORIDE 0.9 % (FLUSH) 0.9 %
10 SYRINGE (ML) INJECTION PRN
Status: CANCELLED | OUTPATIENT
Start: 2018-12-07

## 2018-12-07 RX ORDER — FENTANYL CITRATE 50 UG/ML
25 INJECTION, SOLUTION INTRAMUSCULAR; INTRAVENOUS EVERY 5 MIN PRN
Status: DISCONTINUED | OUTPATIENT
Start: 2018-12-07 | End: 2018-12-07 | Stop reason: HOSPADM

## 2018-12-07 RX ORDER — SODIUM CHLORIDE 9 MG/ML
INJECTION, SOLUTION INTRAVENOUS ONCE
Status: COMPLETED | OUTPATIENT
Start: 2018-12-07 | End: 2018-12-07

## 2018-12-07 RX ORDER — SODIUM CHLORIDE 9 MG/ML
INJECTION, SOLUTION INTRAVENOUS CONTINUOUS PRN
Status: DISCONTINUED | OUTPATIENT
Start: 2018-12-07 | End: 2018-12-07 | Stop reason: SDUPTHER

## 2018-12-07 RX ORDER — GLYCOPYRROLATE 1 MG/5 ML
SYRINGE (ML) INTRAVENOUS PRN
Status: DISCONTINUED | OUTPATIENT
Start: 2018-12-07 | End: 2018-12-07 | Stop reason: SDUPTHER

## 2018-12-07 RX ORDER — MIDAZOLAM HYDROCHLORIDE 1 MG/ML
2 INJECTION INTRAMUSCULAR; INTRAVENOUS
Status: CANCELLED | OUTPATIENT
Start: 2018-12-07 | End: 2018-12-07

## 2018-12-07 RX ORDER — SODIUM CHLORIDE 0.9 % (FLUSH) 0.9 %
10 SYRINGE (ML) INJECTION EVERY 12 HOURS SCHEDULED
Status: CANCELLED | OUTPATIENT
Start: 2018-12-07

## 2018-12-07 RX ORDER — ONDANSETRON 2 MG/ML
4 INJECTION INTRAMUSCULAR; INTRAVENOUS ONCE
Status: CANCELLED | OUTPATIENT
Start: 2018-12-07 | End: 2018-12-07

## 2018-12-07 RX ORDER — PROPOFOL 10 MG/ML
INJECTION, EMULSION INTRAVENOUS PRN
Status: DISCONTINUED | OUTPATIENT
Start: 2018-12-07 | End: 2018-12-07 | Stop reason: SDUPTHER

## 2018-12-07 RX ADMIN — SODIUM CHLORIDE: 9 INJECTION, SOLUTION INTRAVENOUS at 07:24

## 2018-12-07 RX ADMIN — SODIUM CHLORIDE: 9 INJECTION, SOLUTION INTRAVENOUS at 07:12

## 2018-12-07 RX ADMIN — Medication 0.2 MG: at 07:30

## 2018-12-07 RX ADMIN — PROPOFOL 250 MG: 10 INJECTION, EMULSION INTRAVENOUS at 07:41

## 2018-12-07 ASSESSMENT — PAIN SCALES - GENERAL
PAINLEVEL_OUTOF10: 0

## 2018-12-07 NOTE — H&P
Subjective     The patient is a 40 y.o. female who is here to undergo EGD for GERD and indigestion with meals. She is considering gastric bypass. Past Medical History:   Diagnosis Date    Gastric reflux     Neuropathy     Obesities, morbid ([address] 8/31/2018   . Review of Systems - No chest pain    Allergies: Allergies   Allergen Reactions    Latex        Past Surgical History:  Past Surgical History:   Procedure Laterality Date    TUBAL LIGATION         Family History:  Family History   Problem Relation Age of Onset    Thyroid Disease Mother     Diabetes Mother     Heart Disease Mother     High Blood Pressure Mother     Migraines Mother     Diabetes Father     Heart Disease Father     Thyroid Disease Father     Liver Disease Father     High Blood Pressure Father     Cancer Father         liver    Thyroid Disease Sister     Neuropathy Sister     High Blood Pressure Sister     Kidney Disease Paternal Aunt     Heart Disease Paternal Uncle     Migraines Maternal Grandmother     Stroke Maternal Grandfather     Cancer Maternal Grandfather         breast    Other Paternal Grandmother         epilepsy    Diabetes Paternal Grandmother     High Blood Pressure Paternal Grandmother     Diabetes Paternal Grandfather     High Blood Pressure Paternal Grandfather     Heart Disease Paternal Grandfather        Social History:  Social History     Social History    Marital status:      Spouse name: N/A    Number of children: N/A    Years of education: N/A     Occupational History    Not on file.      Social History Main Topics    Smoking status: Never Smoker    Smokeless tobacco: Never Used    Alcohol use No    Drug use: No    Sexual activity: No     Other Topics Concern    Not on file     Social History Narrative    No narrative on file       Current Facility-Administered Medications   Medication Dose Route Frequency Provider Last Rate Last Dose    fentaNYL (SUBLIMAZE) injection

## 2018-12-07 NOTE — ANESTHESIA PRE PROCEDURE
Department of Anesthesiology  Preprocedure Note       Name:  Mandy Matthews   Age:  40 y.o.  :  1980                                          MRN:  [id number]         Date:  2018      Surgeon: Polina Randall):  Darrick Hanna DO    Procedure: EGD ESOPHAGOGASTRODUODENOSCOPY (N/A )    Medications prior to admission:   Prior to Admission medications    Medication Sig Start Date End Date Taking? Authorizing Provider   ibuprofen (ADVIL;MOTRIN) 800 MG tablet Take 1 tablet by mouth every 8 hours as needed for Pain 18 Yes Antonio Kahn DPM   omeprazole (PRILOSEC OTC) 20 MG tablet Take 1 tablet by mouth daily (with breakfast) 10/19/18  Yes Jason Willams MD   gabapentin (NEURONTIN) 100 MG capsule Take 1 capsule by mouth 3 times daily for 90 days. . 10/19/18 1/17/19 Yes Jason Willams MD   hydrochlorothiazide (MICROZIDE) 12.5 MG capsule Take 1 capsule by mouth daily 18  Yes Charlotte Irby MD   topiramate (TOPAMAX) 50 MG tablet 1/2 tab PO BID for 3 days then 50 mg BID 18  Yes Moriah Andersen MD   Blood Pressure KIT 1 each by Does not apply route daily Diagnosis Hypertension 18   Charlotte Irby MD       Current medications:    Current Facility-Administered Medications   Medication Dose Route Frequency Provider Last Rate Last Dose    0.9 % sodium chloride infusion   Intravenous Once Darrick Hanna DO           Allergies:     Allergies   Allergen Reactions    Latex        Problem List:    Patient Active Problem List   Diagnosis Code    Nonintractable headache R51    Neuropathy G62.9    Obesities, morbid ([facility] [address] E66.01    Vitamin D deficiency E55.9       Past Medical History:        Diagnosis Date    Gastric reflux     Neuropathy     Obesities, morbid ([facility] [address] 2018       Past Surgical History:        Procedure Laterality Date    TUBAL LIGATION         Social History:    Social History   Substance Use Topics    Smoking status: Never Smoker    Smokeless tobacco: Never

## 2018-12-08 LAB
DIRECT EXAM: NEGATIVE
Lab: NORMAL
SPECIMEN DESCRIPTION: NORMAL
STATUS: NORMAL

## 2018-12-10 ENCOUNTER — TELEPHONE (OUTPATIENT)
Dept: BARIATRICS/WEIGHT MGMT | Age: 38
End: 2018-12-10

## 2018-12-10 RX ORDER — PANTOPRAZOLE SODIUM 40 MG/1
40 TABLET, DELAYED RELEASE ORAL DAILY
Qty: 60 TABLET | Refills: 3 | Status: SHIPPED | OUTPATIENT
Start: 2018-12-10 | End: 2019-01-04 | Stop reason: SDUPTHER

## 2018-12-11 LAB — SURGICAL PATHOLOGY REPORT: NORMAL

## 2018-12-12 ENCOUNTER — NURSE ONLY (OUTPATIENT)
Dept: BARIATRICS/WEIGHT MGMT | Age: 38
End: 2018-12-12

## 2018-12-13 ENCOUNTER — TELEPHONE (OUTPATIENT)
Dept: ADMINISTRATIVE | Age: 38
End: 2018-12-13

## 2018-12-15 ENCOUNTER — HOSPITAL ENCOUNTER (OUTPATIENT)
Dept: SLEEP CENTER | Age: 38
Discharge: HOME | End: 2018-12-17
Payer: MEDICARE

## 2018-12-15 VITALS — HEART RATE: 71 BPM | BODY MASS INDEX: 51.61 KG/M2 | HEIGHT: 59 IN | RESPIRATION RATE: 18 BRPM | WEIGHT: 256 LBS

## 2018-12-15 DIAGNOSIS — G47.33 OSA (OBSTRUCTIVE SLEEP APNEA): ICD-10-CM

## 2018-12-15 PROCEDURE — 95810 POLYSOM 6/> YRS 4/> PARAM: CPT

## 2018-12-16 ASSESSMENT — SLEEP AND FATIGUE QUESTIONNAIRES
HOW LIKELY ARE YOU TO NOD OFF OR FALL ASLEEP WHILE SITTING AND READING: 3
HOW LIKELY ARE YOU TO NOD OFF OR FALL ASLEEP WHEN YOU ARE A PASSENGER IN A CAR FOR AN HOUR WITHOUT A BREAK: 3
HOW LIKELY ARE YOU TO NOD OFF OR FALL ASLEEP WHILE SITTING QUIETLY AFTER LUNCH WITHOUT ALCOHOL: 1
HOW LIKELY ARE YOU TO NOD OFF OR FALL ASLEEP WHILE WATCHING TV: 3
HOW LIKELY ARE YOU TO NOD OFF OR FALL ASLEEP WHILE SITTING AND TALKING TO SOMEONE: 1
ESS TOTAL SCORE: 17
HOW LIKELY ARE YOU TO NOD OFF OR FALL ASLEEP IN A CAR, WHILE STOPPED FOR A FEW MINUTES IN TRAFFIC: 1
HOW LIKELY ARE YOU TO NOD OFF OR FALL ASLEEP WHILE SITTING INACTIVE IN A PUBLIC PLACE: 2
HOW LIKELY ARE YOU TO NOD OFF OR FALL ASLEEP WHILE LYING DOWN TO REST IN THE AFTERNOON WHEN CIRCUMSTANCES PERMIT: 3

## 2018-12-17 ENCOUNTER — OFFICE VISIT (OUTPATIENT)
Dept: BARIATRICS/WEIGHT MGMT | Age: 38
End: 2018-12-17
Payer: MEDICARE

## 2018-12-17 VITALS
SYSTOLIC BLOOD PRESSURE: 122 MMHG | HEIGHT: 59 IN | DIASTOLIC BLOOD PRESSURE: 74 MMHG | WEIGHT: 257 LBS | BODY MASS INDEX: 51.81 KG/M2 | HEART RATE: 78 BPM | RESPIRATION RATE: 22 BRPM

## 2018-12-17 DIAGNOSIS — G62.9 NEUROPATHY: ICD-10-CM

## 2018-12-17 DIAGNOSIS — E55.9 VITAMIN D DEFICIENCY: ICD-10-CM

## 2018-12-17 DIAGNOSIS — K29.50 ANTRAL GASTRITIS: ICD-10-CM

## 2018-12-17 DIAGNOSIS — E66.01 MORBID OBESITY WITH BMI OF 50.0-59.9, ADULT (HCC): ICD-10-CM

## 2018-12-17 DIAGNOSIS — G47.33 OSA (OBSTRUCTIVE SLEEP APNEA): ICD-10-CM

## 2018-12-17 PROCEDURE — G8427 DOCREV CUR MEDS BY ELIG CLIN: HCPCS | Performed by: NURSE PRACTITIONER

## 2018-12-17 PROCEDURE — G8482 FLU IMMUNIZE ORDER/ADMIN: HCPCS | Performed by: NURSE PRACTITIONER

## 2018-12-17 PROCEDURE — G8417 CALC BMI ABV UP PARAM F/U: HCPCS | Performed by: NURSE PRACTITIONER

## 2018-12-17 PROCEDURE — 99213 OFFICE O/P EST LOW 20 MIN: CPT | Performed by: NURSE PRACTITIONER

## 2018-12-17 PROCEDURE — 1036F TOBACCO NON-USER: CPT | Performed by: NURSE PRACTITIONER

## 2018-12-17 RX ORDER — ERGOCALCIFEROL 1.25 MG/1
50000 CAPSULE ORAL WEEKLY
Qty: 12 CAPSULE | Refills: 0 | Status: ON HOLD | OUTPATIENT
Start: 2018-12-17 | End: 2019-04-03 | Stop reason: HOSPADM

## 2018-12-17 NOTE — PROGRESS NOTES
Negative for hearing loss and trouble swallowing. Eyes: Negative for photophobia and visual disturbance. Respiratory: Negative for cough, shortness of breath and wheezing. Cardiovascular: Negative for chest pain and palpitations. Gastrointestinal: Negative for nausea, vomiting, abdominal pain, diarrhea, constipation, blood in stool and abdominal distention. Endocrine: Negative for polydipsia, polyphagia and polyuria. Genitourinary: Negative for dysuria, frequency, hematuria and difficulty urinating. Musculoskeletal: Negative for myalgias, joint swelling. Skin: Negative for pallor and rash. Neurological: Negative for dizziness, tremors, light-headedness and headaches. Psychiatric/Behavioral: Negative for sleep disturbance and dysphoric mood. Objective:      Physical Exam   Vital signs reviewed. General: Well-developed and well-nourished. No acute distress. Skin: Warm, dry and intact. HEENT: Normocephalic. EOMs intact. Conjunctivae normal. Neck supple. Cardiovascular: Normal rate, regular rhythm. No murmur. Pulmonary/Chest: Normal effort. Lungs clear to auscultation. No rales, rhonchi or wheezing. Abdominal: Positive bowel sounds. Soft, nontender. Nondistended. Musculoskeletal: Movement x4. No edema. Neurological: Gait normal. Alert and oriented to person, place, and time. Psychiatric: Normal mood and affect. Speech and behavior normal. Judgment and thought content normal. Cognition and memory intact. Assessment:       Diagnosis Orders   1. Chronic migraine     2. TINO (obstructive sleep apnea)     3. Antral gastritis     4. Vitamin D deficiency  vitamin D (ERGOCALCIFEROL) 10844 units CAPS capsule   5. Neuropathy     6. Morbid obesity with BMI of 50.0-59.9, adult [facility])         Plan:    Dietitian visit today. Patient was encouraged to journal all food intake. Keep calorie level at approximately 6824-8051. Protein intake is to be a minimum of 60-80 grams per day.  Water drinking

## 2018-12-19 ENCOUNTER — OFFICE VISIT (OUTPATIENT)
Dept: NEUROLOGY | Age: 38
End: 2018-12-19
Payer: MEDICARE

## 2018-12-19 DIAGNOSIS — R20.0 NUMBNESS: Primary | ICD-10-CM

## 2018-12-19 PROCEDURE — 95909 NRV CNDJ TST 5-6 STUDIES: CPT | Performed by: PSYCHIATRY & NEUROLOGY

## 2018-12-19 PROCEDURE — 95886 MUSC TEST DONE W/N TEST COMP: CPT | Performed by: PSYCHIATRY & NEUROLOGY

## 2018-12-19 NOTE — PROGRESS NOTES
[facility] [address] [facility], [address] [place] [address]    [phone] phone  [phone] fax          Name: Alberto Shields Patient ID: [id number]   Gender: Female Date of Exam: 12/19/2018   Age: 41 y YOB: 1980   Height: 4'10\" Weight: 46 Lbs   Referring Physician: Norman Chew M.D. Examining Physician: Norman Chew MD        Patient History:   neuropathy lower extremities        Motor Nerve Conduction:    Nerve and Site Latency Amplitude Segment Latency  Difference Distance Conduction  Velocity            Peroneal.R         Ankle 4.0 ms 5.9 mV Extensor digitorum brevis-Ankle 4.0 ms 80 mm  m/s   Fibula (head) 9.7 ms 4.9 mV Ankle-Fibula (head) 5.7 ms 280 mm 49 m/s   Tibial.R         Ankle 4.2 ms 12.8 mV Abductor hallucis-Ankle 4.2 ms 80 mm  m/s   Popliteal fossa 10.8 ms 11.4 mV Ankle-Popliteal fossa 6.6 ms 330 mm 50 m/s   Peroneal.L         Ankle 4.2 ms 6.3 mV Extensor digitorum brevis-Ankle 4.2 ms 80 mm  m/s   Fibula (head) 9.8 ms 5.8 mV Ankle-Fibula (head) 5.6 ms 310 mm 55 m/s   Tibial.L         Ankle 4.1 ms 12.5 mV Abductor hallucis-Ankle 4.1 ms 80 mm  m/s   Popliteal fossa 10.6 ms 10.4 mV Ankle-Popliteal fossa 6.5 ms 370 mm 57 m/s       Sensory Nerve Conduction:    Nerve and Site Onset Latency Peak  Latency Amplitude Segment Latency  Difference Distance Conduction  Velocity             Peroneal.R          Lower leg  ms  ms  mV Ankle-Lower leg  ms 140 mm  m/s   Peroneal.L          Ankle 2.7 ms 3.9 ms 4 mV Dorsum of foot-Ankle 2.7 ms 120 mm 44 m/s       EMG Summary Table     Spontaneous MUAP Recruitment    IA Fib PSW Fasc H.F. Amp Dur. PPP Pattern   L. TIBIALIS ANTERIOR N None None None None N N N N   L. GASTROC. MEDIALIS N None None None None N N N N   L. VASTUS MEDIALIS N None None None None N N N N   L. GLUTEUS MEDIUS N None None None None N N N N   L. ILIOPSOAS N None None None None N N N N   EMG Summary Table     Spontaneous MUAP Recruitment    IA Fib

## 2019-01-02 ENCOUNTER — HOSPITAL ENCOUNTER (EMERGENCY)
Age: 39
Discharge: HOME | End: 2019-01-02
Attending: EMERGENCY MEDICINE
Payer: MEDICARE

## 2019-01-02 VITALS
SYSTOLIC BLOOD PRESSURE: 139 MMHG | TEMPERATURE: 98.2 F | RESPIRATION RATE: 16 BRPM | OXYGEN SATURATION: 100 % | DIASTOLIC BLOOD PRESSURE: 80 MMHG | HEART RATE: 80 BPM

## 2019-01-02 DIAGNOSIS — H60.502 ACUTE OTITIS EXTERNA OF LEFT EAR, UNSPECIFIED TYPE: Primary | ICD-10-CM

## 2019-01-02 PROCEDURE — 99282 EMERGENCY DEPT VISIT SF MDM: CPT

## 2019-01-02 RX ORDER — CIPROFLOXACIN AND DEXAMETHASONE 3; 1 MG/ML; MG/ML
4 SUSPENSION/ DROPS AURICULAR (OTIC) 2 TIMES DAILY
Qty: 1 BOTTLE | Refills: 0 | Status: SHIPPED | OUTPATIENT
Start: 2019-01-02 | End: 2019-01-09

## 2019-01-02 ASSESSMENT — PAIN DESCRIPTION - ORIENTATION: ORIENTATION: LEFT

## 2019-01-02 ASSESSMENT — PAIN DESCRIPTION - LOCATION: LOCATION: EAR

## 2019-01-02 ASSESSMENT — PAIN SCALES - GENERAL: PAINLEVEL_OUTOF10: 7

## 2019-01-02 ASSESSMENT — PAIN DESCRIPTION - PAIN TYPE: TYPE: ACUTE PAIN

## 2019-01-03 ASSESSMENT — ENCOUNTER SYMPTOMS
CONSTIPATION: 0
SHORTNESS OF BREATH: 0
DIARRHEA: 0
ABDOMINAL PAIN: 0
COUGH: 0
VOMITING: 0
WHEEZING: 0
NAUSEA: 0

## 2019-01-04 ENCOUNTER — OFFICE VISIT (OUTPATIENT)
Dept: INTERNAL MEDICINE | Age: 39
End: 2019-01-04
Payer: MEDICARE

## 2019-01-04 VITALS
DIASTOLIC BLOOD PRESSURE: 90 MMHG | WEIGHT: 266 LBS | SYSTOLIC BLOOD PRESSURE: 135 MMHG | BODY MASS INDEX: 54.64 KG/M2 | HEART RATE: 85 BPM

## 2019-01-04 DIAGNOSIS — K29.50 ANTRAL GASTRITIS: Primary | ICD-10-CM

## 2019-01-04 DIAGNOSIS — M54.50 CHRONIC BILATERAL LOW BACK PAIN WITHOUT SCIATICA: ICD-10-CM

## 2019-01-04 DIAGNOSIS — I10 ESSENTIAL HYPERTENSION: ICD-10-CM

## 2019-01-04 DIAGNOSIS — G89.29 CHRONIC BILATERAL LOW BACK PAIN WITHOUT SCIATICA: ICD-10-CM

## 2019-01-04 DIAGNOSIS — G47.33 OSA (OBSTRUCTIVE SLEEP APNEA): ICD-10-CM

## 2019-01-04 DIAGNOSIS — G62.9 NEUROPATHY: ICD-10-CM

## 2019-01-04 DIAGNOSIS — E66.01 MORBID OBESITY WITH BMI OF 50.0-59.9, ADULT (HCC): ICD-10-CM

## 2019-01-04 PROCEDURE — G8417 CALC BMI ABV UP PARAM F/U: HCPCS | Performed by: STUDENT IN AN ORGANIZED HEALTH CARE EDUCATION/TRAINING PROGRAM

## 2019-01-04 PROCEDURE — 99211 OFF/OP EST MAY X REQ PHY/QHP: CPT | Performed by: INTERNAL MEDICINE

## 2019-01-04 PROCEDURE — 99213 OFFICE O/P EST LOW 20 MIN: CPT | Performed by: STUDENT IN AN ORGANIZED HEALTH CARE EDUCATION/TRAINING PROGRAM

## 2019-01-04 PROCEDURE — G8427 DOCREV CUR MEDS BY ELIG CLIN: HCPCS | Performed by: STUDENT IN AN ORGANIZED HEALTH CARE EDUCATION/TRAINING PROGRAM

## 2019-01-04 PROCEDURE — G8482 FLU IMMUNIZE ORDER/ADMIN: HCPCS | Performed by: STUDENT IN AN ORGANIZED HEALTH CARE EDUCATION/TRAINING PROGRAM

## 2019-01-04 PROCEDURE — 1036F TOBACCO NON-USER: CPT | Performed by: STUDENT IN AN ORGANIZED HEALTH CARE EDUCATION/TRAINING PROGRAM

## 2019-01-04 RX ORDER — CYCLOBENZAPRINE HCL 5 MG
5 TABLET ORAL NIGHTLY PRN
Qty: 20 TABLET | Refills: 0 | Status: SHIPPED | OUTPATIENT
Start: 2019-01-04 | End: 2019-01-14

## 2019-01-04 RX ORDER — HYDROCHLOROTHIAZIDE 12.5 MG/1
12.5 CAPSULE, GELATIN COATED ORAL DAILY
Qty: 30 CAPSULE | Refills: 3 | Status: ON HOLD | OUTPATIENT
Start: 2019-01-04 | End: 2019-04-03 | Stop reason: HOSPADM

## 2019-01-04 RX ORDER — TOPIRAMATE 50 MG/1
TABLET, FILM COATED ORAL
Qty: 60 TABLET | Refills: 2 | Status: SHIPPED | OUTPATIENT
Start: 2019-01-04 | End: 2019-03-18 | Stop reason: DRUGHIGH

## 2019-01-04 RX ORDER — PANTOPRAZOLE SODIUM 40 MG/1
40 TABLET, DELAYED RELEASE ORAL DAILY
Qty: 60 TABLET | Refills: 3 | Status: SHIPPED | OUTPATIENT
Start: 2019-01-04 | End: 2019-07-15 | Stop reason: SDUPTHER

## 2019-01-04 RX ORDER — GABAPENTIN 100 MG/1
100 CAPSULE ORAL 3 TIMES DAILY
Qty: 90 CAPSULE | Refills: 2 | Status: SHIPPED | OUTPATIENT
Start: 2019-01-04 | End: 2019-04-08 | Stop reason: SDUPTHER

## 2019-01-08 ENCOUNTER — TELEPHONE (OUTPATIENT)
Dept: NEUROLOGY | Age: 39
End: 2019-01-08

## 2019-01-11 ENCOUNTER — OFFICE VISIT (OUTPATIENT)
Dept: BARIATRICS/WEIGHT MGMT | Age: 39
End: 2019-01-11
Payer: MEDICARE

## 2019-01-11 VITALS
SYSTOLIC BLOOD PRESSURE: 128 MMHG | DIASTOLIC BLOOD PRESSURE: 76 MMHG | RESPIRATION RATE: 22 BRPM | HEIGHT: 59 IN | BODY MASS INDEX: 52.41 KG/M2 | WEIGHT: 260 LBS | HEART RATE: 72 BPM

## 2019-01-11 DIAGNOSIS — G47.33 OSA (OBSTRUCTIVE SLEEP APNEA): Primary | ICD-10-CM

## 2019-01-11 DIAGNOSIS — E66.01 MORBID OBESITY WITH BMI OF 50.0-59.9, ADULT (HCC): ICD-10-CM

## 2019-01-11 DIAGNOSIS — G62.9 NEUROPATHY: ICD-10-CM

## 2019-01-11 PROCEDURE — G8482 FLU IMMUNIZE ORDER/ADMIN: HCPCS | Performed by: NURSE PRACTITIONER

## 2019-01-11 PROCEDURE — 1036F TOBACCO NON-USER: CPT | Performed by: NURSE PRACTITIONER

## 2019-01-11 PROCEDURE — 99213 OFFICE O/P EST LOW 20 MIN: CPT | Performed by: NURSE PRACTITIONER

## 2019-01-11 PROCEDURE — G8427 DOCREV CUR MEDS BY ELIG CLIN: HCPCS | Performed by: NURSE PRACTITIONER

## 2019-01-11 PROCEDURE — G8417 CALC BMI ABV UP PARAM F/U: HCPCS | Performed by: NURSE PRACTITIONER

## 2019-01-12 ENCOUNTER — HOSPITAL ENCOUNTER (OUTPATIENT)
Dept: SLEEP CENTER | Age: 39
Discharge: HOME | End: 2019-01-14
Payer: MEDICARE

## 2019-01-12 VITALS — HEIGHT: 59 IN | WEIGHT: 256 LBS | BODY MASS INDEX: 51.61 KG/M2

## 2019-01-12 DIAGNOSIS — G47.33 OSA (OBSTRUCTIVE SLEEP APNEA): ICD-10-CM

## 2019-01-12 PROCEDURE — 95811 POLYSOM 6/>YRS CPAP 4/> PARM: CPT

## 2019-01-12 PROCEDURE — 95811 POLYSOM 6/>YRS CPAP 4/> PARM: CPT | Performed by: PSYCHIATRY & NEUROLOGY

## 2019-01-14 RX ORDER — IBUPROFEN 800 MG/1
TABLET ORAL
Qty: 30 TABLET | Refills: 0 | OUTPATIENT
Start: 2019-01-14

## 2019-01-22 DIAGNOSIS — G47.33 OSA (OBSTRUCTIVE SLEEP APNEA): Primary | ICD-10-CM

## 2019-01-22 LAB — STATUS: NORMAL

## 2019-01-23 LAB — STATUS: NORMAL

## 2019-01-25 ENCOUNTER — NURSE ONLY (OUTPATIENT)
Dept: BARIATRICS/WEIGHT MGMT | Age: 39
End: 2019-01-25

## 2019-02-01 ENCOUNTER — OFFICE VISIT (OUTPATIENT)
Dept: BARIATRICS/WEIGHT MGMT | Age: 39
End: 2019-02-01
Payer: MEDICARE

## 2019-02-01 VITALS
BODY MASS INDEX: 51.61 KG/M2 | HEART RATE: 68 BPM | DIASTOLIC BLOOD PRESSURE: 74 MMHG | HEIGHT: 59 IN | RESPIRATION RATE: 20 BRPM | WEIGHT: 256 LBS | SYSTOLIC BLOOD PRESSURE: 112 MMHG

## 2019-02-01 DIAGNOSIS — K29.50 ANTRAL GASTRITIS: ICD-10-CM

## 2019-02-01 DIAGNOSIS — G62.9 NEUROPATHY: ICD-10-CM

## 2019-02-01 DIAGNOSIS — G47.33 OSA (OBSTRUCTIVE SLEEP APNEA): Primary | ICD-10-CM

## 2019-02-01 DIAGNOSIS — E66.01 MORBID OBESITY WITH BMI OF 50.0-59.9, ADULT (HCC): ICD-10-CM

## 2019-02-01 PROCEDURE — 1036F TOBACCO NON-USER: CPT | Performed by: NURSE PRACTITIONER

## 2019-02-01 PROCEDURE — G8427 DOCREV CUR MEDS BY ELIG CLIN: HCPCS | Performed by: NURSE PRACTITIONER

## 2019-02-01 PROCEDURE — G8482 FLU IMMUNIZE ORDER/ADMIN: HCPCS | Performed by: NURSE PRACTITIONER

## 2019-02-01 PROCEDURE — 99213 OFFICE O/P EST LOW 20 MIN: CPT | Performed by: NURSE PRACTITIONER

## 2019-02-01 PROCEDURE — G8417 CALC BMI ABV UP PARAM F/U: HCPCS | Performed by: NURSE PRACTITIONER

## 2019-02-05 ENCOUNTER — TELEPHONE (OUTPATIENT)
Dept: BARIATRICS/WEIGHT MGMT | Age: 39
End: 2019-02-05

## 2019-03-01 ENCOUNTER — TELEPHONE (OUTPATIENT)
Dept: INTERNAL MEDICINE | Age: 39
End: 2019-03-01

## 2019-03-11 ENCOUNTER — OFFICE VISIT (OUTPATIENT)
Dept: PULMONOLOGY | Age: 39
End: 2019-03-11
Payer: MEDICARE

## 2019-03-11 ENCOUNTER — HOSPITAL ENCOUNTER (OUTPATIENT)
Age: 39
Discharge: HOME | End: 2019-03-11
Payer: MEDICARE

## 2019-03-11 VITALS — HEART RATE: 70 BPM | WEIGHT: 256 LBS | OXYGEN SATURATION: 99 % | BODY MASS INDEX: 51.61 KG/M2 | HEIGHT: 59 IN

## 2019-03-11 VITALS
OXYGEN SATURATION: 98 % | HEIGHT: 58 IN | RESPIRATION RATE: 12 BRPM | DIASTOLIC BLOOD PRESSURE: 85 MMHG | HEART RATE: 74 BPM | SYSTOLIC BLOOD PRESSURE: 138 MMHG | WEIGHT: 256 LBS | BODY MASS INDEX: 53.74 KG/M2

## 2019-03-11 DIAGNOSIS — G47.33 OSA (OBSTRUCTIVE SLEEP APNEA): Primary | ICD-10-CM

## 2019-03-11 DIAGNOSIS — N94.6 PAINFUL MENSTRUAL PERIODS: ICD-10-CM

## 2019-03-11 DIAGNOSIS — Z99.89 OSA ON CPAP: Primary | ICD-10-CM

## 2019-03-11 DIAGNOSIS — I10 ESSENTIAL HYPERTENSION: ICD-10-CM

## 2019-03-11 DIAGNOSIS — R06.02 SHORTNESS OF BREATH: ICD-10-CM

## 2019-03-11 DIAGNOSIS — Z01.818 PRE-OP TESTING: ICD-10-CM

## 2019-03-11 DIAGNOSIS — E66.01 MORBID OBESITY WITH BMI OF 50.0-59.9, ADULT (HCC): ICD-10-CM

## 2019-03-11 DIAGNOSIS — G47.33 OSA ON CPAP: Primary | ICD-10-CM

## 2019-03-11 DIAGNOSIS — R60.9 EDEMA, UNSPECIFIED TYPE: ICD-10-CM

## 2019-03-11 DIAGNOSIS — N93.9 ABNORMAL UTERINE BLEEDING: ICD-10-CM

## 2019-03-11 DIAGNOSIS — R20.0 NUMBNESS: ICD-10-CM

## 2019-03-11 LAB
ALBUMIN (CALCULATED): 3.7 G/DL (ref 3.2–5.2)
ALBUMIN PERCENT: 64 % (ref 45–65)
ALPHA 1 PERCENT: 2 % (ref 3–6)
ALPHA 2 PERCENT: 10 % (ref 6–13)
ALPHA-1-GLOBULIN: 0.1 G/DL (ref 0.1–0.4)
ALPHA-2-GLOBULIN: 0.6 G/DL (ref 0.5–0.9)
BETA GLOBULIN: 0.7 G/DL (ref 0.5–1.1)
BETA PERCENT: 12 % (ref 11–19)
GAMMA GLOBULIN %: 12 % (ref 9–20)
GAMMA GLOBULIN: 0.7 G/DL (ref 0.5–1.5)
PATHOLOGIST: ABNORMAL
PATHOLOGIST: NORMAL
PROTEIN ELECTROPHORESIS, SERUM: ABNORMAL
SEDIMENTATION RATE, ERYTHROCYTE: 28 MM (ref 0–20)
SERUM IFX INTERP: NORMAL
TOTAL PROT. SUM,%: 100 % (ref 98–102)
TOTAL PROT. SUM: 5.8 G/DL (ref 6.3–8.2)
TOTAL PROTEIN: 6.7 G/DL (ref 6.4–8.3)

## 2019-03-11 PROCEDURE — 94729 DIFFUSING CAPACITY: CPT | Performed by: INTERNAL MEDICINE

## 2019-03-11 PROCEDURE — G8482 FLU IMMUNIZE ORDER/ADMIN: HCPCS | Performed by: INTERNAL MEDICINE

## 2019-03-11 PROCEDURE — 84156 ASSAY OF PROTEIN URINE: CPT

## 2019-03-11 PROCEDURE — 36415 COLL VENOUS BLD VENIPUNCTURE: CPT

## 2019-03-11 PROCEDURE — 84166 PROTEIN E-PHORESIS/URINE/CSF: CPT

## 2019-03-11 PROCEDURE — 84165 PROTEIN E-PHORESIS SERUM: CPT

## 2019-03-11 PROCEDURE — 86334 IMMUNOFIX E-PHORESIS SERUM: CPT

## 2019-03-11 PROCEDURE — 94010 BREATHING CAPACITY TEST: CPT | Performed by: INTERNAL MEDICINE

## 2019-03-11 PROCEDURE — 86038 ANTINUCLEAR ANTIBODIES: CPT

## 2019-03-11 PROCEDURE — 99214 OFFICE O/P EST MOD 30 MIN: CPT | Performed by: INTERNAL MEDICINE

## 2019-03-11 PROCEDURE — 85651 RBC SED RATE NONAUTOMATED: CPT

## 2019-03-11 PROCEDURE — G8427 DOCREV CUR MEDS BY ELIG CLIN: HCPCS | Performed by: INTERNAL MEDICINE

## 2019-03-11 PROCEDURE — 94726 PLETHYSMOGRAPHY LUNG VOLUMES: CPT | Performed by: INTERNAL MEDICINE

## 2019-03-11 PROCEDURE — 84155 ASSAY OF PROTEIN SERUM: CPT

## 2019-03-11 PROCEDURE — 1036F TOBACCO NON-USER: CPT | Performed by: INTERNAL MEDICINE

## 2019-03-11 PROCEDURE — G8417 CALC BMI ABV UP PARAM F/U: HCPCS | Performed by: INTERNAL MEDICINE

## 2019-03-11 ASSESSMENT — SLEEP AND FATIGUE QUESTIONNAIRES
HOW LIKELY ARE YOU TO NOD OFF OR FALL ASLEEP WHEN YOU ARE A PASSENGER IN A CAR FOR AN HOUR WITHOUT A BREAK: 2
HOW LIKELY ARE YOU TO NOD OFF OR FALL ASLEEP WHILE SITTING INACTIVE IN A PUBLIC PLACE: 0
HOW LIKELY ARE YOU TO NOD OFF OR FALL ASLEEP WHILE SITTING QUIETLY AFTER LUNCH WITHOUT ALCOHOL: 2
ESS TOTAL SCORE: 9
HOW LIKELY ARE YOU TO NOD OFF OR FALL ASLEEP WHILE SITTING AND TALKING TO SOMEONE: 0
HOW LIKELY ARE YOU TO NOD OFF OR FALL ASLEEP WHILE WATCHING TV: 2
HOW LIKELY ARE YOU TO NOD OFF OR FALL ASLEEP WHILE LYING DOWN TO REST IN THE AFTERNOON WHEN CIRCUMSTANCES PERMIT: 2
HOW LIKELY ARE YOU TO NOD OFF OR FALL ASLEEP IN A CAR, WHILE STOPPED FOR A FEW MINUTES IN TRAFFIC: 0
HOW LIKELY ARE YOU TO NOD OFF OR FALL ASLEEP WHILE SITTING AND READING: 1

## 2019-03-12 LAB
ANTI-NUCLEAR ANTIBODY (ANA): POSITIVE
P E INTERPRETATION, U: NORMAL
PATHOLOGIST: NORMAL
SPECIMEN TYPE: NORMAL
URINE TOTAL PROTEIN: 9 MG/DL

## 2019-03-18 ENCOUNTER — HOSPITAL ENCOUNTER (OUTPATIENT)
Dept: PREADMISSION TESTING | Age: 39
Discharge: HOME | End: 2019-03-22
Payer: MEDICARE

## 2019-03-18 ENCOUNTER — HOSPITAL ENCOUNTER (OUTPATIENT)
Dept: GENERAL RADIOLOGY | Age: 39
Discharge: HOME | End: 2019-03-20
Payer: MEDICARE

## 2019-03-18 ENCOUNTER — NURSE ONLY (OUTPATIENT)
Dept: BARIATRICS/WEIGHT MGMT | Age: 39
End: 2019-03-18

## 2019-03-18 VITALS
WEIGHT: 258 LBS | HEIGHT: 59 IN | TEMPERATURE: 97.9 F | HEART RATE: 66 BPM | SYSTOLIC BLOOD PRESSURE: 144 MMHG | RESPIRATION RATE: 16 BRPM | OXYGEN SATURATION: 99 % | DIASTOLIC BLOOD PRESSURE: 87 MMHG | BODY MASS INDEX: 52.01 KG/M2

## 2019-03-18 LAB
ANION GAP SERPL CALCULATED.3IONS-SCNC: 14 MMOL/L (ref 9–17)
BUN BLDV-MCNC: 10 MG/DL (ref 6–20)
BUN/CREAT BLD: ABNORMAL (ref 9–20)
CALCIUM SERPL-MCNC: 9.1 MG/DL (ref 8.6–10.4)
CHLORIDE BLD-SCNC: 103 MMOL/L (ref 98–107)
CO2: 21 MMOL/L (ref 20–31)
CREAT SERPL-MCNC: 0.54 MG/DL (ref 0.5–0.9)
GFR AFRICAN AMERICAN: >60 ML/MIN
GFR NON-AFRICAN AMERICAN: >60 ML/MIN
GFR SERPL CREATININE-BSD FRML MDRD: ABNORMAL ML/MIN/{1.73_M2}
GFR SERPL CREATININE-BSD FRML MDRD: ABNORMAL ML/MIN/{1.73_M2}
GLUCOSE BLD-MCNC: 90 MG/DL (ref 70–99)
HCT VFR BLD CALC: 42.8 % (ref 36.3–47.1)
HEMOGLOBIN: 14.3 G/DL (ref 11.9–15.1)
INR BLD: 1
MCH RBC QN AUTO: 30.8 PG (ref 25.2–33.5)
MCHC RBC AUTO-ENTMCNC: 33.4 G/DL (ref 28.4–34.8)
MCV RBC AUTO: 92.2 FL (ref 82.6–102.9)
NRBC AUTOMATED: 0 PER 100 WBC
PARTIAL THROMBOPLASTIN TIME: 23.9 SEC (ref 20.5–30.5)
PDW BLD-RTO: 13.2 % (ref 11.8–14.4)
PLATELET # BLD: 233 K/UL (ref 138–453)
PMV BLD AUTO: 12 FL (ref 8.1–13.5)
POTASSIUM SERPL-SCNC: 3.6 MMOL/L (ref 3.7–5.3)
PROTHROMBIN TIME: 10.3 SEC (ref 9–12)
RBC # BLD: 4.64 M/UL (ref 3.95–5.11)
SODIUM BLD-SCNC: 138 MMOL/L (ref 135–144)
WBC # BLD: 8.8 K/UL (ref 3.5–11.3)

## 2019-03-18 PROCEDURE — 85610 PROTHROMBIN TIME: CPT

## 2019-03-18 PROCEDURE — 36415 COLL VENOUS BLD VENIPUNCTURE: CPT

## 2019-03-18 PROCEDURE — G0480 DRUG TEST DEF 1-7 CLASSES: HCPCS

## 2019-03-18 PROCEDURE — 85730 THROMBOPLASTIN TIME PARTIAL: CPT

## 2019-03-18 PROCEDURE — 85027 COMPLETE CBC AUTOMATED: CPT

## 2019-03-18 PROCEDURE — 80048 BASIC METABOLIC PNL TOTAL CA: CPT

## 2019-03-18 PROCEDURE — 93005 ELECTROCARDIOGRAM TRACING: CPT

## 2019-03-18 PROCEDURE — 71046 X-RAY EXAM CHEST 2 VIEWS: CPT

## 2019-03-18 RX ORDER — SODIUM CHLORIDE, SODIUM LACTATE, POTASSIUM CHLORIDE, CALCIUM CHLORIDE 600; 310; 30; 20 MG/100ML; MG/100ML; MG/100ML; MG/100ML
1000 INJECTION, SOLUTION INTRAVENOUS CONTINUOUS
Status: CANCELLED | OUTPATIENT
Start: 2019-03-18

## 2019-03-18 RX ORDER — TOPIRAMATE 25 MG/1
50 TABLET ORAL 2 TIMES DAILY
COMMUNITY
End: 2019-04-15

## 2019-03-18 NOTE — H&P (VIEW-ONLY)
History and Physical    Pt Name: Angelito Diaz  MRN: [id number]  YOB: 1980  Date of evaluation: 3/18/2019  Primary Care Physician: Willam Molina MD  Patient evaluated at the request of  Dr. Johana Dsouza     Reason for evaluation:   Morbid obesity    SUBJECTIVE:   History of Chief Complaint:    Sherryle Dunning is a 41 y.o. female who has struggled with her weight for many years Her highest weight has Been 327 lbs approx ,  her lowest weight in the last 5 yrs has been 162 lbs ,  she has tried diet and exercise , yet unable to achieve adequate weight removal .         Past Medical History      has a past medical history of Gastric reflux, Neuropathy, Obesities, morbid ([address] and TINO (obstructive sleep apnea). Past Surgical History     has a past surgical history that includes Tubal ligation and Upper gastrointestinal endoscopy (N/A, 12/7/2018). Medications  Current Meds:   Current Outpatient Rx   Medication Sig Dispense Refill    pantoprazole (PROTONIX) 40 MG tablet Take 1 tablet by mouth daily 60 tablet 3    gabapentin (NEURONTIN) 100 MG capsule Take 1 capsule by mouth 3 times daily for 90 days. . 90 capsule 2    hydrochlorothiazide (MICROZIDE) 12.5 MG capsule Take 1 capsule by mouth daily 30 capsule 3    topiramate (TOPAMAX) 50 MG tablet 1/2 tab PO BID for 3 days then 50 mg BID 60 tablet 2    vitamin D (ERGOCALCIFEROL) 29460 units CAPS capsule Take 1 capsule by mouth once a week for 12 doses 12 capsule 0    ibuprofen (ADVIL;MOTRIN) 800 MG tablet Take 1 tablet by mouth every 8 hours as needed for Pain 60 tablet 0    omeprazole (PRILOSEC OTC) 20 MG tablet Take 1 tablet by mouth daily (with breakfast) 30 tablet 3     Continuous Infusions:  PRN Meds:. Allergies  is allergic to latex.     Family History  family history includes Cancer in her father and maternal grandfather; Diabetes in her father, mother, paternal grandfather, and paternal grandmother; Heart Disease in her father, mother, paternal grandfather, and paternal uncle; High Blood Pressure in her father, mother, paternal grandfather, paternal grandmother, and sister; Kidney Disease in her paternal aunt; Liver Disease in her father; Migraines in her maternal grandmother and mother; Neuropathy in her sister; Other in her paternal grandmother; Stroke in her maternal grandfather; Thyroid Disease in her father, mother, and sister.     Family Status   Relation Name Status    Mother  (Not Specified)    Father  (Not Specified)    Sister  (Not Specified)    PAunt      PUnc      MGM      MGF      [address]      PGF           Social History  Social History     Socioeconomic History    Marital status:      Spouse name: Not on file    Number of children: Not on file    Years of education: Not on file    Highest education level: Not on file   Occupational History    Not on file   Social Needs    Financial resource strain: Not on file    Food insecurity:     Worry: Not on file     Inability: Not on file    Transportation needs:     Medical: Not on file     Non-medical: Not on file   Tobacco Use    Smoking status: Never Smoker    Smokeless tobacco: Never Used   Substance and Sexual Activity    Alcohol use: No    Drug use: No    Sexual activity: Never   Lifestyle    Physical activity:     Days per week: Not on file     Minutes per session: Not on file    Stress: Not on file   Relationships    Social connections:     Talks on phone: Not on file     Gets together: Not on file     Attends [employer]ism service: Not on file     Active member of club or organization: Not on file     Attends meetings of clubs or organizations: Not on file     Relationship status: Not on file    Intimate partner violence:     Fear of current or ex partner: Not on file     Emotionally abused: Not on file     Physically abused: Not on file     Forced sexual activity: Not on file   Other Topics Concern    Not on

## 2019-03-19 LAB
EKG ATRIAL RATE: 64 BPM
EKG P AXIS: 27 DEGREES
EKG P-R INTERVAL: 188 MS
EKG Q-T INTERVAL: 442 MS
EKG QRS DURATION: 106 MS
EKG QTC CALCULATION (BAZETT): 455 MS
EKG R AXIS: 10 DEGREES
EKG T AXIS: 16 DEGREES
EKG VENTRICULAR RATE: 64 BPM

## 2019-03-20 LAB
3-OH-COTININE: <2 NG/ML
COTININE: <2 NG/ML
NICOTINE: <2 NG/ML

## 2019-03-26 ENCOUNTER — OFFICE VISIT (OUTPATIENT)
Dept: INTERNAL MEDICINE | Age: 39
End: 2019-03-26
Payer: MEDICARE

## 2019-03-26 VITALS
BODY MASS INDEX: 51.53 KG/M2 | SYSTOLIC BLOOD PRESSURE: 127 MMHG | HEART RATE: 77 BPM | DIASTOLIC BLOOD PRESSURE: 88 MMHG | HEIGHT: 59 IN | WEIGHT: 255.6 LBS

## 2019-03-26 DIAGNOSIS — J01.00 ACUTE NON-RECURRENT MAXILLARY SINUSITIS: Primary | ICD-10-CM

## 2019-03-26 DIAGNOSIS — G47.33 OSA (OBSTRUCTIVE SLEEP APNEA): ICD-10-CM

## 2019-03-26 PROCEDURE — 99211 OFF/OP EST MAY X REQ PHY/QHP: CPT | Performed by: INTERNAL MEDICINE

## 2019-03-26 PROCEDURE — G8417 CALC BMI ABV UP PARAM F/U: HCPCS | Performed by: STUDENT IN AN ORGANIZED HEALTH CARE EDUCATION/TRAINING PROGRAM

## 2019-03-26 PROCEDURE — 99213 OFFICE O/P EST LOW 20 MIN: CPT | Performed by: STUDENT IN AN ORGANIZED HEALTH CARE EDUCATION/TRAINING PROGRAM

## 2019-03-26 PROCEDURE — G8427 DOCREV CUR MEDS BY ELIG CLIN: HCPCS | Performed by: STUDENT IN AN ORGANIZED HEALTH CARE EDUCATION/TRAINING PROGRAM

## 2019-03-26 PROCEDURE — G8482 FLU IMMUNIZE ORDER/ADMIN: HCPCS | Performed by: STUDENT IN AN ORGANIZED HEALTH CARE EDUCATION/TRAINING PROGRAM

## 2019-03-26 PROCEDURE — 1036F TOBACCO NON-USER: CPT | Performed by: STUDENT IN AN ORGANIZED HEALTH CARE EDUCATION/TRAINING PROGRAM

## 2019-03-26 RX ORDER — ECHINACEA PURPUREA EXTRACT 125 MG
1 TABLET ORAL PRN
Qty: 1 BOTTLE | Refills: 3 | Status: SHIPPED | OUTPATIENT
Start: 2019-03-26 | End: 2019-05-03 | Stop reason: ALTCHOICE

## 2019-03-26 RX ORDER — FLUTICASONE PROPIONATE 50 MCG
1 SPRAY, SUSPENSION (ML) NASAL DAILY
Qty: 2 BOTTLE | Refills: 1 | Status: SHIPPED | OUTPATIENT
Start: 2019-03-26 | End: 2019-05-03 | Stop reason: SINTOL

## 2019-03-26 ASSESSMENT — PATIENT HEALTH QUESTIONNAIRE - PHQ9
SUM OF ALL RESPONSES TO PHQ9 QUESTIONS 1 & 2: 0
2. FEELING DOWN, DEPRESSED OR HOPELESS: 0
SUM OF ALL RESPONSES TO PHQ QUESTIONS 1-9: 0
SUM OF ALL RESPONSES TO PHQ QUESTIONS 1-9: 0
1. LITTLE INTEREST OR PLEASURE IN DOING THINGS: 0

## 2019-03-27 ENCOUNTER — OFFICE VISIT (OUTPATIENT)
Dept: BARIATRICS/WEIGHT MGMT | Age: 39
End: 2019-03-27
Payer: MEDICARE

## 2019-03-27 VITALS
WEIGHT: 252 LBS | HEART RATE: 80 BPM | DIASTOLIC BLOOD PRESSURE: 74 MMHG | HEIGHT: 59 IN | BODY MASS INDEX: 50.8 KG/M2 | SYSTOLIC BLOOD PRESSURE: 130 MMHG

## 2019-03-27 DIAGNOSIS — E66.01 MORBID OBESITY (HCC): ICD-10-CM

## 2019-03-27 DIAGNOSIS — K21.9 GASTROESOPHAGEAL REFLUX DISEASE WITHOUT ESOPHAGITIS: Primary | ICD-10-CM

## 2019-03-27 DIAGNOSIS — G47.33 OBSTRUCTIVE SLEEP APNEA: ICD-10-CM

## 2019-03-27 PROCEDURE — G8417 CALC BMI ABV UP PARAM F/U: HCPCS | Performed by: SURGERY

## 2019-03-27 PROCEDURE — G8427 DOCREV CUR MEDS BY ELIG CLIN: HCPCS | Performed by: SURGERY

## 2019-03-27 PROCEDURE — 1036F TOBACCO NON-USER: CPT | Performed by: SURGERY

## 2019-03-27 PROCEDURE — 99213 OFFICE O/P EST LOW 20 MIN: CPT | Performed by: SURGERY

## 2019-03-27 PROCEDURE — G8482 FLU IMMUNIZE ORDER/ADMIN: HCPCS | Performed by: SURGERY

## 2019-03-29 ENCOUNTER — NURSE ONLY (OUTPATIENT)
Dept: BARIATRICS/WEIGHT MGMT | Age: 39
End: 2019-03-29

## 2019-03-31 ENCOUNTER — ANESTHESIA EVENT (OUTPATIENT)
Dept: OPERATING ROOM | Age: 39
DRG: 403 | End: 2019-03-31
Payer: MEDICARE

## 2019-04-01 ENCOUNTER — ANESTHESIA (OUTPATIENT)
Dept: OPERATING ROOM | Age: 39
DRG: 403 | End: 2019-04-01
Payer: MEDICARE

## 2019-04-01 ENCOUNTER — HOSPITAL ENCOUNTER (INPATIENT)
Age: 39
LOS: 2 days | Discharge: HOME | DRG: 403 | End: 2019-04-03
Attending: SURGERY | Admitting: SURGERY
Payer: MEDICARE

## 2019-04-01 VITALS — TEMPERATURE: 93.2 F | SYSTOLIC BLOOD PRESSURE: 145 MMHG | DIASTOLIC BLOOD PRESSURE: 76 MMHG | OXYGEN SATURATION: 96 %

## 2019-04-01 DIAGNOSIS — G89.18 ACUTE POST-OPERATIVE PAIN: Primary | ICD-10-CM

## 2019-04-01 PROBLEM — Z98.84 STATUS POST GASTRIC BYPASS FOR OBESITY: Status: ACTIVE | Noted: 2019-04-01

## 2019-04-01 LAB
ANION GAP SERPL CALCULATED.3IONS-SCNC: 16 MMOL/L (ref 9–17)
BUN BLDV-MCNC: 11 MG/DL (ref 6–20)
BUN/CREAT BLD: ABNORMAL (ref 9–20)
CALCIUM SERPL-MCNC: 8.7 MG/DL (ref 8.6–10.4)
CHLORIDE BLD-SCNC: 104 MMOL/L (ref 98–107)
CO2: 18 MMOL/L (ref 20–31)
CREAT SERPL-MCNC: 0.71 MG/DL (ref 0.5–0.9)
GFR AFRICAN AMERICAN: >60 ML/MIN
GFR NON-AFRICAN AMERICAN: >60 ML/MIN
GFR SERPL CREATININE-BSD FRML MDRD: ABNORMAL ML/MIN/{1.73_M2}
GFR SERPL CREATININE-BSD FRML MDRD: ABNORMAL ML/MIN/{1.73_M2}
GLUCOSE BLD-MCNC: 184 MG/DL (ref 70–99)
HCG, PREGNANCY URINE (POC): NEGATIVE
HCT VFR BLD CALC: 45.9 % (ref 36.3–47.1)
HEMOGLOBIN: 15.4 G/DL (ref 11.9–15.1)
MCH RBC QN AUTO: 31.3 PG (ref 25.2–33.5)
MCHC RBC AUTO-ENTMCNC: 33.6 G/DL (ref 28.4–34.8)
MCV RBC AUTO: 93.3 FL (ref 82.6–102.9)
NRBC AUTOMATED: 0 PER 100 WBC
PDW BLD-RTO: 13.5 % (ref 11.8–14.4)
PLATELET # BLD: 208 K/UL (ref 138–453)
PMV BLD AUTO: 12 FL (ref 8.1–13.5)
POC POTASSIUM: 3.4 MMOL/L (ref 3.5–4.5)
POTASSIUM SERPL-SCNC: 3.9 MMOL/L (ref 3.7–5.3)
RBC # BLD: 4.92 M/UL (ref 3.95–5.11)
SODIUM BLD-SCNC: 138 MMOL/L (ref 135–144)
WBC # BLD: 19.8 K/UL (ref 3.5–11.3)

## 2019-04-01 PROCEDURE — 3700000000 HC ANESTHESIA ATTENDED CARE: Performed by: SURGERY

## 2019-04-01 PROCEDURE — S2900 ROBOTIC SURGICAL SYSTEM: HCPCS | Performed by: SURGERY

## 2019-04-01 PROCEDURE — 2580000003 HC RX 258: Performed by: SURGERY

## 2019-04-01 PROCEDURE — 8E0W4CZ ROBOTIC ASSISTED PROCEDURE OF TRUNK REGION, PERCUTANEOUS ENDOSCOPIC APPROACH: ICD-10-PCS | Performed by: SURGERY

## 2019-04-01 PROCEDURE — 2580000003 HC RX 258: Performed by: ANESTHESIOLOGY

## 2019-04-01 PROCEDURE — 2500000003 HC RX 250 WO HCPCS: Performed by: NURSE ANESTHETIST, CERTIFIED REGISTERED

## 2019-04-01 PROCEDURE — 6370000000 HC RX 637 (ALT 250 FOR IP): Performed by: SURGERY

## 2019-04-01 PROCEDURE — 2720000010 HC SURG SUPPLY STERILE: Performed by: SURGERY

## 2019-04-01 PROCEDURE — 0DBA8ZX EXCISION OF JEJUNUM, VIA NATURAL OR ARTIFICIAL OPENING ENDOSCOPIC, DIAGNOSTIC: ICD-10-PCS | Performed by: SURGERY

## 2019-04-01 PROCEDURE — 80048 BASIC METABOLIC PNL TOTAL CA: CPT

## 2019-04-01 PROCEDURE — 7100000000 HC PACU RECOVERY - FIRST 15 MIN: Performed by: SURGERY

## 2019-04-01 PROCEDURE — 7100000001 HC PACU RECOVERY - ADDTL 15 MIN: Performed by: SURGERY

## 2019-04-01 PROCEDURE — 94640 AIRWAY INHALATION TREATMENT: CPT

## 2019-04-01 PROCEDURE — 6370000000 HC RX 637 (ALT 250 FOR IP): Performed by: NURSE ANESTHETIST, CERTIFIED REGISTERED

## 2019-04-01 PROCEDURE — 6360000002 HC RX W HCPCS: Performed by: NURSE ANESTHETIST, CERTIFIED REGISTERED

## 2019-04-01 PROCEDURE — 2580000003 HC RX 258: Performed by: NURSE ANESTHETIST, CERTIFIED REGISTERED

## 2019-04-01 PROCEDURE — 1200000000 HC SEMI PRIVATE

## 2019-04-01 PROCEDURE — 84132 ASSAY OF SERUM POTASSIUM: CPT

## 2019-04-01 PROCEDURE — 85027 COMPLETE CBC AUTOMATED: CPT

## 2019-04-01 PROCEDURE — 6360000002 HC RX W HCPCS: Performed by: SURGERY

## 2019-04-01 PROCEDURE — 2500000003 HC RX 250 WO HCPCS: Performed by: ANESTHESIOLOGY

## 2019-04-01 PROCEDURE — 3600000019 HC SURGERY ROBOT ADDTL 15MIN: Performed by: SURGERY

## 2019-04-01 PROCEDURE — 2709999900 HC NON-CHARGEABLE SUPPLY: Performed by: SURGERY

## 2019-04-01 PROCEDURE — 6360000002 HC RX W HCPCS: Performed by: ANESTHESIOLOGY

## 2019-04-01 PROCEDURE — 0D164ZA BYPASS STOMACH TO JEJUNUM, PERCUTANEOUS ENDOSCOPIC APPROACH: ICD-10-PCS | Performed by: SURGERY

## 2019-04-01 PROCEDURE — 3700000001 HC ADD 15 MINUTES (ANESTHESIA): Performed by: SURGERY

## 2019-04-01 PROCEDURE — 2500000003 HC RX 250 WO HCPCS: Performed by: SURGERY

## 2019-04-01 PROCEDURE — 43644 LAP GASTRIC BYPASS/ROUX-EN-Y: CPT | Performed by: SURGERY

## 2019-04-01 PROCEDURE — 84703 CHORIONIC GONADOTROPIN ASSAY: CPT

## 2019-04-01 PROCEDURE — 3600000009 HC SURGERY ROBOT BASE: Performed by: SURGERY

## 2019-04-01 PROCEDURE — 94760 N-INVAS EAR/PLS OXIMETRY 1: CPT

## 2019-04-01 RX ORDER — SODIUM CHLORIDE, SODIUM LACTATE, POTASSIUM CHLORIDE, CALCIUM CHLORIDE 600; 310; 30; 20 MG/100ML; MG/100ML; MG/100ML; MG/100ML
INJECTION, SOLUTION INTRAVENOUS CONTINUOUS
Status: DISCONTINUED | OUTPATIENT
Start: 2019-04-01 | End: 2019-04-03

## 2019-04-01 RX ORDER — GABAPENTIN 600 MG/1
300 TABLET ORAL DAILY
Status: DISCONTINUED | OUTPATIENT
Start: 2019-04-01 | End: 2019-04-03 | Stop reason: HOSPADM

## 2019-04-01 RX ORDER — LIDOCAINE HYDROCHLORIDE ANHYDROUS AND DEXTROSE MONOHYDRATE .4; 5 G/100ML; G/100ML
INJECTION, SOLUTION INTRAVENOUS CONTINUOUS PRN
Status: DISCONTINUED | OUTPATIENT
Start: 2019-04-01 | End: 2019-04-01 | Stop reason: SDUPTHER

## 2019-04-01 RX ORDER — DIPHENHYDRAMINE HYDROCHLORIDE 50 MG/ML
INJECTION INTRAMUSCULAR; INTRAVENOUS PRN
Status: DISCONTINUED | OUTPATIENT
Start: 2019-04-01 | End: 2019-04-01 | Stop reason: SDUPTHER

## 2019-04-01 RX ORDER — NEOSTIGMINE METHYLSULFATE 5 MG/5 ML
SYRINGE (ML) INTRAVENOUS PRN
Status: DISCONTINUED | OUTPATIENT
Start: 2019-04-01 | End: 2019-04-01 | Stop reason: SDUPTHER

## 2019-04-01 RX ORDER — SCOLOPAMINE TRANSDERMAL SYSTEM 1 MG/1
1 PATCH, EXTENDED RELEASE TRANSDERMAL
Status: DISCONTINUED | OUTPATIENT
Start: 2019-04-01 | End: 2019-04-03 | Stop reason: HOSPADM

## 2019-04-01 RX ORDER — ESMOLOL HYDROCHLORIDE 10 MG/ML
INJECTION INTRAVENOUS PRN
Status: DISCONTINUED | OUTPATIENT
Start: 2019-04-01 | End: 2019-04-01 | Stop reason: SDUPTHER

## 2019-04-01 RX ORDER — SODIUM CHLORIDE 0.9 % (FLUSH) 0.9 %
10 SYRINGE (ML) INJECTION EVERY 12 HOURS SCHEDULED
Status: DISCONTINUED | OUTPATIENT
Start: 2019-04-01 | End: 2019-04-03 | Stop reason: HOSPADM

## 2019-04-01 RX ORDER — IPRATROPIUM BROMIDE AND ALBUTEROL SULFATE 2.5; .5 MG/3ML; MG/3ML
1 SOLUTION RESPIRATORY (INHALATION)
Status: DISCONTINUED | OUTPATIENT
Start: 2019-04-01 | End: 2019-04-03 | Stop reason: HOSPADM

## 2019-04-01 RX ORDER — PROMETHAZINE HYDROCHLORIDE 12.5 MG/1
12.5 TABLET ORAL EVERY 6 HOURS PRN
Qty: 20 TABLET | Refills: 0 | Status: SHIPPED | OUTPATIENT
Start: 2019-04-01 | End: 2019-04-12 | Stop reason: SDUPTHER

## 2019-04-01 RX ORDER — BUPIVACAINE HYDROCHLORIDE 5 MG/ML
30 INJECTION, SOLUTION EPIDURAL; INTRACAUDAL ONCE
Status: DISCONTINUED | OUTPATIENT
Start: 2019-04-01 | End: 2019-04-01 | Stop reason: HOSPADM

## 2019-04-01 RX ORDER — MIDAZOLAM HYDROCHLORIDE 1 MG/ML
1 INJECTION INTRAMUSCULAR; INTRAVENOUS EVERY 10 MIN PRN
Status: DISCONTINUED | OUTPATIENT
Start: 2019-04-01 | End: 2019-04-01 | Stop reason: HOSPADM

## 2019-04-01 RX ORDER — SODIUM CHLORIDE 0.9 % (FLUSH) 0.9 %
10 SYRINGE (ML) INJECTION PRN
Status: DISCONTINUED | OUTPATIENT
Start: 2019-04-01 | End: 2019-04-01 | Stop reason: HOSPADM

## 2019-04-01 RX ORDER — BUPIVACAINE HYDROCHLORIDE 5 MG/ML
INJECTION, SOLUTION EPIDURAL; INTRACAUDAL PRN
Status: DISCONTINUED | OUTPATIENT
Start: 2019-04-01 | End: 2019-04-01 | Stop reason: ALTCHOICE

## 2019-04-01 RX ORDER — PROMETHAZINE HYDROCHLORIDE 25 MG/ML
12.5 INJECTION, SOLUTION INTRAMUSCULAR; INTRAVENOUS EVERY 6 HOURS
Status: DISCONTINUED | OUTPATIENT
Start: 2019-04-01 | End: 2019-04-03 | Stop reason: HOSPADM

## 2019-04-01 RX ORDER — SODIUM CHLORIDE 0.9 % (FLUSH) 0.9 %
10 SYRINGE (ML) INJECTION PRN
Status: DISCONTINUED | OUTPATIENT
Start: 2019-04-01 | End: 2019-04-03 | Stop reason: HOSPADM

## 2019-04-01 RX ORDER — GLYCOPYRROLATE 1 MG/5 ML
SYRINGE (ML) INTRAVENOUS PRN
Status: DISCONTINUED | OUTPATIENT
Start: 2019-04-01 | End: 2019-04-01 | Stop reason: SDUPTHER

## 2019-04-01 RX ORDER — OXYCODONE HYDROCHLORIDE AND ACETAMINOPHEN 5; 325 MG/1; MG/1
1 TABLET ORAL EVERY 6 HOURS PRN
Qty: 28 TABLET | Refills: 0 | Status: SHIPPED | OUTPATIENT
Start: 2019-04-01 | End: 2019-04-08

## 2019-04-01 RX ORDER — DEXAMETHASONE SODIUM PHOSPHATE 10 MG/ML
INJECTION INTRAMUSCULAR; INTRAVENOUS PRN
Status: DISCONTINUED | OUTPATIENT
Start: 2019-04-01 | End: 2019-04-01 | Stop reason: SDUPTHER

## 2019-04-01 RX ORDER — FENTANYL CITRATE 50 UG/ML
INJECTION, SOLUTION INTRAMUSCULAR; INTRAVENOUS PRN
Status: DISCONTINUED | OUTPATIENT
Start: 2019-04-01 | End: 2019-04-01 | Stop reason: SDUPTHER

## 2019-04-01 RX ORDER — HEPARIN SODIUM 5000 [USP'U]/ML
5000 INJECTION, SOLUTION INTRAVENOUS; SUBCUTANEOUS ONCE
Status: COMPLETED | OUTPATIENT
Start: 2019-04-01 | End: 2019-04-01

## 2019-04-01 RX ORDER — FENTANYL CITRATE 50 UG/ML
25 INJECTION, SOLUTION INTRAMUSCULAR; INTRAVENOUS EVERY 5 MIN PRN
Status: DISCONTINUED | OUTPATIENT
Start: 2019-04-01 | End: 2019-04-01 | Stop reason: HOSPADM

## 2019-04-01 RX ORDER — ROCURONIUM BROMIDE 10 MG/ML
INJECTION, SOLUTION INTRAVENOUS PRN
Status: DISCONTINUED | OUTPATIENT
Start: 2019-04-01 | End: 2019-04-01 | Stop reason: SDUPTHER

## 2019-04-01 RX ORDER — LIDOCAINE HYDROCHLORIDE 10 MG/ML
1 INJECTION, SOLUTION EPIDURAL; INFILTRATION; INTRACAUDAL; PERINEURAL
Status: DISCONTINUED | OUTPATIENT
Start: 2019-04-01 | End: 2019-04-01 | Stop reason: HOSPADM

## 2019-04-01 RX ORDER — MAGNESIUM HYDROXIDE 1200 MG/15ML
LIQUID ORAL CONTINUOUS PRN
Status: COMPLETED | OUTPATIENT
Start: 2019-04-01 | End: 2019-04-01

## 2019-04-01 RX ORDER — SODIUM CHLORIDE, SODIUM LACTATE, POTASSIUM CHLORIDE, CALCIUM CHLORIDE 600; 310; 30; 20 MG/100ML; MG/100ML; MG/100ML; MG/100ML
INJECTION, SOLUTION INTRAVENOUS CONTINUOUS
Status: DISCONTINUED | OUTPATIENT
Start: 2019-04-01 | End: 2019-04-01

## 2019-04-01 RX ORDER — SODIUM CHLORIDE, SODIUM LACTATE, POTASSIUM CHLORIDE, CALCIUM CHLORIDE 600; 310; 30; 20 MG/100ML; MG/100ML; MG/100ML; MG/100ML
1000 INJECTION, SOLUTION INTRAVENOUS CONTINUOUS
Status: DISCONTINUED | OUTPATIENT
Start: 2019-04-01 | End: 2019-04-01

## 2019-04-01 RX ORDER — SODIUM CHLORIDE 0.9 % (FLUSH) 0.9 %
10 SYRINGE (ML) INJECTION EVERY 12 HOURS SCHEDULED
Status: DISCONTINUED | OUTPATIENT
Start: 2019-04-01 | End: 2019-04-01 | Stop reason: HOSPADM

## 2019-04-01 RX ORDER — MIDAZOLAM HYDROCHLORIDE 1 MG/ML
2 INJECTION INTRAMUSCULAR; INTRAVENOUS
Status: ACTIVE | OUTPATIENT
Start: 2019-04-01 | End: 2019-04-01

## 2019-04-01 RX ORDER — ONDANSETRON 2 MG/ML
INJECTION INTRAMUSCULAR; INTRAVENOUS PRN
Status: DISCONTINUED | OUTPATIENT
Start: 2019-04-01 | End: 2019-04-01 | Stop reason: SDUPTHER

## 2019-04-01 RX ORDER — PROPOFOL 10 MG/ML
INJECTION, EMULSION INTRAVENOUS PRN
Status: DISCONTINUED | OUTPATIENT
Start: 2019-04-01 | End: 2019-04-01 | Stop reason: SDUPTHER

## 2019-04-01 RX ORDER — KETAMINE HYDROCHLORIDE 10 MG/ML
INJECTION, SOLUTION INTRAMUSCULAR; INTRAVENOUS PRN
Status: DISCONTINUED | OUTPATIENT
Start: 2019-04-01 | End: 2019-04-01 | Stop reason: SDUPTHER

## 2019-04-01 RX ORDER — ONDANSETRON 2 MG/ML
4 INJECTION INTRAMUSCULAR; INTRAVENOUS EVERY 6 HOURS PRN
Status: DISCONTINUED | OUTPATIENT
Start: 2019-04-01 | End: 2019-04-03 | Stop reason: HOSPADM

## 2019-04-01 RX ORDER — LIDOCAINE HYDROCHLORIDE 10 MG/ML
INJECTION, SOLUTION EPIDURAL; INFILTRATION; INTRACAUDAL; PERINEURAL PRN
Status: DISCONTINUED | OUTPATIENT
Start: 2019-04-01 | End: 2019-04-01 | Stop reason: SDUPTHER

## 2019-04-01 RX ORDER — ACETAMINOPHEN 10 MG/ML
INJECTION, SOLUTION INTRAVENOUS PRN
Status: DISCONTINUED | OUTPATIENT
Start: 2019-04-01 | End: 2019-04-01 | Stop reason: SDUPTHER

## 2019-04-01 RX ORDER — LABETALOL HYDROCHLORIDE 5 MG/ML
10 INJECTION, SOLUTION INTRAVENOUS
Status: DISCONTINUED | OUTPATIENT
Start: 2019-04-01 | End: 2019-04-01 | Stop reason: HOSPADM

## 2019-04-01 RX ORDER — FENTANYL CITRATE 50 UG/ML
100 INJECTION, SOLUTION INTRAMUSCULAR; INTRAVENOUS ONCE
Status: DISCONTINUED | OUTPATIENT
Start: 2019-04-01 | End: 2019-04-03 | Stop reason: HOSPADM

## 2019-04-01 RX ORDER — FENTANYL CITRATE 50 UG/ML
50 INJECTION, SOLUTION INTRAMUSCULAR; INTRAVENOUS EVERY 5 MIN PRN
Status: DISCONTINUED | OUTPATIENT
Start: 2019-04-01 | End: 2019-04-01 | Stop reason: HOSPADM

## 2019-04-01 RX ORDER — HEPARIN SODIUM 5000 [USP'U]/ML
5000 INJECTION, SOLUTION INTRAVENOUS; SUBCUTANEOUS EVERY 8 HOURS SCHEDULED
Status: DISCONTINUED | OUTPATIENT
Start: 2019-04-01 | End: 2019-04-03 | Stop reason: HOSPADM

## 2019-04-01 RX ORDER — MIDAZOLAM HYDROCHLORIDE 1 MG/ML
INJECTION INTRAMUSCULAR; INTRAVENOUS PRN
Status: DISCONTINUED | OUTPATIENT
Start: 2019-04-01 | End: 2019-04-01 | Stop reason: SDUPTHER

## 2019-04-01 RX ORDER — ALBUTEROL SULFATE 90 UG/1
AEROSOL, METERED RESPIRATORY (INHALATION) PRN
Status: DISCONTINUED | OUTPATIENT
Start: 2019-04-01 | End: 2019-04-01 | Stop reason: SDUPTHER

## 2019-04-01 RX ADMIN — METRONIDAZOLE 500 MG: 500 INJECTION, SOLUTION INTRAVENOUS at 17:51

## 2019-04-01 RX ADMIN — PHENYLEPHRINE HYDROCHLORIDE 200 MCG: 10 INJECTION INTRAVENOUS at 09:34

## 2019-04-01 RX ADMIN — HEPARIN SODIUM 5000 UNITS: 5000 INJECTION INTRAVENOUS; SUBCUTANEOUS at 21:50

## 2019-04-01 RX ADMIN — SODIUM CHLORIDE, POTASSIUM CHLORIDE, SODIUM LACTATE AND CALCIUM CHLORIDE: 600; 310; 30; 20 INJECTION, SOLUTION INTRAVENOUS at 08:16

## 2019-04-01 RX ADMIN — Medication 2 G: at 16:40

## 2019-04-01 RX ADMIN — PHENYLEPHRINE HYDROCHLORIDE 200 MCG: 10 INJECTION INTRAVENOUS at 10:05

## 2019-04-01 RX ADMIN — Medication 3 MG: at 10:23

## 2019-04-01 RX ADMIN — FENTANYL CITRATE 50 MCG: 50 INJECTION INTRAMUSCULAR; INTRAVENOUS at 10:51

## 2019-04-01 RX ADMIN — Medication 2 G: at 07:35

## 2019-04-01 RX ADMIN — PHENYLEPHRINE HYDROCHLORIDE 100 MCG: 10 INJECTION INTRAVENOUS at 09:39

## 2019-04-01 RX ADMIN — FAMOTIDINE 20 MG: 10 INJECTION, SOLUTION INTRAVENOUS at 16:40

## 2019-04-01 RX ADMIN — LABETALOL 20 MG/4 ML (5 MG/ML) INTRAVENOUS SYRINGE 10 MG: at 13:20

## 2019-04-01 RX ADMIN — ACETAMINOPHEN 1000 MG: 10 INJECTION, SOLUTION INTRAVENOUS at 09:56

## 2019-04-01 RX ADMIN — ROCURONIUM BROMIDE 10 MG: 10 INJECTION INTRAVENOUS at 08:02

## 2019-04-01 RX ADMIN — PROPOFOL 30 MG: 10 INJECTION, EMULSION INTRAVENOUS at 10:27

## 2019-04-01 RX ADMIN — LIDOCAINE HYDROCHLORIDE 2 MG/MIN: 4 INJECTION, SOLUTION INTRAVENOUS at 07:25

## 2019-04-01 RX ADMIN — ALBUTEROL SULFATE 12 PUFF: 90 AEROSOL, METERED RESPIRATORY (INHALATION) at 10:38

## 2019-04-01 RX ADMIN — PHENYLEPHRINE HYDROCHLORIDE 100 MCG: 10 INJECTION INTRAVENOUS at 07:46

## 2019-04-01 RX ADMIN — Medication 0.6 MG: at 10:22

## 2019-04-01 RX ADMIN — FENTANYL CITRATE 50 MCG: 50 INJECTION INTRAMUSCULAR; INTRAVENOUS at 10:57

## 2019-04-01 RX ADMIN — SODIUM CHLORIDE, POTASSIUM CHLORIDE, SODIUM LACTATE AND CALCIUM CHLORIDE: 600; 310; 30; 20 INJECTION, SOLUTION INTRAVENOUS at 08:30

## 2019-04-01 RX ADMIN — MIDAZOLAM HYDROCHLORIDE 2 MG: 1 INJECTION, SOLUTION INTRAMUSCULAR; INTRAVENOUS at 07:10

## 2019-04-01 RX ADMIN — PHENYLEPHRINE HYDROCHLORIDE 100 MCG: 10 INJECTION INTRAVENOUS at 08:06

## 2019-04-01 RX ADMIN — PHENYLEPHRINE HYDROCHLORIDE 100 MCG: 10 INJECTION INTRAVENOUS at 09:24

## 2019-04-01 RX ADMIN — SODIUM CHLORIDE, POTASSIUM CHLORIDE, SODIUM LACTATE AND CALCIUM CHLORIDE: 600; 310; 30; 20 INJECTION, SOLUTION INTRAVENOUS at 07:09

## 2019-04-01 RX ADMIN — METRONIDAZOLE 500 MG: 500 INJECTION, SOLUTION INTRAVENOUS at 07:37

## 2019-04-01 RX ADMIN — ONDANSETRON 4 MG: 2 INJECTION, SOLUTION INTRAMUSCULAR; INTRAVENOUS at 10:17

## 2019-04-01 RX ADMIN — DEXTROSE MONOHYDRATE 5 MCG/KG/MIN: 5 INJECTION, SOLUTION INTRAVENOUS at 07:25

## 2019-04-01 RX ADMIN — Medication 2 MG: at 10:25

## 2019-04-01 RX ADMIN — LABETALOL 20 MG/4 ML (5 MG/ML) INTRAVENOUS SYRINGE 10 MG: at 12:50

## 2019-04-01 RX ADMIN — HYDROMORPHONE HYDROCHLORIDE 1 MG: 1 INJECTION, SOLUTION INTRAMUSCULAR; INTRAVENOUS; SUBCUTANEOUS at 14:35

## 2019-04-01 RX ADMIN — ROCURONIUM BROMIDE 10 MG: 10 INJECTION INTRAVENOUS at 08:44

## 2019-04-01 RX ADMIN — FENTANYL CITRATE 50 MCG: 50 INJECTION INTRAMUSCULAR; INTRAVENOUS at 13:15

## 2019-04-01 RX ADMIN — PHENYLEPHRINE HYDROCHLORIDE 100 MCG: 10 INJECTION INTRAVENOUS at 07:49

## 2019-04-01 RX ADMIN — FENTANYL CITRATE 50 MCG: 50 INJECTION INTRAMUSCULAR; INTRAVENOUS at 10:54

## 2019-04-01 RX ADMIN — ROCURONIUM BROMIDE 10 MG: 10 INJECTION INTRAVENOUS at 09:10

## 2019-04-01 RX ADMIN — HYDROMORPHONE HYDROCHLORIDE 1 MG: 1 INJECTION, SOLUTION INTRAMUSCULAR; INTRAVENOUS; SUBCUTANEOUS at 21:21

## 2019-04-01 RX ADMIN — PHENYLEPHRINE HYDROCHLORIDE 100 MCG: 10 INJECTION INTRAVENOUS at 08:48

## 2019-04-01 RX ADMIN — ROCURONIUM BROMIDE 10 MG: 10 INJECTION INTRAVENOUS at 09:57

## 2019-04-01 RX ADMIN — LIDOCAINE HYDROCHLORIDE 100 MG: 10 INJECTION, SOLUTION EPIDURAL; INFILTRATION; INTRACAUDAL; PERINEURAL at 07:24

## 2019-04-01 RX ADMIN — PROPOFOL 200 MG: 10 INJECTION, EMULSION INTRAVENOUS at 07:15

## 2019-04-01 RX ADMIN — FENTANYL CITRATE 25 MCG: 50 INJECTION, SOLUTION INTRAMUSCULAR; INTRAVENOUS at 11:27

## 2019-04-01 RX ADMIN — ESMOLOL HYDROCHLORIDE 10 MG: 10 INJECTION, SOLUTION INTRAVENOUS at 09:37

## 2019-04-01 RX ADMIN — ESMOLOL HYDROCHLORIDE 10 MG: 10 INJECTION, SOLUTION INTRAVENOUS at 10:00

## 2019-04-01 RX ADMIN — IPRATROPIUM BROMIDE AND ALBUTEROL SULFATE 1 AMPULE: .5; 3 SOLUTION RESPIRATORY (INHALATION) at 16:48

## 2019-04-01 RX ADMIN — LIDOCAINE HYDROCHLORIDE 50 MG: 10 INJECTION, SOLUTION EPIDURAL; INFILTRATION; INTRACAUDAL; PERINEURAL at 07:14

## 2019-04-01 RX ADMIN — ALBUTEROL SULFATE 6 PUFF: 90 AEROSOL, METERED RESPIRATORY (INHALATION) at 07:23

## 2019-04-01 RX ADMIN — PHENYLEPHRINE HYDROCHLORIDE 100 MCG: 10 INJECTION INTRAVENOUS at 08:30

## 2019-04-01 RX ADMIN — SUGAMMADEX 200 MG: 100 INJECTION, SOLUTION INTRAVENOUS at 10:46

## 2019-04-01 RX ADMIN — FENTANYL CITRATE 50 MCG: 50 INJECTION INTRAMUSCULAR; INTRAVENOUS at 10:39

## 2019-04-01 RX ADMIN — KETAMINE HYDROCHLORIDE 57 MG: 10 INJECTION INTRAMUSCULAR; INTRAVENOUS at 07:22

## 2019-04-01 RX ADMIN — ONDANSETRON 4 MG: 2 INJECTION INTRAMUSCULAR; INTRAVENOUS at 21:21

## 2019-04-01 RX ADMIN — PHENYLEPHRINE HYDROCHLORIDE 200 MCG: 10 INJECTION INTRAVENOUS at 09:12

## 2019-04-01 RX ADMIN — DEXAMETHASONE SODIUM PHOSPHATE 10 MG: 10 INJECTION INTRAMUSCULAR; INTRAVENOUS at 07:31

## 2019-04-01 RX ADMIN — Medication 50 MG: at 07:19

## 2019-04-01 RX ADMIN — SODIUM CHLORIDE, POTASSIUM CHLORIDE, SODIUM LACTATE AND CALCIUM CHLORIDE 1000 ML: 600; 310; 30; 20 INJECTION, SOLUTION INTRAVENOUS at 06:48

## 2019-04-01 RX ADMIN — ALBUTEROL SULFATE 10 PUFF: 90 AEROSOL, METERED RESPIRATORY (INHALATION) at 10:51

## 2019-04-01 RX ADMIN — SODIUM CHLORIDE, POTASSIUM CHLORIDE, SODIUM LACTATE AND CALCIUM CHLORIDE: 600; 310; 30; 20 INJECTION, SOLUTION INTRAVENOUS at 15:05

## 2019-04-01 RX ADMIN — ESMOLOL HYDROCHLORIDE 10 MG: 10 INJECTION, SOLUTION INTRAVENOUS at 07:39

## 2019-04-01 RX ADMIN — HYDROMORPHONE HYDROCHLORIDE 1 MG: 1 INJECTION, SOLUTION INTRAMUSCULAR; INTRAVENOUS; SUBCUTANEOUS at 17:51

## 2019-04-01 RX ADMIN — FENTANYL CITRATE 25 MCG: 50 INJECTION, SOLUTION INTRAMUSCULAR; INTRAVENOUS at 11:20

## 2019-04-01 RX ADMIN — HEPARIN SODIUM 5000 UNITS: 5000 INJECTION INTRAVENOUS; SUBCUTANEOUS at 06:50

## 2019-04-01 RX ADMIN — FENTANYL CITRATE 50 MCG: 50 INJECTION INTRAMUSCULAR; INTRAVENOUS at 12:05

## 2019-04-01 RX ADMIN — ROCURONIUM BROMIDE 50 MG: 10 INJECTION INTRAVENOUS at 07:15

## 2019-04-01 RX ADMIN — Medication 10 ML: at 21:21

## 2019-04-01 ASSESSMENT — PULMONARY FUNCTION TESTS
PIF_VALUE: 35
PIF_VALUE: 38
PIF_VALUE: 38
PIF_VALUE: 5
PIF_VALUE: 35
PIF_VALUE: 38
PIF_VALUE: 35
PIF_VALUE: 38
PIF_VALUE: 38
PIF_VALUE: 5
PIF_VALUE: 35
PIF_VALUE: 38
PIF_VALUE: 0
PIF_VALUE: 38
PIF_VALUE: 20
PIF_VALUE: 29
PIF_VALUE: 35
PIF_VALUE: 38
PIF_VALUE: 1
PIF_VALUE: 38
PIF_VALUE: 3
PIF_VALUE: 38
PIF_VALUE: 38
PIF_VALUE: 24
PIF_VALUE: 35
PIF_VALUE: 38
PIF_VALUE: 7
PIF_VALUE: 38
PIF_VALUE: 32
PIF_VALUE: 38
PIF_VALUE: 35
PIF_VALUE: 35
PIF_VALUE: 27
PIF_VALUE: 18
PIF_VALUE: 32
PIF_VALUE: 38
PIF_VALUE: 38
PIF_VALUE: 35
PIF_VALUE: 35
PIF_VALUE: 38
PIF_VALUE: 33
PIF_VALUE: 1
PIF_VALUE: 33
PIF_VALUE: 38
PIF_VALUE: 9
PIF_VALUE: 33
PIF_VALUE: 38
PIF_VALUE: 38
PIF_VALUE: 34
PIF_VALUE: 38
PIF_VALUE: 38
PIF_VALUE: 33
PIF_VALUE: 39
PIF_VALUE: 33
PIF_VALUE: 35
PIF_VALUE: 25
PIF_VALUE: 33
PIF_VALUE: 38
PIF_VALUE: 32
PIF_VALUE: 8
PIF_VALUE: 38
PIF_VALUE: 29
PIF_VALUE: 38
PIF_VALUE: 7
PIF_VALUE: 32
PIF_VALUE: 38
PIF_VALUE: 33
PIF_VALUE: 35
PIF_VALUE: 38
PIF_VALUE: 33
PIF_VALUE: 33
PIF_VALUE: 35
PIF_VALUE: 33
PIF_VALUE: 30
PIF_VALUE: 38
PIF_VALUE: 33
PIF_VALUE: 34
PIF_VALUE: 38
PIF_VALUE: 35
PIF_VALUE: 38
PIF_VALUE: 29
PIF_VALUE: 38
PIF_VALUE: 35
PIF_VALUE: 38
PIF_VALUE: 2
PIF_VALUE: 38
PIF_VALUE: 35
PIF_VALUE: 4
PIF_VALUE: 35
PIF_VALUE: 33
PIF_VALUE: 38
PIF_VALUE: 38
PIF_VALUE: 32
PIF_VALUE: 32
PIF_VALUE: 35
PIF_VALUE: 29
PIF_VALUE: 38
PIF_VALUE: 33
PIF_VALUE: 35
PIF_VALUE: 37
PIF_VALUE: 38
PIF_VALUE: 38
PIF_VALUE: 39
PIF_VALUE: 35
PIF_VALUE: 38
PIF_VALUE: 0
PIF_VALUE: 3
PIF_VALUE: 38
PIF_VALUE: 32
PIF_VALUE: 30
PIF_VALUE: 5
PIF_VALUE: 35
PIF_VALUE: 38
PIF_VALUE: 7
PIF_VALUE: 35
PIF_VALUE: 35
PIF_VALUE: 1
PIF_VALUE: 38
PIF_VALUE: 38
PIF_VALUE: 35
PIF_VALUE: 38
PIF_VALUE: 32
PIF_VALUE: 35
PIF_VALUE: 28
PIF_VALUE: 38
PIF_VALUE: 5
PIF_VALUE: 0
PIF_VALUE: 1
PIF_VALUE: 38
PIF_VALUE: 33
PIF_VALUE: 34
PIF_VALUE: 38
PIF_VALUE: 35
PIF_VALUE: 35
PIF_VALUE: 0
PIF_VALUE: 0
PIF_VALUE: 38
PIF_VALUE: 35
PIF_VALUE: 38
PIF_VALUE: 7
PIF_VALUE: 35
PIF_VALUE: 29
PIF_VALUE: 1
PIF_VALUE: 35
PIF_VALUE: 27
PIF_VALUE: 35
PIF_VALUE: 7
PIF_VALUE: 38
PIF_VALUE: 38
PIF_VALUE: 35
PIF_VALUE: 38
PIF_VALUE: 10
PIF_VALUE: 38
PIF_VALUE: 10
PIF_VALUE: 38
PIF_VALUE: 35
PIF_VALUE: 1
PIF_VALUE: 33
PIF_VALUE: 38
PIF_VALUE: 38
PIF_VALUE: 33
PIF_VALUE: 38
PIF_VALUE: 35
PIF_VALUE: 38
PIF_VALUE: 0
PIF_VALUE: 35
PIF_VALUE: 5
PIF_VALUE: 26
PIF_VALUE: 35
PIF_VALUE: 34
PIF_VALUE: 38
PIF_VALUE: 35
PIF_VALUE: 38
PIF_VALUE: 30
PIF_VALUE: 35
PIF_VALUE: 35
PIF_VALUE: 38
PIF_VALUE: 38
PIF_VALUE: 32
PIF_VALUE: 38
PIF_VALUE: 38
PIF_VALUE: 32
PIF_VALUE: 34
PIF_VALUE: 5
PIF_VALUE: 38
PIF_VALUE: 39
PIF_VALUE: 39
PIF_VALUE: 38
PIF_VALUE: 35
PIF_VALUE: 38
PIF_VALUE: 32
PIF_VALUE: 35
PIF_VALUE: 38

## 2019-04-01 ASSESSMENT — PAIN SCALES - GENERAL
PAINLEVEL_OUTOF10: 4
PAINLEVEL_OUTOF10: 7
PAINLEVEL_OUTOF10: 10
PAINLEVEL_OUTOF10: 8
PAINLEVEL_OUTOF10: 7
PAINLEVEL_OUTOF10: 9
PAINLEVEL_OUTOF10: 10
PAINLEVEL_OUTOF10: 2
PAINLEVEL_OUTOF10: 10
PAINLEVEL_OUTOF10: 3
PAINLEVEL_OUTOF10: 9
PAINLEVEL_OUTOF10: 9

## 2019-04-01 ASSESSMENT — PAIN DESCRIPTION - LOCATION
LOCATION: ABDOMEN
LOCATION: ABDOMEN

## 2019-04-01 ASSESSMENT — PAIN DESCRIPTION - PROGRESSION: CLINICAL_PROGRESSION: GRADUALLY WORSENING

## 2019-04-01 ASSESSMENT — PAIN DESCRIPTION - PAIN TYPE: TYPE: SURGICAL PAIN

## 2019-04-01 ASSESSMENT — PAIN DESCRIPTION - ONSET: ONSET: AWAKENED FROM SLEEP

## 2019-04-01 ASSESSMENT — PAIN DESCRIPTION - DESCRIPTORS: DESCRIPTORS: CRAMPING;BURNING

## 2019-04-01 ASSESSMENT — PAIN DESCRIPTION - ORIENTATION: ORIENTATION: MID;LOWER

## 2019-04-01 ASSESSMENT — LIFESTYLE VARIABLES: SMOKING_STATUS: 0

## 2019-04-01 NOTE — OP NOTE
4/1/2019     Procedure:  1.  Laparoscopic Sukhjinder-en-Y gastric bypass - Robotic Assisted  2.  Upper GI endoscopy     Preoperative diagnoses:  Obstructive Sleep Apnea  GERD  Morbid Obesity, BMI 51 kg/m2     Postoperative diagnoses:  Same     Anesthesia: General     Surgeon: Justin Morales     Assistant:  Gonzalo Palacios DO     Estimated blood loss:  17 cc     Preoperative medications: Heparin, SCD's, Ancef/Flagyl     Indications for procedure:     The patient is a pleasant 38 y. o. female with morbid obesity. Mason Lozoya have a BMI of Body mass index is 51 kg/m².  They've completed medical risk stratification and are scheduled for a Sukhjinder-en-Y gastric bypass.     Consent: The patient was seen and evaluated in the office setting where the procedure was explained to the patient and all questions were answered.  Discussion was held between Dr. yJoti Byrne and the patient. Deangelo Pham alternatives to the proposed procedure including but not limited to medical observation under the care of a physician exercise programs and other weight reductive operative procedures were explained to the patient.  Risks of the proposed procedure including but not limited to hemorrhage requiring transfusion, infection, nerve injury, conversion to open procedure, need for further surgery, anastomotic disruption or leak, anastomotic stricture, pneumonia, pulmonary embolus, airway complications and death were explained to the patient.  The patient understands the above alternatives and risks and has electively chosen laparoscopic Sukhjinder-en-Y gastric bypass and wishes to proceed with the procedure.     Description of procedure:   The patient was taken to the operating room and placed in supine position.  After successful induction of general endotracheal anesthesia by the anesthesia department a Gao catheter and orogastric tube was placed to decompress the bladder and stomach respectively.  The patient was placed in split leg lithotomy position and care was taken to pad the exposed extremities.  Then was prepped and draped in normal sterile fashion.  A 12 mm Optiview trocar was placed in the left sub-costal position in the midclavicular line and access to the abdominal cavity was obtained under visualization.  Pneumoperitoneum was then established without complications.  After evaluation of the abdominal contents from this trocar position 5 other ports were placed under direct visualization.  One at the umbilicus, one 2-3 cm above the umbilicus, one at the subxiphoid area, one in the right subcostal margin in the midclavicular line, and the last one further lateral to the original Optiview port.  A solution of 0.5% Marcaine was used to infiltrate the subcutaneous tissues prior to trocar placement.  The robot docked. Christiano Osier was no hiatal hernia.      After decompression of the stomach with the orogastric tube, the orogastric tube and any esophageal probes were removed from the patient.  This was reviewed with the anesthesia team.  The stomach was grasped using a forceps and retracted caudally to expose the phrenoesophageal ligament.  This was taken down with hook cautery.  A blunt palpation probe was used to expose the esophagus and left bundle of the right ryan.  At this point we released our retraction on the stomach and measured for 5-7 cm from the angle of His along the lesser curvature.  We made a window along the lesser curvature through the pars flaccida with the Vessel Sealer and began the dissection.  This allowed entry into the lesser sac.  Once the lesser sac was entered a 60 mm x 2.5 mm SHINE stapler was used to come across the neurovascular bundle.  A 60 mm x 3.5 mm stapler then passed across the stomach and fired.  60 mm × 3.5 mm staple loads were used to progress cephalad toward the angle of His.   A 30 mL gastric pouch was created.  After this was done the Orvil anvil for the EEA stapler was placed transorally.  This anvil was attached to an [address] [place] [address] orogastric tube and was placed by anesthesia without complications.  The orogastric tube was seen in the gastric pouch.  The scissors was used to make a gastrotomy in the gastric pouch and the orogastric tube was pulled out of the gastric pouch.  At this point the orogastric tube was detached from the anvil and removed, leaving the anvil in the gastric pouch.     Having successfully completed our small gastric pouch and anvil placement, we turned our attention to dividing the greater omentum.  This was accomplished with the Vessel Sealer.  After this we identified the ligament of Treitz and the inferior mesenteric vein, and went 50 cm distal.  A 3-0 silk suture was then placed to bernie the proximal bowel and then we divided the bowel using a 60 mm × 2.5 mm SHINE stapler.  The mesentery was also transected using a Vessel Sealer.     After this was done we brought the efferent limb all the way up to the gastric pouch in between our previously created defect in the greater omentum.  The staple line and the jejunum was opened using cautery and after enlarging the lateral trocar site the 25 mm EEA stapler was introduced into the abdomen and into the open jejunum.  Using the EEA stapler we performed our gastrojejunostomy.  The stapler was removed and 2 complete donuts were identified.  The open ended jejunum was closed using a 60 mm x 2.5 mm SHINE stapler and bowel continuity reestablished.  This amputated piece of jejunum was placed in an Endopouch and removed from the abdomen.  Of note the mesentery was thick and heavy.     We then measured distal on the small bowel and oriented our bowel 125 cm to create the jejunojejunostomy.   I could not go further as the mesentery became very heavy.  An enterotomy was created in each limb with the Harmonic scalpel and using the triple stapling technique created a side-to-side jejunojejunostomy.  60 mm x 2.5 mm staple loads were utilized with 45 mm of the staple load.  The opening was evaluated for hemostasis and care was taken to make sure the posterior wall of the anastomosis was free.  This was directly visualized under laparoscopic visualization.  The remaining free defect was closed with one or more firings of a 60 mm x 2.5 mm linear stapler.  Numerous sutures of 3-0 Silk sutures were used to close the defect of the mesentery at the jejunojejunostomy.  The piece of small bowel from the triple staple technique was then withdrawn from the abdomen.  The staple line was noted to be intact and to have captured serosa the entire length of the anastomosis. About 3 to 4 cm distal to the anastomosis I noticed a tear in the serosa of the common channel, I could not tell if this was a full enterotomy, however, it appeared to be inherent from the needed traction to lift the heavy mesentery and bowel to form the anastomosis. I did feel this needed repair. I repaired the site with multiple 3-0 vicryl sutures. I then placed the omentum over the region for a patch. Proper re-approximation of the tissues noted with hemostasis.   No stenosis of this region.     Having restored bowel continuity we placed multiple interrupted sutures to reinforce the gastrojejunal anastomosis.  Interrupted 3-0 Vicryl sutures were placed laterally, posterior laterally and anteriorly so that we could circumferentially reinforce the anastomosis.  The bowel was pink and viable, and there was no tension on the anastomosis.  Having completed our reinforced sutures we then proceeded with checking the anastomosis for leakage.     After completing the gastrojejunostomy, upper GI endoscopy was performed in order to evaluate the integrity of the anastomosis.  The Olympus gastroscope was introduced through the mouth, through the esophagus and into the small gastric pouch.  The anastomosis was noted to be patent widely.  The lateral gastric staple line and gastrojejunal anastomosis were hemostatic. The scope passed through the anastomosis into the jejunum into both limbs without obstruction.  The anastomosis was submerged under irrigation placed in the abdomen.  There was no evidence of air extravasation on the intraoperative operative leak test performed.  The air was evacuated and the scope removed from the patient.  A 19 perforated Chay drain was left posterior to the anastomosis and brought out through the right upper quadrant incision.  This was secured with a 2-0 silk suture. I also placed a drain out of the lateral left robotic port and over the jejunojejunostomy. The drain secured with a 2-0 silk suture to the skin.     The abdomen was then copiously irrigated and checked for hemostasis.  The effluent was evacuated from the abdomen and then we then turned our attention to closure of the trocar sites.  At this point prior to evacuation of the pneumoperitoneum we closed all our 12 mm ports with laparoscopic suture fascial closure device using 0 Vicryl.       The pneumoperitoneum was then evacuated.  The wounds were irrigated and found to be hemostatic.  The skin was closed using 4-0 Vicryl in a running subcuticular fashion.  Steri-Strips were applied to the wounds the patient was awakened from anesthesia extubated and taken to recovery in stable condition. The patient and her  updated to all operative findings.

## 2019-04-01 NOTE — PROGRESS NOTES
Smoking Cessation - topics covered   []  Health Risks  []  Benefits of Quitting   []  Smoking Cessation  [x]  Patient has no history of tobacco use  []  Patient is former smoker. [x]  No need for tobacco cessation education. []  Booklet given  []  Patient verbalizes understanding. []  Patient denies need for tobacco cessation education. []  Unable to meet with patient today. Will follow up as able.   Fallon Garcia  2:12 PM

## 2019-04-01 NOTE — PROGRESS NOTES
POST-OPERATIVE PROGRESS NOTE    Patient: Alecia Lamb    DATE: 4/1/2019    Surgery: RNY gastric bypass with EGD    Subjective:  Pt states that she is doing okay - feeling pain in her abdomen and somewhat controlled with current pain regimen. Increasing activity without difficulty - just urinated prior to examination. Denies nausea, vomiting, fevers, chills, SOB, chest pain. Objective:  Vital signs and Nurse's note reviewed  Post-op vital signs: stable    /84   Pulse 82   Temp 99.9 °F (37.7 °C) (Oral)   Resp 20   Ht 4' 11\" (1.499 m)   Wt 252 lb 6.8 oz (114.5 kg)   LMP 03/30/2019   SpO2 93%   BMI 50.98 kg/m²    Gen:  A&Ox3, NAD  CV: RRR, no m/h/t  Resp: LCTAB, no wheeze or rhonchi  Abd:  Soft, nttp, nd, wounds clean, dry and intact with dermabond; no erythema induration or exudate; ELIZABETH with serosanguinous fluid; abdominal binder in place  Ext:  Warm, no cyanosis or edema    Assessment:   POD # 0 S/P RNY gastric bypass with EGD  Recovering well post-op     Plan:    Continue current care  Pain control with dilaudid PRN  Continue NPO  F/u UGI in AM  Increase activity as tolerated.   Will reassess in AM    Electronically signed by Sarah Arias DO  on 4/1/2019 at 5:43 PM

## 2019-04-01 NOTE — ANESTHESIA PRE PROCEDURE
Department of Anesthesiology  Preprocedure Note       Name:  Isaak Munroe   Age:  41 y.o.  :  1980                                          MRN:  [id number]         Date:  2019      Surgeon: Russell Postal):  Patricia Montgomery DO    Procedure: XI ROBOTIC LAPAROSCOPIC GASTRIC BYPASS ZAID-EN-Y, LIVER BIOPSY, EGD- GI UNIT SCHEDULED (N/A )    Medications prior to admission:   Prior to Admission medications    Medication Sig Start Date End Date Taking? Authorizing Provider   topiramate (TOPAMAX) 25 MG tablet Take 50 mg by mouth 2 times daily   Yes Historical Provider, MD   pantoprazole (PROTONIX) 40 MG tablet Take 1 tablet by mouth daily 19  Yes Diana Padilla MD   gabapentin (NEURONTIN) 100 MG capsule Take 1 capsule by mouth 3 times daily for 90 days. . 19 Yes Diana Padilla MD   hydrochlorothiazide (MICROZIDE) 12.5 MG capsule Take 1 capsule by mouth daily 19  Yes Diana Padilla MD   fluticasone [facility]) 50 MCG/ACT nasal spray 1 spray by Each Nare route daily 1 Spray in each nostril 3/26/19   Lalo Blake MD   sodium chloride (ALTAMIST SPRAY) 0.65 % nasal spray 1 spray by Nasal route as needed for Congestion 3/26/19   Lalo Blake MD   vitamin D (ERGOCALCIFEROL) 61160 units CAPS capsule Take 1 capsule by mouth once a week for 12 doses 12/17/18 3/26/19  MIKO Heredia - CNP   ibuprofen (ADVIL;MOTRIN) 800 MG tablet Take 1 tablet by mouth every 8 hours as needed for Pain 11/26/18 3/26/19  Dean Perdomo DPM       Current medications:    Current Facility-Administered Medications   Medication Dose Route Frequency Provider Last Rate Last Dose    ceFAZolin (ANCEF) 2 g in dextrose 5 % 50 mL IVPB  2 g Intravenous Once Patricia Montgomery DO        metronidazole (FLAGYL) 500 mg in NaCl 100 mL IVPB premix  500 mg Intravenous Once Patricia Montgomery,         lactated ringers infusion   Intravenous Continuous Griffin Fernandez MD        sodium chloride flush 0.9 % injection 10 mL  10 mL Intravenous 2 times per day Ally Mata MD        sodium chloride flush 0.9 % injection 10 mL  10 mL Intravenous PRN Griffin Goff MD        lidocaine PF 1 % injection 1 mL  1 mL Intradermal Once PRN Griffin Goff MD        fentaNYL (SUBLIMAZE) injection 25 mcg  25 mcg Intravenous Q5 Min PRN Griffin Goff MD        midazolam (VERSED) injection 1 mg  1 mg Intravenous Q10 Min PRN Griffin Goff MD        lactated ringers infusion 1,000 mL  1,000 mL Intravenous Continuous Angeles Pena MD 50 mL/hr at 04/01/19 0648 1,000 mL at 04/01/19 0648       Allergies: Allergies   Allergen Reactions    Latex Hives and Itching       Problem List:    Patient Active Problem List   Diagnosis Code    Chronic migraine G43.709    Neuropathy G62.9    Morbid obesity with BMI of 50.0-59.9, adult (HCC) E66.01, Z68.43    Vitamin D deficiency E55.9    Antral gastritis K29.50    TINO (obstructive sleep apnea) G47.33       Past Medical History:        Diagnosis Date    Gastric reflux     Hypertension     Migraine     Neuropathy     Obesities, morbid ([facility] [address] 8/31/2018    TINO (obstructive sleep apnea) 12/17/2018    USES CPAP       Past Surgical History:        Procedure Laterality Date    TUBAL LIGATION      UPPER GASTROINTESTINAL ENDOSCOPY N/A 12/7/2018    EGD ESOPHAGOGASTRODUODENOSCOPY performed by Zeus Roper DO at [facility]    WISDOM TOOTH EXTRACTION         Social History:    Social History     Tobacco Use    Smoking status: Passive Smoke Exposure - Never Smoker    Smokeless tobacco: Never Used   Substance Use Topics    Alcohol use:  No                                Counseling given: Not Answered      Vital Signs (Current):   Vitals:    04/01/19 0615 04/01/19 0622 04/01/19 0626   BP:   118/70   Pulse:   85   Resp:   16   Temp:  99.1 °F (37.3 °C)    TempSrc:  Temporal    SpO2:   94%   Weight:  252 lb 6.8 oz (114.5 kg)    Height: 4' 11\" (1.499 m)                                                BP Readings from Last 3 Encounters:   04/01/19 118/70   03/27/19 130/74   03/26/19 127/88       NPO Status: Time of last liquid consumption: 0500                        Time of last solid consumption: 2000                        Date of last liquid consumption: 03/31/19                        Date of last solid food consumption: 03/30/19    BMI:   Wt Readings from Last 3 Encounters:   04/01/19 252 lb 6.8 oz (114.5 kg)   03/27/19 252 lb (114.3 kg)   03/26/19 255 lb 9.6 oz (115.9 kg)     Body mass index is 50.98 kg/m².     CBC:   Lab Results   Component Value Date    WBC 8.8 03/18/2019    RBC 4.64 03/18/2019    HGB 14.3 03/18/2019    HCT 42.8 03/18/2019    MCV 92.2 03/18/2019    RDW 13.2 03/18/2019     03/18/2019       CMP:   Lab Results   Component Value Date     03/18/2019    K 3.6 03/18/2019     03/18/2019    CO2 21 03/18/2019    BUN 10 03/18/2019    CREATININE 0.54 03/18/2019    GFRAA >60 03/18/2019    LABGLOM >60 03/18/2019    GLUCOSE 90 03/18/2019    PROT 6.7 03/11/2019    CALCIUM 9.1 03/18/2019    BILITOT 0.63 09/25/2018    ALKPHOS 59 09/25/2018    AST 13 09/25/2018    ALT 16 09/25/2018       POC Tests:   Recent Labs     04/01/19  0641   POCK 3.4*       Coags:   Lab Results   Component Value Date    PROTIME 10.3 03/18/2019    INR 1.0 03/18/2019    APTT 23.9 03/18/2019       HCG (If Applicable):   Lab Results   Component Value Date    HCG NEGATIVE 12/07/2018        ABGs: No results found for: PHART, PO2ART, RGQ1YFT, UCO7RNL, BEART, N7GARVUU     Type & Screen (If Applicable):  No results found for: LABABO, [address]    Anesthesia Evaluation  Patient summary reviewed no history of anesthetic complications:   Airway: Mallampati: III  TM distance: >3 FB   Neck ROM: full  Mouth opening: > = 3 FB Dental:          Pulmonary:normal exam    (+) recent URI:  sleep apnea: on CPAP,      (-) not a current smoker                           Cardiovascular:    (+) hypertension: no interval change,       ECG reviewed               Beta Blocker:  Not on Beta Blocker         Neuro/Psych:   (+) headaches:,             GI/Hepatic/Renal:   (+) GERD:,           Endo/Other: Negative Endo/Other ROS             Pt had PAT visit. Abdominal:           Vascular:                                        Anesthesia Plan      general     ASA 3       Induction: intravenous. MIPS: Postoperative opioids intended. Anesthetic plan and risks discussed with patient. Plan discussed with CRNA.                   Jack Don MD   4/1/2019

## 2019-04-01 NOTE — CARE COORDINATION
Case Management Initial Discharge Plan  Lucía Villasenor,             Met with:patient to discuss discharge plans. Information verified: address, contacts, phone number, , insurance Yes  PCP: Pamela Han MD  Date of last visit: last 2 weeks     Insurance Provider:     Discharge Planning    Living Arrangements:  Spouse/Significant Other, Children(lives with spouse and 3 children 16 16 and 15 )   Support Systems:  Spouse/Significant Other, Family Members, Kate Hightower has 2 stories  3 stairs to climb to get into front door, 14stairs to climb to reach second floor  Location of bedroom/bathroom in home main    Patient able to perform ADL's:Independent    Current Services (outpatient & in home) none  DME equipment: cpap  DME provider:     Pharmacy: meds to beds  Potential Assistance Purchasing Medications:  No  Does patient want to participate in local refill/ meds to beds program?  Yes    Potential Assistance Needed:  N/A    Patient agreeable to home care: No  [place] of choice provided:  n/a    Prior SNF/Rehab Placement and Facility: none  Agreeable to SNF/Rehab: No  [place] of choice provided: n/a   Evaluation: no    Expected Discharge date:  19  Patient expects to be discharged to:  home  Follow Up Appointment: Best Day/ Time: Monday AM    Transportation provider: [facility] [employer]  Transportation arrangements needed for discharge: Yes    Readmission Risk              Risk of Unplanned Readmission:        8             Does patient have a readmission risk score greater than 14?: No  If yes, follow-up appointment must be made within 7 days of discharge.      Discharge Plan: home with spouse she will call [facility] [employer] for transportation home        Electronically signed by Ming Palmer RN on 19 at 3:49 PM

## 2019-04-01 NOTE — ANESTHESIA POSTPROCEDURE EVALUATION
Department of Anesthesiology  Postprocedure Note    Patient: Silas Corrales  MRN: [id number]  YOB: 1980  Date of evaluation: 4/1/2019  Time:  11:48 AM     Procedure Summary     Date:  04/01/19 Room / Location:  [address] OR    Anesthesia Start:  1167 Anesthesia Stop:  1108    Procedure:  XI ROBOTIC LAPAROSCOPIC GASTRIC BYPASS ZAID-EN-Y, LIVER BIOPSY, EGD (N/A ) Diagnosis:  (MORBID OBESITY, OBSTRUCTIVE SLEEP APNEA, NEUROPATHY)    Surgeon:  Sabine Harris DO Responsible Provider:  Maximus Nazario MD    Anesthesia Type:  general ASA Status:  3          Anesthesia Type: general    Lydia Phase I: Lydia Score: 8    Lydia Phase II:      Last vitals: Reviewed and per EMR flowsheets.        Anesthesia Post Evaluation    Patient location during evaluation: PACU  Patient participation: complete - patient participated  Level of consciousness: awake and alert  Pain score: 2  Airway patency: patent  Nausea & Vomiting: no nausea and no vomiting  Complications: no  Cardiovascular status: hemodynamically stable  Respiratory status: acceptable, nasal cannula and room air  Hydration status: stable

## 2019-04-01 NOTE — PLAN OF CARE
Problem: Pain:  Goal: Pain level will decrease  Description  Pain level will decrease  Outcome: Ongoing  Goal: Control of acute pain  Description  Control of acute pain  Outcome: Ongoing  Goal: Control of chronic pain  Description  Control of chronic pain  Outcome: Ongoing     Problem: Discharge Planning:  Goal: Discharged to appropriate level of care  Description  Discharged to appropriate level of care  Outcome: Ongoing     Problem:  Bowel Function - Altered:  Goal: Bowel elimination is within specified parameters  Description  Bowel elimination is within specified parameters  Outcome: Ongoing     Problem: Fluid Volume - Imbalance:  Goal: Ability to achieve a balanced intake and output will improve  Description  Ability to achieve a balanced intake and output will improve  Outcome: Ongoing     Problem: Infection - Surgical Site:  Goal: Will show no infection signs and symptoms  Description  Will show no infection signs and symptoms  Outcome: Ongoing  Goal: Ability to maintain a body temperature in the normal range will improve  Description  Ability to maintain a body temperature in the normal range will improve  Outcome: Ongoing

## 2019-04-01 NOTE — INTERVAL H&P NOTE
Pt Name: Alecia Lamb  MRN: [id number]  [place]: 1980  Date of evaluation: 4/1/2019    I have reviewed the patient's history and physical examination completed in pre-admission testing on 3/18/19    No changes to history or on examination today, unless noted below. Reports slight sinus \"stuffiness\"- Saw her PCP for clearance last week. Clearance on file. No other changes. Heart RRR and lungs CTA bilaterally.     JOCE INGRAM CNP  4/1/19  6:39 AM

## 2019-04-02 ENCOUNTER — APPOINTMENT (OUTPATIENT)
Dept: GENERAL RADIOLOGY | Age: 39
DRG: 403 | End: 2019-04-02
Attending: SURGERY
Payer: MEDICARE

## 2019-04-02 LAB
ANION GAP SERPL CALCULATED.3IONS-SCNC: 14 MMOL/L (ref 9–17)
BUN BLDV-MCNC: 9 MG/DL (ref 6–20)
BUN/CREAT BLD: ABNORMAL (ref 9–20)
CALCIUM SERPL-MCNC: 8.9 MG/DL (ref 8.6–10.4)
CHLORIDE BLD-SCNC: 101 MMOL/L (ref 98–107)
CO2: 22 MMOL/L (ref 20–31)
CREAT SERPL-MCNC: 0.65 MG/DL (ref 0.5–0.9)
GFR AFRICAN AMERICAN: >60 ML/MIN
GFR NON-AFRICAN AMERICAN: >60 ML/MIN
GFR SERPL CREATININE-BSD FRML MDRD: ABNORMAL ML/MIN/{1.73_M2}
GFR SERPL CREATININE-BSD FRML MDRD: ABNORMAL ML/MIN/{1.73_M2}
GLUCOSE BLD-MCNC: 132 MG/DL (ref 70–99)
HCT VFR BLD CALC: 39.5 % (ref 36.3–47.1)
HEMOGLOBIN: 13.6 G/DL (ref 11.9–15.1)
MCH RBC QN AUTO: 31.3 PG (ref 25.2–33.5)
MCHC RBC AUTO-ENTMCNC: 34.4 G/DL (ref 28.4–34.8)
MCV RBC AUTO: 91 FL (ref 82.6–102.9)
NRBC AUTOMATED: 0 PER 100 WBC
PDW BLD-RTO: 13.9 % (ref 11.8–14.4)
PLATELET # BLD: NORMAL K/UL (ref 138–453)
PLATELET, FLUORESCENCE: 103 K/UL (ref 138–453)
PLATELET, IMMATURE FRACTION: 8.5 % (ref 1.1–10.3)
PMV BLD AUTO: NORMAL FL (ref 8.1–13.5)
POTASSIUM SERPL-SCNC: 3.4 MMOL/L (ref 3.7–5.3)
RBC # BLD: 4.34 M/UL (ref 3.95–5.11)
SODIUM BLD-SCNC: 137 MMOL/L (ref 135–144)
WBC # BLD: 9.5 K/UL (ref 3.5–11.3)

## 2019-04-02 PROCEDURE — 94760 N-INVAS EAR/PLS OXIMETRY 1: CPT

## 2019-04-02 PROCEDURE — 6360000004 HC RX CONTRAST MEDICATION: Performed by: SURGERY

## 2019-04-02 PROCEDURE — 94640 AIRWAY INHALATION TREATMENT: CPT

## 2019-04-02 PROCEDURE — 85055 RETICULATED PLATELET ASSAY: CPT

## 2019-04-02 PROCEDURE — 74220 X-RAY XM ESOPHAGUS 1CNTRST: CPT

## 2019-04-02 PROCEDURE — 85027 COMPLETE CBC AUTOMATED: CPT

## 2019-04-02 PROCEDURE — 6370000000 HC RX 637 (ALT 250 FOR IP): Performed by: SURGERY

## 2019-04-02 PROCEDURE — 80048 BASIC METABOLIC PNL TOTAL CA: CPT

## 2019-04-02 PROCEDURE — 2580000003 HC RX 258: Performed by: ANESTHESIOLOGY

## 2019-04-02 PROCEDURE — 2500000003 HC RX 250 WO HCPCS: Performed by: SURGERY

## 2019-04-02 PROCEDURE — 36415 COLL VENOUS BLD VENIPUNCTURE: CPT

## 2019-04-02 PROCEDURE — 94762 N-INVAS EAR/PLS OXIMTRY CONT: CPT

## 2019-04-02 PROCEDURE — 6360000002 HC RX W HCPCS: Performed by: SURGERY

## 2019-04-02 PROCEDURE — 74018 RADEX ABDOMEN 1 VIEW: CPT

## 2019-04-02 PROCEDURE — 2580000003 HC RX 258: Performed by: SURGERY

## 2019-04-02 PROCEDURE — 1200000000 HC SEMI PRIVATE

## 2019-04-02 RX ORDER — PROMETHAZINE HYDROCHLORIDE 25 MG/1
25 TABLET ORAL EVERY 6 HOURS PRN
Status: DISCONTINUED | OUTPATIENT
Start: 2019-04-02 | End: 2019-04-03 | Stop reason: HOSPADM

## 2019-04-02 RX ORDER — OXYCODONE HYDROCHLORIDE AND ACETAMINOPHEN 5; 325 MG/1; MG/1
2 TABLET ORAL EVERY 4 HOURS PRN
Status: DISCONTINUED | OUTPATIENT
Start: 2019-04-02 | End: 2019-04-03 | Stop reason: HOSPADM

## 2019-04-02 RX ADMIN — IPRATROPIUM BROMIDE AND ALBUTEROL SULFATE 1 AMPULE: .5; 3 SOLUTION RESPIRATORY (INHALATION) at 16:32

## 2019-04-02 RX ADMIN — HYDROMORPHONE HYDROCHLORIDE 1 MG: 1 INJECTION, SOLUTION INTRAMUSCULAR; INTRAVENOUS; SUBCUTANEOUS at 20:25

## 2019-04-02 RX ADMIN — HYDROMORPHONE HYDROCHLORIDE 1 MG: 1 INJECTION, SOLUTION INTRAMUSCULAR; INTRAVENOUS; SUBCUTANEOUS at 13:25

## 2019-04-02 RX ADMIN — HYDROMORPHONE HYDROCHLORIDE 1 MG: 1 INJECTION, SOLUTION INTRAMUSCULAR; INTRAVENOUS; SUBCUTANEOUS at 00:35

## 2019-04-02 RX ADMIN — FAMOTIDINE 20 MG: 10 INJECTION, SOLUTION INTRAVENOUS at 08:17

## 2019-04-02 RX ADMIN — HYDROMORPHONE HYDROCHLORIDE 1 MG: 1 INJECTION, SOLUTION INTRAMUSCULAR; INTRAVENOUS; SUBCUTANEOUS at 16:22

## 2019-04-02 RX ADMIN — METRONIDAZOLE 500 MG: 500 INJECTION, SOLUTION INTRAVENOUS at 01:21

## 2019-04-02 RX ADMIN — SODIUM CHLORIDE, POTASSIUM CHLORIDE, SODIUM LACTATE AND CALCIUM CHLORIDE: 600; 310; 30; 20 INJECTION, SOLUTION INTRAVENOUS at 16:21

## 2019-04-02 RX ADMIN — HYDROMORPHONE HYDROCHLORIDE 1 MG: 1 INJECTION, SOLUTION INTRAMUSCULAR; INTRAVENOUS; SUBCUTANEOUS at 03:38

## 2019-04-02 RX ADMIN — Medication 10 ML: at 09:12

## 2019-04-02 RX ADMIN — IPRATROPIUM BROMIDE AND ALBUTEROL SULFATE 1 AMPULE: .5; 3 SOLUTION RESPIRATORY (INHALATION) at 08:37

## 2019-04-02 RX ADMIN — IOHEXOL 30 ML: 240 INJECTION, SOLUTION INTRATHECAL; INTRAVASCULAR; INTRAVENOUS; ORAL at 09:50

## 2019-04-02 RX ADMIN — HEPARIN SODIUM 5000 UNITS: 5000 INJECTION INTRAVENOUS; SUBCUTANEOUS at 06:23

## 2019-04-02 RX ADMIN — HEPARIN SODIUM 5000 UNITS: 5000 INJECTION INTRAVENOUS; SUBCUTANEOUS at 20:25

## 2019-04-02 RX ADMIN — HEPARIN SODIUM 5000 UNITS: 5000 INJECTION INTRAVENOUS; SUBCUTANEOUS at 13:26

## 2019-04-02 RX ADMIN — ONDANSETRON 4 MG: 2 INJECTION INTRAMUSCULAR; INTRAVENOUS at 06:40

## 2019-04-02 RX ADMIN — ONDANSETRON 4 MG: 2 INJECTION INTRAMUSCULAR; INTRAVENOUS at 13:01

## 2019-04-02 RX ADMIN — FAMOTIDINE 20 MG: 10 INJECTION, SOLUTION INTRAVENOUS at 20:25

## 2019-04-02 RX ADMIN — Medication 10 ML: at 20:25

## 2019-04-02 RX ADMIN — Medication 2 G: at 00:35

## 2019-04-02 RX ADMIN — HYDROMORPHONE HYDROCHLORIDE 1 MG: 1 INJECTION, SOLUTION INTRAMUSCULAR; INTRAVENOUS; SUBCUTANEOUS at 10:13

## 2019-04-02 RX ADMIN — PROMETHAZINE HYDROCHLORIDE 12.5 MG: 25 INJECTION INTRAMUSCULAR; INTRAVENOUS at 09:08

## 2019-04-02 RX ADMIN — HYDROMORPHONE HYDROCHLORIDE 1 MG: 1 INJECTION, SOLUTION INTRAMUSCULAR; INTRAVENOUS; SUBCUTANEOUS at 23:50

## 2019-04-02 RX ADMIN — SODIUM CHLORIDE, POTASSIUM CHLORIDE, SODIUM LACTATE AND CALCIUM CHLORIDE: 600; 310; 30; 20 INJECTION, SOLUTION INTRAVENOUS at 09:11

## 2019-04-02 RX ADMIN — PROMETHAZINE HYDROCHLORIDE 12.5 MG: 25 INJECTION INTRAMUSCULAR; INTRAVENOUS at 16:22

## 2019-04-02 RX ADMIN — HYDROMORPHONE HYDROCHLORIDE 1 MG: 1 INJECTION, SOLUTION INTRAMUSCULAR; INTRAVENOUS; SUBCUTANEOUS at 06:40

## 2019-04-02 RX ADMIN — IPRATROPIUM BROMIDE AND ALBUTEROL SULFATE 1 AMPULE: .5; 3 SOLUTION RESPIRATORY (INHALATION) at 20:15

## 2019-04-02 ASSESSMENT — PAIN SCALES - GENERAL
PAINLEVEL_OUTOF10: 4
PAINLEVEL_OUTOF10: 7
PAINLEVEL_OUTOF10: 7
PAINLEVEL_OUTOF10: 5
PAINLEVEL_OUTOF10: 6
PAINLEVEL_OUTOF10: 7
PAINLEVEL_OUTOF10: 3
PAINLEVEL_OUTOF10: 6
PAINLEVEL_OUTOF10: 7
PAINLEVEL_OUTOF10: 6
PAINLEVEL_OUTOF10: 4
PAINLEVEL_OUTOF10: 3
PAINLEVEL_OUTOF10: 6
PAINLEVEL_OUTOF10: 7
PAINLEVEL_OUTOF10: 5
PAINLEVEL_OUTOF10: 7
PAINLEVEL_OUTOF10: 3

## 2019-04-02 ASSESSMENT — PAIN DESCRIPTION - DESCRIPTORS
DESCRIPTORS: CRAMPING;BURNING

## 2019-04-02 ASSESSMENT — PAIN DESCRIPTION - PROGRESSION
CLINICAL_PROGRESSION: GRADUALLY WORSENING
CLINICAL_PROGRESSION: GRADUALLY IMPROVING
CLINICAL_PROGRESSION: GRADUALLY WORSENING

## 2019-04-02 ASSESSMENT — PAIN DESCRIPTION - ORIENTATION
ORIENTATION: MID;LOWER

## 2019-04-02 ASSESSMENT — PAIN DESCRIPTION - FREQUENCY
FREQUENCY: CONTINUOUS

## 2019-04-02 ASSESSMENT — PAIN DESCRIPTION - PAIN TYPE
TYPE: SURGICAL PAIN

## 2019-04-02 ASSESSMENT — PAIN DESCRIPTION - ONSET
ONSET: GRADUAL

## 2019-04-02 ASSESSMENT — PAIN DESCRIPTION - LOCATION
LOCATION: ABDOMEN

## 2019-04-02 NOTE — PLAN OF CARE
Problem: Pain:  Goal: Pain level will decrease  Description  Pain level will decrease  4/2/2019 0353 by Liz Kumari RN  Outcome: Ongoing  4/1/2019 1804 by Em Becerra RN  Outcome: Ongoing  Goal: Control of acute pain  Description  Control of acute pain  4/2/2019 0353 by Liz Kumari RN  Outcome: Ongoing  4/1/2019 1804 by Em Becerra RN  Outcome: Ongoing  Goal: Control of chronic pain  Description  Control of chronic pain  4/2/2019 0353 by Liz Kumari RN  Outcome: Ongoing  4/1/2019 1804 by Em Becerra RN  Outcome: Ongoing     Problem: Discharge Planning:  Goal: Discharged to appropriate level of care  Description  Discharged to appropriate level of care  4/2/2019 0353 by Liz Kumari RN  Outcome: Ongoing  4/1/2019 1804 by Em Becerra RN  Outcome: Ongoing     Problem:  Bowel Function - Altered:  Goal: Bowel elimination is within specified parameters  Description  Bowel elimination is within specified parameters  4/2/2019 0353 by Liz Kumari RN  Outcome: Ongoing  4/1/2019 1804 by Em Becerra RN  Outcome: Ongoing     Problem: Fluid Volume - Imbalance:  Goal: Ability to achieve a balanced intake and output will improve  Description  Ability to achieve a balanced intake and output will improve  4/2/2019 0353 by Liz Kumari RN  Outcome: Ongoing  4/1/2019 1804 by Em Becerra RN  Outcome: Ongoing     Problem: Infection - Surgical Site:  Goal: Will show no infection signs and symptoms  Description  Will show no infection signs and symptoms  4/2/2019 0353 by Liz Kumari RN  Outcome: Ongoing  4/1/2019 1804 by Em Becerra RN  Outcome: Ongoing  Goal: Ability to maintain a body temperature in the normal range will improve  Description  Ability to maintain a body temperature in the normal range will improve  4/2/2019 0353 by Liz Kumari RN  Outcome: Ongoing  4/1/2019 1804 by Em Becerra RN  Outcome: Ongoing

## 2019-04-02 NOTE — PROGRESS NOTES
Surgery Progress Note            PATIENT NAME: Angelito Diaz     TODAY'S DATE: 4/2/2019, 6:46 AM      SUBJECTIVE:    Pt seen and examined at bedside. Tolerable pain.  NPO. Ambulating. Denies N/V/F/C. Voiding without difficulty. No other complaints noted. OBJECTIVE:   VITALS:  BP (!) 160/90   Pulse 72   Temp 99 °F (37.2 °C) (Oral)   Resp 16   Ht 4' 11\" (1.499 m)   Wt 252 lb 6.8 oz (114.5 kg)   LMP 03/30/2019   SpO2 98%   BMI 50.98 kg/m²      INTAKE/OUTPUT:      Intake/Output Summary (Last 24 hours) at 4/2/2019 0646  Last data filed at 4/1/2019 1836  Gross per 24 hour   Intake 1967 ml   Output 1320 ml   Net 647 ml                     CONSTITUTIONAL:  NAD, A&O x 3  LUNGS:  CTA b/l  CARDIOVASCULAR:  S1S2  ABDOMEN: Soft, nttp, nd, wounds clean, dry and intact with dermabond; no erythema induration or exudate; ELIZABETH with serosanguinous fluid; abdominal binder in place  EXTREMITIES:  Warm, no cyanosis or edema      Pending UGI    ASSESSMENT   Patient Active Problem List   Diagnosis    Chronic migraine    Neuropathy    Morbid obesity with BMI of 50.0-59.9, adult (HCC)    Vitamin D deficiency    Antral gastritis    TINO (obstructive sleep apnea)    Status post gastric bypass for obesity       41 y.o. F POD#1 s/p robotic assisted Sukhjinder-en-Y gastric bypass, EGD 2/2 morbid obesity      Plan  1. Continue NPO. Esophagogram today. If neg, will start CLD. 2. IVF at 125 ml/hr with 0.9% NS with 20 meq KCl. 3. Pain control with Dilaudid PRN. 4. Nausea control with Zofran and Phenergan PRN  5. Encourage ambulation and IS usages. 6. DVT prophylaxis with Heparin TID. 7. GI prophylaxis with Pepcid.     Electronically signed by Prema Hill DO  on 4/2/2019 at 6:46 AM

## 2019-04-03 ENCOUNTER — TELEPHONE (OUTPATIENT)
Dept: BARIATRICS/WEIGHT MGMT | Age: 39
End: 2019-04-03

## 2019-04-03 VITALS
SYSTOLIC BLOOD PRESSURE: 149 MMHG | RESPIRATION RATE: 16 BRPM | HEIGHT: 59 IN | DIASTOLIC BLOOD PRESSURE: 95 MMHG | BODY MASS INDEX: 50.89 KG/M2 | WEIGHT: 252.43 LBS | OXYGEN SATURATION: 99 % | HEART RATE: 87 BPM | TEMPERATURE: 98 F

## 2019-04-03 LAB
ANION GAP SERPL CALCULATED.3IONS-SCNC: 12 MMOL/L (ref 9–17)
BUN BLDV-MCNC: 9 MG/DL (ref 6–20)
BUN/CREAT BLD: ABNORMAL (ref 9–20)
CALCIUM SERPL-MCNC: 8.3 MG/DL (ref 8.6–10.4)
CHLORIDE BLD-SCNC: 102 MMOL/L (ref 98–107)
CO2: 23 MMOL/L (ref 20–31)
CREAT SERPL-MCNC: 0.53 MG/DL (ref 0.5–0.9)
GFR AFRICAN AMERICAN: >60 ML/MIN
GFR NON-AFRICAN AMERICAN: >60 ML/MIN
GFR SERPL CREATININE-BSD FRML MDRD: ABNORMAL ML/MIN/{1.73_M2}
GFR SERPL CREATININE-BSD FRML MDRD: ABNORMAL ML/MIN/{1.73_M2}
GLUCOSE BLD-MCNC: 107 MG/DL (ref 70–99)
HCT VFR BLD CALC: 36 % (ref 36.3–47.1)
HEMOGLOBIN: 11.5 G/DL (ref 11.9–15.1)
MCH RBC QN AUTO: 30.7 PG (ref 25.2–33.5)
MCHC RBC AUTO-ENTMCNC: 31.9 G/DL (ref 28.4–34.8)
MCV RBC AUTO: 96.3 FL (ref 82.6–102.9)
NRBC AUTOMATED: 0 PER 100 WBC
PDW BLD-RTO: 14.2 % (ref 11.8–14.4)
PLATELET # BLD: 104 K/UL (ref 138–453)
PMV BLD AUTO: 12.4 FL (ref 8.1–13.5)
POTASSIUM SERPL-SCNC: 3.7 MMOL/L (ref 3.7–5.3)
RBC # BLD: 3.74 M/UL (ref 3.95–5.11)
SODIUM BLD-SCNC: 137 MMOL/L (ref 135–144)
WBC # BLD: 8 K/UL (ref 3.5–11.3)

## 2019-04-03 PROCEDURE — 85027 COMPLETE CBC AUTOMATED: CPT

## 2019-04-03 PROCEDURE — 6360000002 HC RX W HCPCS: Performed by: SURGERY

## 2019-04-03 PROCEDURE — 94640 AIRWAY INHALATION TREATMENT: CPT

## 2019-04-03 PROCEDURE — 6370000000 HC RX 637 (ALT 250 FOR IP): Performed by: SURGERY

## 2019-04-03 PROCEDURE — 2500000003 HC RX 250 WO HCPCS: Performed by: SURGERY

## 2019-04-03 PROCEDURE — 36415 COLL VENOUS BLD VENIPUNCTURE: CPT

## 2019-04-03 PROCEDURE — 80048 BASIC METABOLIC PNL TOTAL CA: CPT

## 2019-04-03 RX ADMIN — GABAPENTIN 300 MG: 600 TABLET ORAL at 08:43

## 2019-04-03 RX ADMIN — HEPARIN SODIUM 5000 UNITS: 5000 INJECTION INTRAVENOUS; SUBCUTANEOUS at 06:25

## 2019-04-03 RX ADMIN — OXYCODONE HYDROCHLORIDE AND ACETAMINOPHEN 2 TABLET: 5; 325 TABLET ORAL at 12:46

## 2019-04-03 RX ADMIN — OXYCODONE HYDROCHLORIDE AND ACETAMINOPHEN 2 TABLET: 5; 325 TABLET ORAL at 04:16

## 2019-04-03 RX ADMIN — IPRATROPIUM BROMIDE AND ALBUTEROL SULFATE 1 AMPULE: .5; 3 SOLUTION RESPIRATORY (INHALATION) at 08:54

## 2019-04-03 RX ADMIN — OXYCODONE HYDROCHLORIDE AND ACETAMINOPHEN 2 TABLET: 5; 325 TABLET ORAL at 08:41

## 2019-04-03 RX ADMIN — FAMOTIDINE 20 MG: 10 INJECTION, SOLUTION INTRAVENOUS at 08:41

## 2019-04-03 ASSESSMENT — PAIN DESCRIPTION - PROGRESSION
CLINICAL_PROGRESSION: GRADUALLY WORSENING

## 2019-04-03 ASSESSMENT — PAIN SCALES - GENERAL
PAINLEVEL_OUTOF10: 7
PAINLEVEL_OUTOF10: 3
PAINLEVEL_OUTOF10: 6
PAINLEVEL_OUTOF10: 3

## 2019-04-03 NOTE — DISCHARGE SUMMARY
Physician Discharge Summary     Patient ID:  Nicola Primrose  [id number]  7 y.o.  1980    Admit date: 4/1/2019    Discharge date and time: 4/3/2019    Admitting Physician: Donzetta Nyhan, DO     Discharge Physician: Dr. Rene Chew    Admission Diagnoses: Status post gastric bypass for obesity [Z98.84]    Discharge Diagnoses:   41 y.o. F POD#2 s/p robotic assisted Sukhjinder-en-Y gastric bypass, EGD 2/2 morbid obesity        Admission Condition: stable    Discharged Condition: stable    Indication for Admission: Morbid Obesity    Hospital Course: This is 41 y.o. Female who underwent robotic assisted laparoscopic Sukhjinder-En-Y gastric bypass, EGD on 4/1/2019 without any complications. Patient had esophagogram on POD#1, which was negative. Patient was started on bar CLD. Pain was well controlled. Ambulating without any difficulty. Patient is to be d/cd home in stable condition on 4/3/2019. Consults: none    Discharge Exam:  CONSTITUTIONAL:  NAD, A&O x 3  LUNGS:  CTA b/l  CARDIOVASCULAR:  S1S2  ABDOMEN: Soft, nondistended, no TTP, dried dressing intact over port sites. ELIZABETH drain intact with SS drainage    Disposition: home    Patient Instructions:      Medication List      START taking these medications    enoxaparin 40 MG/0.4ML injection  Commonly known as:  LOVENOX  Inject 0.4 mLs into the skin 2 times daily for 14 days     oxyCODONE-acetaminophen 5-325 MG per tablet  Commonly known as:  PERCOCET  Take 1 tablet by mouth every 6 hours as needed for Pain for up to 7 days. Intended supply: 7 days.  Take lowest dose possible to manage pain     promethazine 12.5 MG tablet  Commonly known as:  PHENERGAN  Take 1 tablet by mouth every 6 hours as needed for Nausea        CONTINUE taking these medications    fluticasone 50 MCG/ACT nasal spray  Commonly known as:  FLONASE  1 spray by Each Nare route daily 1 Spray in each nostril     gabapentin 100 MG capsule  Commonly known as:  NEURONTIN  Take 1 capsule by mouth 3 times daily for 90 days. .     hydrochlorothiazide 12.5 MG capsule  Commonly known as:  MICROZIDE  Take 1 capsule by mouth daily     pantoprazole 40 MG tablet  Commonly known as:  PROTONIX  Take 1 tablet by mouth daily     sodium chloride 0.65 % nasal spray  Commonly known as:  ALTAMIST SPRAY  1 spray by Nasal route as needed for Congestion     topiramate 25 MG tablet  Commonly known as:  TOPAMAX        STOP taking these medications    ibuprofen 800 MG tablet  Commonly known as:  ADVIL;MOTRIN     vitamin D 07572 units Caps capsule  Commonly known as:  ERGOCALCIFEROL           Where to Get Your Medications      You can get these medications from any pharmacy    Bring a paper prescription for each of these medications  · enoxaparin 40 MG/0.4ML injection  · oxyCODONE-acetaminophen 5-325 MG per tablet  · promethazine 12.5 MG tablet         Activity: no lifting, or Strenuous exercise for 2 weeks. Diet: clear liquids  Wound Care: keep wound clean and dry    Follow-up with Dr. Roxanna Washington in 1 week.     Drake Caballero  4/3/2019  6:55 AM

## 2019-04-03 NOTE — PROGRESS NOTES
CLINICAL PHARMACY NOTE: MEDS TO [address] Select Patient?: Yes  Total # of Prescriptions Filled: 3   The following medications were delivered to the patient:  · PERCOCET   · LOVENOX 40  · PROMETHAZINE   Total # of Interventions Completed: 0  Time Spent (min): 0    Additional Documentation:

## 2019-04-03 NOTE — PLAN OF CARE
Problem: RESPIRATORY  Intervention: Respiratory assessment  Note:   BRONCHOSPASM/BRONCHOCONSTRICTION             IMPROVE AERATION/BREATH SOUNDS   ADMINISTER BRONCHODILATOR THERAPY AS APPROPRIATE    ASSESS BREATH SOUNDS     PATIENT EDUCATION AS NEEDED

## 2019-04-03 NOTE — PROGRESS NOTES
Surgery Progress Note            PATIENT NAME: Maryann Jalloh     TODAY'S DATE: 4/3/2019, 6:28 AM      SUBJECTIVE:    Pt seen and examined at bedside. Tolerable pain. Tolerating bar cld. Ambulating. Denies N/V/F/C. Voiding without difficulty. No other complaints noted. OBJECTIVE:   VITALS:  /80   Pulse 97   Temp 98.3 °F (36.8 °C) (Oral)   Resp 16   Ht 4' 11\" (1.499 m)   Wt 252 lb 6.8 oz (114.5 kg)   LMP 03/30/2019   SpO2 98%   BMI 50.98 kg/m²      INTAKE/OUTPUT:      Intake/Output Summary (Last 24 hours) at 4/3/2019 8370  Last data filed at 4/3/2019 5954  Gross per 24 hour   Intake 1800 ml   Output 2052.5 ml   Net -252.5 ml                     CONSTITUTIONAL:  NAD, A&O x 3  LUNGS:  CTA b/l  CARDIOVASCULAR:  S1S2  ABDOMEN: Soft, nttp, nd, wounds clean, dry and intact with dermabond; no erythema induration or exudate; ELIZABETH with serosanguinous fluid; abdominal binder in place  EXTREMITIES:  Warm, no cyanosis or edema      Pending UGI    ASSESSMENT   Patient Active Problem List   Diagnosis    Chronic migraine    Neuropathy    Morbid obesity with BMI of 50.0-59.9, adult (HCC)    Vitamin D deficiency    Antral gastritis    TINO (obstructive sleep apnea)    Status post gastric bypass for obesity       41 y.o. F POD#2 s/p robotic assisted Sukhjinder-en-Y gastric bypass, EGD 2/2 morbid obesity      Plan  1. Bar CLD. 2. IVF at 125 ml/hr with LR  3. Pain control with Dilaudid PRN. 4. Nausea control with Zofran and Phenergan PRN  5. Encourage ambulation and IS usages. 6. DVT prophylaxis with Heparin TID. 7. GI prophylaxis with Pepcid. 8. Plan for d/c home today.     Electronically signed by Betzy Tidwell DO  on 4/3/2019 at 6:28 AM

## 2019-04-03 NOTE — TELEPHONE ENCOUNTER
Spoke with  he said when they first got home pt had headache and BP was elevated at 161/92.     She has now had time to rest and is more relaxed, her current bp is 131/80 per their home machine and pt is feeling a bit better and sipping fluids

## 2019-04-04 ENCOUNTER — TELEPHONE (OUTPATIENT)
Dept: BARIATRICS/WEIGHT MGMT | Age: 39
End: 2019-04-04

## 2019-04-04 NOTE — PROGRESS NOTES
Addendum to discharge summary:    Assessment:  Acute blood loss anemia    Nickie Mendes DO, PGY-5  Pager#: [phone]  10:23 AM 4/4/2019

## 2019-04-04 NOTE — TELEPHONE ENCOUNTER
It will , and we can evaluate it tomorrow, probably is just passing a clot through it.     I talked to [facility] she can see me tomorrow afternoon

## 2019-04-04 NOTE — TELEPHONE ENCOUNTER
Pt's  called in on behalf of pt  C/o leaking of drainage on the outside of the tubing of drain on left side. Directed to cover with dry gauze and keep appt for tomorrow  Pt also got on the line - she states that when she is at rest, she is okay but when she gets up her pain is \"8\" on 0-10 scale. It is a pulling and burning sensation at the lt drain site. She states that she has been using a heating pad - I directed her to d/c heating pad and try topical ice pack for 20 minutes at a time.   Pt is already utilizing pain medication as directed

## 2019-04-04 NOTE — TELEPHONE ENCOUNTER
Next Visit Date:  4/5/2019    Patient Surgery Date  April 1    Type of  Surgery  bypass    Patient calls complaining of  Drain not draining    Onset: this morning    Timing:   Severity:     Progression: stable    Associated Symptoms: none    Any allergy  To medications     Did notask    Current  Pharmacy   Did not ask    Patient advised:   If  Symptoms worsen seek treatment at  Emergency Room. You will receive a call back from the office within 24-48 hours.     Is it ok to leave a message if we call back did not ask

## 2019-04-05 ENCOUNTER — OFFICE VISIT (OUTPATIENT)
Dept: BARIATRICS/WEIGHT MGMT | Age: 39
End: 2019-04-05

## 2019-04-05 VITALS
HEART RATE: 86 BPM | HEIGHT: 59 IN | DIASTOLIC BLOOD PRESSURE: 86 MMHG | SYSTOLIC BLOOD PRESSURE: 128 MMHG | WEIGHT: 250 LBS | BODY MASS INDEX: 50.4 KG/M2 | RESPIRATION RATE: 20 BRPM

## 2019-04-05 DIAGNOSIS — Z98.84 STATUS POST BARIATRIC SURGERY: Primary | ICD-10-CM

## 2019-04-05 PROCEDURE — 99024 POSTOP FOLLOW-UP VISIT: CPT | Performed by: SURGERY

## 2019-04-05 RX ORDER — SUCRALFATE ORAL 1 G/10ML
1 SUSPENSION ORAL 4 TIMES DAILY
Qty: 1200 ML | Refills: 3 | Status: SHIPPED | OUTPATIENT
Start: 2019-04-05 | End: 2019-05-03 | Stop reason: SINTOL

## 2019-04-05 NOTE — LETTER
[facility] INVASIVE BARIATRIC SURG  [address]  [address]  [address] [place]  [address]  Dept: [phone]    4/5/2019    Rosanna Mae had surgery 4/1/2019 and has been cared for by her  since that time period.     Please call with questions    Kind Regards,    Elizabeth Yeager, DO

## 2019-04-07 ENCOUNTER — HOSPITAL ENCOUNTER (OUTPATIENT)
Dept: ONCOLOGY | Age: 39
Discharge: HOME | End: 2019-04-07
Attending: SURGERY | Admitting: SURGERY
Payer: MEDICARE

## 2019-04-07 VITALS
RESPIRATION RATE: 16 BRPM | DIASTOLIC BLOOD PRESSURE: 80 MMHG | SYSTOLIC BLOOD PRESSURE: 129 MMHG | HEART RATE: 91 BPM | TEMPERATURE: 97.2 F | OXYGEN SATURATION: 94 %

## 2019-04-07 DIAGNOSIS — E86.0 DEHYDRATION: ICD-10-CM

## 2019-04-07 PROCEDURE — 2580000003 HC RX 258: Performed by: SURGERY

## 2019-04-07 PROCEDURE — 96361 HYDRATE IV INFUSION ADD-ON: CPT

## 2019-04-07 PROCEDURE — 96360 HYDRATION IV INFUSION INIT: CPT

## 2019-04-07 RX ORDER — KETOROLAC TROMETHAMINE 30 MG/ML
30 INJECTION, SOLUTION INTRAMUSCULAR; INTRAVENOUS ONCE
Status: DISCONTINUED | OUTPATIENT
Start: 2019-04-07 | End: 2019-04-07

## 2019-04-07 RX ORDER — 0.9 % SODIUM CHLORIDE 0.9 %
1000 INTRAVENOUS SOLUTION INTRAVENOUS ONCE
Status: COMPLETED | OUTPATIENT
Start: 2019-04-07 | End: 2019-04-07

## 2019-04-07 RX ORDER — KETOROLAC TROMETHAMINE 30 MG/ML
15 INJECTION, SOLUTION INTRAMUSCULAR; INTRAVENOUS ONCE
Status: DISCONTINUED | OUTPATIENT
Start: 2019-04-07 | End: 2019-04-07 | Stop reason: HOSPADM

## 2019-04-07 RX ADMIN — SODIUM CHLORIDE 1000 ML: 9 INJECTION, SOLUTION INTRAVENOUS at 12:28

## 2019-04-07 RX ADMIN — SODIUM CHLORIDE 1000 ML: 9 INJECTION, SOLUTION INTRAVENOUS at 11:24

## 2019-04-07 ASSESSMENT — PAIN SCALES - GENERAL: PAINLEVEL_OUTOF10: 0

## 2019-04-07 NOTE — PROGRESS NOTES
Doing well. Pain controlled. No fevers. Tolerating clears.   Had a bowel movement    /80   Pulse 91   Temp 97.2 °F (36.2 °C) (Oral)   Resp 16   LMP 03/01/2019   SpO2 94%       Abdom: soft, NT/ND, ELIZABETH serosang x 2

## 2019-04-07 NOTE — PROGRESS NOTES
[facility]  [facility] INVASIVE BARIATRIC SURG  [address]  [address]  [address] [place]  [address]  Dept: [phone]    SURGICAL WEIGHT LOSS MANAGEMENT PROGRAM  PROGRESS NOTE     CC: Weight Loss    Patient: Leeann Morse      Service Date: 4/5/2019  Visit:   4 day    Medical Record #: [id number]  Date of Surgery:   4/1/2019    Reason for Visit: Routine Post-Operative:  [] New Problem /   [] FU of existing problem    Patient seen for post op day 4 visit. Had some drainage around her drains and asked for evaluation. No nausea, tolerating clears. Ambulating and voiding. Having bowel movements. Height: 4' 10.5\" (1.486 m)  Highest Weight:   256 lbs    Current Visit Weight Information  Weight: 250 lb (113.4 kg)   BMI: Body mass index is 51.36 kg/m². Weight loss since surgery:     6    1 wk - D/C'd home on VTE Prohylaxis? [x] Yes   [] No   On VTE Proph as directed? [x] Yes   [] No     [Labs to be drawn prior to 1m, 3m, 6m, 12m, 18m, and annual visits]    Liver pathology:    [x] NA    [] No Gross path    [] Liver Steatosis   [] Discussed w/ pt   [] Referred to GI     Exercising?    [x] Yes    [] No     Review of Systems:     General  Negative for: [] Weight Change   [x] Fatigue   [x] Fevers & Chills    [] Appetite change [] Other:    Positive for: [x] Weight Change   [] Fatigue   [] Fevers & Chills    [] Appetite change [] Other:   Cardiac  Negative for: [x] Chest Pain   [] Difficulty Breathing   [] Leg Cramps [x] Edema of Lower Extremeties    [] Left   [] Right      Positive for: [] Chest Pain   [] Difficulty Breathing   [] Leg Cramps [] Edema of Lower Extremeties    [] Left   [] Right   Pulmonary  Negative for: [x] Shortness of Breath [] Wheeze [x] Cough  [x] Calf Pain     Positive for: [] Shortness of Breath [] Wheeze [] Cough  [] Calf Pain   Gastro-Intestinal Negative for: [x] Heartburn   [x] Reflux   [x] Dysphagia   [x] Melena   [x] BRBPR  [x] Vomiting   [x] Abdominal Pain   [x] Diarrhea     [x] Constipation  [] Other:     Positive for: [] Heartburn   [] Reflux   [] Dysphagia   [] Melena   [] BRBPR  [] Vomiting   [] Abdominal Pain   [] Diarrhea     [] Constipation  [] Other:    Muskuloskeletal Negative for: [] Joint pain   [] Back pain   [] Knee Pain   [x] Muscle weakness [x] Hernia   [x] Edema [] Other:     Positive for: [] Joint pain   [] Back pain   [] Knee Pain   [] Muscle weakness [] Hernia   [] Edema [] Other:    Neurologic Negative for: [x] Syncope   [x] Insomnia   [] Being treated for depression  [] Other:     Positive for: [] Syncope   [] Insomnia   [] Being treated for depression  [] Other:    Skin Negative for: [x] Wound:   [] Open   [] Draining   [] Incisional     [x] Rash   [x] Hair Loss  [] Other:     Positive for: [] Wound:   [] Open   [] Draining    [] Incisional  [] Rash   [] Hair Loss  [] Other:          Physical Assessment:     /86 (Site: Right Upper Arm, Position: Sitting, Cuff Size: Large Adult)   Pulse 86   Resp 20   Ht 4' 10.5\" (1.486 m)   Wt 250 lb (113.4 kg)   LMP 03/01/2019   BMI 51.36 kg/m²   General Negative for:  [x] Lapses in memory   [x] Unusual Stress   [x] Diffic Concen [] Unable to sleep   [] Eating in response to stress   [] Other:    Positive for:  [] Lapses in memory   [] Unusual Stress   [] Diffic Concen [] Unable to sleep   [] Eating in response to stress   [] Other:   Cardio-Pulmonary   [x] Heart RRR   [x] No murmurs   [x] Pulses nl x4 extrem    [x] Good inspiratory effort   [x] No wheezing     [x] Lungs clear to auscultation bilaterally    [] Other:    Gastro-Intestinal   [x] Abd S/NT/ND/Benign   [x] No abdominal mass/hernia    [x] No Splenomegaly    [] Other:    Muskuloskeletal   [x] Good muscle strength x4 extremities   [x] nl gait and ambul    [x] Nl ROM x4 extremities    [] Other:    Neurologic   [x] Alert and oriented x3    [] Other:   Skin   [x] Intact w/ no open wounds   [x] Incisions C/D/I    [] Steri strips removed   [x] No drainage or

## 2019-04-08 DIAGNOSIS — G62.9 NEUROPATHY: ICD-10-CM

## 2019-04-09 RX ORDER — GABAPENTIN 100 MG/1
CAPSULE ORAL
Qty: 90 CAPSULE | Refills: 2 | Status: SHIPPED | OUTPATIENT
Start: 2019-04-09 | End: 2019-08-07 | Stop reason: ALTCHOICE

## 2019-04-10 ENCOUNTER — TELEPHONE (OUTPATIENT)
Dept: BARIATRICS/WEIGHT MGMT | Age: 39
End: 2019-04-10

## 2019-04-12 ENCOUNTER — OFFICE VISIT (OUTPATIENT)
Dept: BARIATRICS/WEIGHT MGMT | Age: 39
End: 2019-04-12

## 2019-04-12 VITALS
SYSTOLIC BLOOD PRESSURE: 122 MMHG | HEIGHT: 59 IN | WEIGHT: 239 LBS | RESPIRATION RATE: 20 BRPM | DIASTOLIC BLOOD PRESSURE: 78 MMHG | HEART RATE: 80 BPM | BODY MASS INDEX: 48.18 KG/M2

## 2019-04-12 DIAGNOSIS — Z98.84 STATUS POST BARIATRIC SURGERY: Primary | ICD-10-CM

## 2019-04-12 PROCEDURE — 99024 POSTOP FOLLOW-UP VISIT: CPT | Performed by: SURGERY

## 2019-04-12 RX ORDER — PROMETHAZINE HYDROCHLORIDE 12.5 MG/1
12.5 TABLET ORAL EVERY 6 HOURS PRN
Qty: 20 TABLET | Refills: 0 | Status: SHIPPED | OUTPATIENT
Start: 2019-04-12 | End: 2019-05-03 | Stop reason: SDUPTHER

## 2019-04-18 ENCOUNTER — OFFICE VISIT (OUTPATIENT)
Dept: BARIATRICS/WEIGHT MGMT | Age: 39
End: 2019-04-18

## 2019-04-18 VITALS
HEART RATE: 72 BPM | SYSTOLIC BLOOD PRESSURE: 112 MMHG | HEIGHT: 59 IN | DIASTOLIC BLOOD PRESSURE: 68 MMHG | WEIGHT: 238 LBS | BODY MASS INDEX: 47.98 KG/M2 | RESPIRATION RATE: 20 BRPM

## 2019-04-18 DIAGNOSIS — Z98.84 STATUS POST BARIATRIC SURGERY: Primary | ICD-10-CM

## 2019-04-18 PROCEDURE — 99024 POSTOP FOLLOW-UP VISIT: CPT | Performed by: SURGERY

## 2019-04-21 NOTE — PROGRESS NOTES
Reflux   [] Dysphagia   [] Melena   [] BRBPR  [] Vomiting   [] Abdominal Pain   [] Diarrhea     [] Constipation  [] Other:    Muskuloskeletal Negative for: [] Joint pain   [] Back pain   [] Knee Pain   [x] Muscle weakness [x] Hernia   [x] Edema [] Other:     Positive for: [] Joint pain   [] Back pain   [] Knee Pain   [] Muscle weakness [] Hernia   [] Edema [] Other:    Neurologic Negative for: [x] Syncope   [x] Insomnia   [] Being treated for depression  [] Other:     Positive for: [] Syncope   [] Insomnia   [] Being treated for depression  [] Other:    Skin Negative for: [x] Wound:   [] Open   [] Draining   [] Incisional     [x] Rash   [x] Hair Loss  [] Other:     Positive for: [] Wound:   [] Open   [] Draining    [] Incisional  [] Rash   [] Hair Loss  [] Other:          Physical Assessment:     /68 (Site: Right Upper Arm, Position: Sitting, Cuff Size: Large Adult)   Pulse 72   Resp 20   Ht 4' 10.5\" (1.486 m)   Wt 238 lb (108 kg)   LMP 04/01/2019   BMI 48.90 kg/m²   General Negative for:  [x] Lapses in memory   [x] Unusual Stress   [x] Diffic Concen [] Unable to sleep   [] Eating in response to stress   [] Other:    Positive for:  [] Lapses in memory   [] Unusual Stress   [] Diffic Concen [] Unable to sleep   [] Eating in response to stress   [] Other:   Cardio-Pulmonary   [x] Heart RRR   [x] No murmurs   [x] Pulses nl x4 extrem    [x] Good inspiratory effort   [x] No wheezing     [x] Lungs clear to auscultation bilaterally    [] Other:    Gastro-Intestinal   [x] Abd S/NT/ND/Benign   [x] No abdominal mass/hernia    [x] No Splenomegaly    [] Other:    Muskuloskeletal   [x] Good muscle strength x4 extremities   [x] nl gait and ambul    [x] Nl ROM x4 extremities    [] Other:    Neurologic   [x] Alert and oriented x3    [] Other:   Skin   [x] Intact w/ no open wounds   [x] Incisions C/D/I    [] Steri strips removed   [x] No drainage or Infection    [] Other:      Assessment & Plan:      1.  Status post bariatric surgery              [x] Advance Diet    [x] Daily protein (65-75gm/day)   [x] Take Vitamins   [x] Attend Support Group    [x] Exercise Regularly     [x] Use contraception:    [] NA    [] s/p Hysterectomy   [] s/p Tubal ligation   [] Other:         Doing better    PPI    Lovenox    Ambulation    Sutures removed  Follow up: Return in about 1 month (around 5/16/2019). Orders placed this encounter:   No orders of the defined types were placed in this encounter. New Prescriptions:   No orders of the defined types were placed in this encounter. Electronically signed by Shaun Ovalle DO on 4/21/2019 at 12:33 PM    Please note that this chart was generated using voice recognition Dragon dictation software. Although every effort was made to ensure the accuracy of this automated transcription, some errors in transcription may have occurred.

## 2019-05-03 ENCOUNTER — OFFICE VISIT (OUTPATIENT)
Dept: INTERNAL MEDICINE | Age: 39
End: 2019-05-03
Payer: MEDICARE

## 2019-05-03 VITALS
HEART RATE: 86 BPM | WEIGHT: 236.4 LBS | SYSTOLIC BLOOD PRESSURE: 130 MMHG | BODY MASS INDEX: 33.84 KG/M2 | DIASTOLIC BLOOD PRESSURE: 73 MMHG | HEIGHT: 70 IN

## 2019-05-03 DIAGNOSIS — Z98.84 STATUS POST GASTRIC BYPASS FOR OBESITY: ICD-10-CM

## 2019-05-03 DIAGNOSIS — G47.33 OSA (OBSTRUCTIVE SLEEP APNEA): ICD-10-CM

## 2019-05-03 DIAGNOSIS — R11.0 NAUSEA: ICD-10-CM

## 2019-05-03 DIAGNOSIS — G62.9 NEUROPATHY: ICD-10-CM

## 2019-05-03 PROCEDURE — 99213 OFFICE O/P EST LOW 20 MIN: CPT | Performed by: STUDENT IN AN ORGANIZED HEALTH CARE EDUCATION/TRAINING PROGRAM

## 2019-05-03 PROCEDURE — G8427 DOCREV CUR MEDS BY ELIG CLIN: HCPCS | Performed by: STUDENT IN AN ORGANIZED HEALTH CARE EDUCATION/TRAINING PROGRAM

## 2019-05-03 PROCEDURE — G8417 CALC BMI ABV UP PARAM F/U: HCPCS | Performed by: STUDENT IN AN ORGANIZED HEALTH CARE EDUCATION/TRAINING PROGRAM

## 2019-05-03 PROCEDURE — 6360000002 HC RX W HCPCS

## 2019-05-03 PROCEDURE — 99211 OFF/OP EST MAY X REQ PHY/QHP: CPT | Performed by: INTERNAL MEDICINE

## 2019-05-03 PROCEDURE — 1036F TOBACCO NON-USER: CPT | Performed by: STUDENT IN AN ORGANIZED HEALTH CARE EDUCATION/TRAINING PROGRAM

## 2019-05-03 PROCEDURE — 96372 THER/PROPH/DIAG INJ SC/IM: CPT

## 2019-05-03 PROCEDURE — 1111F DSCHRG MED/CURRENT MED MERGE: CPT | Performed by: STUDENT IN AN ORGANIZED HEALTH CARE EDUCATION/TRAINING PROGRAM

## 2019-05-03 RX ORDER — CYANOCOBALAMIN 1000 UG/ML
1000 INJECTION INTRAMUSCULAR; SUBCUTANEOUS ONCE
Status: COMPLETED | OUTPATIENT
Start: 2019-05-03 | End: 2019-05-03

## 2019-05-03 RX ORDER — PROMETHAZINE HYDROCHLORIDE 12.5 MG/1
12.5 TABLET ORAL EVERY 6 HOURS PRN
Qty: 20 TABLET | Refills: 0 | Status: SHIPPED | OUTPATIENT
Start: 2019-05-03 | End: 2019-06-13

## 2019-05-03 RX ADMIN — CYANOCOBALAMIN 1000 MCG: 1000 INJECTION INTRAMUSCULAR; SUBCUTANEOUS at 13:58

## 2019-05-03 NOTE — PROGRESS NOTES
[facility]    Progress Note    Date of patient's visit: 5/3/2019  YOB: 1980  Patient's Name:  Mustapha Farley    Patient Care Team:  Jarrod Arreola MD as PCP - Juan M Velasco MD as PCP - S Attributed Provider  Lisy Peña MD as Consulting Physician (Pulmonology)    REASON FOR VISIT: Routine outpatient follow     HISTORY OF PRESENT ILLNESS:    History was obtained from the patient. Mustapha Farley is a 41 y.o. is here for a  routine outpatient follow-up. The patient underwent laparoscopic Sukhjinder-en-Y gastric bypass robotic-assisted surgery on 4/1/2019 for morbid obesity, BMI 51kg/m2. The patient reported that she has been having diarrhea, nausea, emesis, abdominal pain post surgery. She has been taking Phenergan for nausea and emesis. She has not been taking any vitamin supplements prescribed after bariatric surgery as it is making her nauseous, she tries taking over-the-counter vitamins sometimes. She she said that she will schedule an appointment with her surgeon this Monday for her symptoms. She has history of chronic migraines for which he takes Topamax, history of chronic back pain for which she takes Tylenol. She reported that HCTZ was discontinued by the bariatric surgeon, her blood pressure today is 130/70. Reports that her shortness of breath has improved after surgery. Patient has history of TINO, she has been using CPAP since the end of January, post Sukhjinder-en-Y gastric bypass she said that she couldn't tolerate the CPAP has  pressure building up in the chest.  She denies any chest pain, weakness, tingling, numbness, fever, chills.         Patient Active Problem List   Diagnosis    Chronic migraine    Neuropathy    Morbid obesity with BMI of 50.0-59.9, adult ([facility] [address]    Vitamin D deficiency    Antral gastritis    TINO (obstructive sleep apnea)    Status post gastric bypass for obesity    Dehydration       ALLERGIES Allergies   Allergen Reactions    Latex Hives and Itching         MEDICATIONS:      Current Outpatient Medications on File Prior to Visit   Medication Sig Dispense Refill    topiramate (TOPAMAX) 50 MG tablet take 1/2 tablet by mouth twice a day for 3 days then take 1 tablet by mouth twice a day 60 tablet 2    promethazine (PHENERGAN) 12.5 MG tablet Take 1 tablet by mouth every 6 hours as needed for Nausea 20 tablet 0    gabapentin (NEURONTIN) 100 MG capsule take 1 capsule by mouth three times a day 90 capsule 2    pantoprazole (PROTONIX) 40 MG tablet Take 1 tablet by mouth daily 60 tablet 3     No current facility-administered medications on file prior to visit. SOCIAL HISTORY    Reviewed and no change from previous record. Lisset Rehman  reports that she is a non-smoker but has been exposed to tobacco smoke.  She has never used smokeless tobacco.    FAMILY HISTORY:    Reviewed and No change from previous visit    REVIEW OF SYSTEMS:    ENT: negative  Respiratory: no cough, shortness of breath, or wheezing  Cardiovascular: no chest pain or dyspnea on exertion  Gastrointestinal: +abdominal pain, nausea, emesis, diarrhea   Neurological: no TIA or stroke symptoms  Endocrine: negative  Genito-Urinary: no dysuria, trouble voiding, or hematuria  Musculoskeletal backache   Dermatological: negative    PHYSICAL EXAM:      Vitals:    05/03/19 1256   BP: 130/73   Pulse: 86     General appearance - alert, well appearing, and in no distress  Mental status - alert, oriented to person, place, and time  Eyes - pupils equal and reactive, extraocular eye movements intactl  Nose - normal and patent, no erythema, discharge or polyps  Mouth - mucous membranes moist, pharynx normal without lesions  Neck - supple, no significant adenopathy  Lymphatics - no palpable lymphadenopathy, no hepatosplenomegaly  Chest - clear to auscultation, no wheezes, rales or rhonchi, symmetric air entry  Heart - normal rate, regular rhythm, normal S1, S2, no murmurs, rubs, clicks or gallops  Abdomen - soft, nontender, nondistended, no masses or organomegaly  Back exam - full range of motion, no tenderness, palpable spasm or pain on motion  Neurological - alert, oriented, normal speech, no focal findings or movement disorder noted  Musculoskeletal - no joint tenderness, deformity or swelling  Extremities - peripheral pulses normal, no pedal edema, no clubbing or cyanosis    LABORATORY FINDINGS:    CBC:  Lab Results   Component Value Date    WBC 8.0 04/03/2019    HGB 11.5 04/03/2019     04/03/2019     BMP:    Lab Results   Component Value Date     04/03/2019    K 3.7 04/03/2019     04/03/2019    CO2 23 04/03/2019    BUN 9 04/03/2019    CREATININE 0.53 04/03/2019    GLUCOSE 107 04/03/2019     HEMOGLOBIN A1C:   Lab Results   Component Value Date    LABA1C 5.2 10/24/2018     MICROALBUMIN URINE: No results found for: MICROALBUR  FASTING LIPID PANEL:  Lab Results   Component Value Date    CHOL 158 09/25/2018    HDL 35 (L) 09/25/2018    TRIG 239 (H) 09/25/2018     LIVER PROFILE:  Lab Results   Component Value Date    ALT 16 09/25/2018    AST 13 09/25/2018    PROT 6.7 03/11/2019    BILITOT 0.63 09/25/2018    BILIDIR 0.34 01/20/2013    LABALBU 4.3 09/25/2018      THYROID FUNCTION:   Lab Results   Component Value Date    TSH 2.08 10/24/2018      URINE ANALYSIS: No results found for: LABURIN  ASSESSMENT AND PLAN:    1. Chronic migraine  On topomax    2. TINO (obstructive sleep apnea)  Patient reported that she is unable to use a CPAP after Sukhjinder-en-Y gastric bypass. We will do a referral for sleep study    3. Neuropathy  Gabapentin 100 mg TID    4. Status post gastric bypass for obesity  Will give vitamin B12 injection today. Patient to follow-up with bariatric surgeon next week. FOLLOW UP:       1. Aniceto Hodgkins received counseling on the following healthy behaviors: nutrition and medication adherence  2.   Patient given educational materials - see patient instructions  3. Discussed use, benefit, and side effects of prescribed medications. Barriers to medication compliance addressed. All patient questions answered. Pt voiced understanding. 4.  Reviewed prior labs and health maintenance  5. Continue current medications, diet and exercise.       Sena Koenig MD  Internal Medicine, PGY1  [facility]

## 2019-05-03 NOTE — PROGRESS NOTES
Visit Information    Have you changed or started any medications since your last visit including any over-the-counter medicines, vitamins, or herbal medicines? no   Have you stopped taking any of your medications? Is so, why? -  yes - carafate, side effect  Are you having any side effects from any of your medications? - yes - carafate    Have you seen any other physician or provider since your last visit? yes - bariatrics 5/2   Have you had any other diagnostic tests since your last visit?  no   Have you been seen in the emergency room and/or had an admission in a hospital since we last saw you?  no   Have you had your routine dental cleaning in the past 6 months?  no     Do you have an active MyChart account? If no, what is the barrier?   Yes    Patient Care Team:  Kathyleen Phalen, MD as PCP - Queenie Emery MD as PCP - [facility] Attributed Provider  Juma Cruz MD as Consulting Physician (Pulmonology)    Medical History Review  Past Medical, Family, and Social History reviewed and does contribute to the patient presenting condition    Health Maintenance   Topic Date Due    Varicella Vaccine (1 of 2 - 13+ 2-dose series) 12/12/1994    DTaP/Tdap/Td vaccine (2 - Td) 06/30/2023    Cervical cancer screen  11/13/2023    Flu vaccine  Completed    HIV screen  Completed    Pneumococcal 0-64 years Vaccine  Aged Out

## 2019-05-03 NOTE — PROGRESS NOTES
ATTENDING PHYSICIAN STATEMENT     I have discussed the care of Hunter Zhong, including pertinent history and exam findings with the resident. I have reviewed the key elements of all parts of the encounter with the resident. I have seen and examined the patient with the resident and the key elements of all parts of the encounter have been performed by me. I agree with the assessment, and status of the problem list as documented. Additional Comments:  Patient is here status post gastric bypass surgery. She has been having nausea and vomiting due to small stomach size. She is on vitamin D supplement for vitamin D deficiency. September her B12 level was around 350. We will start her on B12 injection monthly. She has lost almost 30 pounds. She has obstructive sleep apnea and has been using CPAP machine. She feels that the pressure in the machine is high and she is unable to sleep properly with machine. She has frequent awakening in night when she sleeps without the machine. She has daytime sleepiness and snoring also    The plan and orders should include   Diagnosis Orders   1. Chronic migraine     2. TINO (obstructive sleep apnea)  Ambulatory referral to Sleep Medicine    Baseline Diagnostic Sleep Study    Sleep Study with PAP Titration   3. Neuropathy     4. Status post gastric bypass for obesity  cyanocobalamin injection 1,000 mcg   5. Nausea  promethazine (PHENERGAN) 12.5 MG tablet          The return visit should be in 3 months . Glenna Carlton MD  Attending and Faculty Internal Medicine  [facility]  Internal Medicine [address] [place] [address]   5/3/19 1:48 PM      This note is created with the assistance of a speech-recognition program. While intending to generate a document that actually reflects the content of the visit, the document can still have some mistakes which may not have been identified and corrected by editing.

## 2019-05-06 RX ORDER — PROMETHAZINE HYDROCHLORIDE 12.5 MG/1
12.5 TABLET ORAL EVERY 6 HOURS PRN
Qty: 20 TABLET | Refills: 0 | OUTPATIENT
Start: 2019-05-06

## 2019-05-07 PROBLEM — E86.0 DEHYDRATION: Status: RESOLVED | Noted: 2019-04-07 | Resolved: 2019-05-07

## 2019-05-31 ENCOUNTER — APPOINTMENT (OUTPATIENT)
Dept: GENERAL RADIOLOGY | Age: 39
End: 2019-05-31
Payer: MEDICARE

## 2019-05-31 ENCOUNTER — HOSPITAL ENCOUNTER (EMERGENCY)
Age: 39
Discharge: HOME | End: 2019-05-31
Attending: EMERGENCY MEDICINE
Payer: MEDICARE

## 2019-05-31 VITALS
TEMPERATURE: 98.4 F | OXYGEN SATURATION: 97 % | HEART RATE: 84 BPM | RESPIRATION RATE: 18 BRPM | SYSTOLIC BLOOD PRESSURE: 124 MMHG | BODY MASS INDEX: 46.18 KG/M2 | HEIGHT: 58 IN | WEIGHT: 220 LBS | DIASTOLIC BLOOD PRESSURE: 86 MMHG

## 2019-05-31 DIAGNOSIS — S93.401A SPRAIN OF RIGHT ANKLE, UNSPECIFIED LIGAMENT, INITIAL ENCOUNTER: ICD-10-CM

## 2019-05-31 DIAGNOSIS — G89.29 ACUTE EXACERBATION OF CHRONIC LOW BACK PAIN: Primary | ICD-10-CM

## 2019-05-31 DIAGNOSIS — M54.50 ACUTE EXACERBATION OF CHRONIC LOW BACK PAIN: Primary | ICD-10-CM

## 2019-05-31 PROCEDURE — 99283 EMERGENCY DEPT VISIT LOW MDM: CPT

## 2019-05-31 PROCEDURE — 73610 X-RAY EXAM OF ANKLE: CPT

## 2019-05-31 PROCEDURE — 6370000000 HC RX 637 (ALT 250 FOR IP): Performed by: STUDENT IN AN ORGANIZED HEALTH CARE EDUCATION/TRAINING PROGRAM

## 2019-05-31 RX ORDER — ACETAMINOPHEN 325 MG/1
650 TABLET ORAL EVERY 6 HOURS PRN
Qty: 20 TABLET | Refills: 0 | Status: SHIPPED | OUTPATIENT
Start: 2019-05-31 | End: 2019-06-21

## 2019-05-31 RX ORDER — ACETAMINOPHEN 325 MG/1
650 TABLET ORAL ONCE
Status: COMPLETED | OUTPATIENT
Start: 2019-05-31 | End: 2019-05-31

## 2019-05-31 RX ORDER — LIDOCAINE 50 MG/G
1 PATCH TOPICAL DAILY
Qty: 30 PATCH | Refills: 0 | Status: SHIPPED | OUTPATIENT
Start: 2019-05-31 | End: 2019-06-21

## 2019-05-31 RX ORDER — LIDOCAINE 4 G/G
1 PATCH TOPICAL ONCE
Status: DISCONTINUED | OUTPATIENT
Start: 2019-05-31 | End: 2019-05-31 | Stop reason: HOSPADM

## 2019-05-31 RX ADMIN — ACETAMINOPHEN 650 MG: 325 TABLET ORAL at 16:59

## 2019-05-31 ASSESSMENT — PAIN DESCRIPTION - PAIN TYPE: TYPE: ACUTE PAIN

## 2019-05-31 ASSESSMENT — ENCOUNTER SYMPTOMS
NAUSEA: 0
SHORTNESS OF BREATH: 0
BACK PAIN: 1
COLOR CHANGE: 0
VOMITING: 0

## 2019-05-31 ASSESSMENT — PAIN SCALES - GENERAL
PAINLEVEL_OUTOF10: 10
PAINLEVEL_OUTOF10: 6
PAINLEVEL_OUTOF10: 6

## 2019-05-31 ASSESSMENT — PAIN DESCRIPTION - ORIENTATION: ORIENTATION: LEFT

## 2019-05-31 ASSESSMENT — PAIN DESCRIPTION - LOCATION: LOCATION: BACK

## 2019-05-31 NOTE — ED PROVIDER NOTES
I performed a history and physical examination of the patient and discussed management with the resident. I reviewed the residents note and agree with the documented findings and plan of care. Any areas of disagreement are noted on the chart. I was personally present for the key portions of any procedures. I have documented in the chart those procedures where I was not present during the key portions. I have reviewed the emergency nurses triage note. I agree with the chief complaint, past medical history, past surgical history, allergies, medications, social and family history as documented unless otherwise noted below. Documentation of the HPI, Physical Exam and Medical Decision Making performed by medical students or scribes is based on my personal performance of the HPI, PE and MDM. For Phys Assistant/ Nurse Practitioner cases/documentation I have personally evaluated this patient and have completed at least one if not all key elements of the E/M (history, physical exam, and MDM). I find the patient's history and physical exam are consistent with the NP/PA documentation. I agree with the care provided, treatment rendered, disposition and followup plan. Additional findings are as noted. Tim Alvarez. Ag Matias MD  Attending Emergency  Physician    Ladonna Rolle. SUSTAINED INJURY TO RIGHT ANKLE(DESCRIPTION CONSISTENT WITH INVERSION MECHANISM) AND FELL ONTO RIGHT BUTTOCK. C/O PAIN IN ANKLE, RIGHT LOW BACK. NO HEADSTRIKE, LOC. NO NECK PAIN. NO NUMBNESS, WEAKNESS, TINGLING, DIST BOWEL OR BLADDER FUNC. HX OF UNSPECIFIED 'NEUROPATHY' WHICH IS BEING TREATED WITH GABAPENTIN. HX OF GASTRIC BYPASS SURGERY  AWAKE, ALERT, COOP, RESPONSIVE. RIGHT ANKLE-MILD LAT SWELLING/TENDERNESS. NO DEFORMITY/CREPITUS. NO SIGNIFICANT TENDERNESS OVER MALLEOLI, DELTOID LIG, PROX FIFTH METATARSAL, TARSAL NAVICULAR. BACK-MILD TENDERNESS OVER RIGHT UPPER LAT BUTTOCK REGION. NO SWELLING, ABRASION, ECCHYMOSIS.  GAIT MILDLY

## 2019-05-31 NOTE — ED PROVIDER NOTES
[facility] ED  Emergency Department Encounter  EmergencyMedicine Resident     Pt Kika Duncan  MRN: [id number]  [place] 1980  Date of evaluation: 5/31/19  PCP:  Vaishali Healy MD    CHIEF COMPLAINT       Chief Complaint   Patient presents with    Back Pain       HISTORY OF PRESENT ILLNESS  (Location/Symptom, Timing/Onset, Context/Setting, Quality, Duration, Modifying Factors, Severity.)      Maryann Jalloh is a 41 y.o. female who presents with right-sided lumbar pain radiating into her hip since falling approximately 6 hours prior to arrival, she states that she fell onto her right hip when this occurred. She admits to a past history of peripheral neuropathy, hypertension, and obesity. She has a past surgical history of Sukhjinder-en-Y gastric bypass in April 2019. She states that she used gabapentin and Tylenol since falling but vomited immedilately after taking  it did not completely resolve her symptoms. She states that the only difference between this and her prior back pain that is that it did not resolve with Tylenol. She denies any associated weakness or numbness. States she is able to ambulate with pain since this occurred. States that she did not fall on her back and she denies any loss of consciousness with this event. PAST MEDICAL / SURGICAL / SOCIAL / FAMILY HISTORY      has a past medical history of Gastric reflux, Hypertension, Migraine, Neuropathy, Obesities, morbid ([address] and TINO (obstructive sleep apnea).     has a past surgical history that includes Tubal ligation; Upper gastrointestinal endoscopy (N/A, 12/7/2018); [place] tooth extraction; Sukhjinder-en-Y Gastric Bypass (04/01/2019); and Sukhjinder-en-Y Gastric Bypass (N/A, 4/1/2019).     Social History     Socioeconomic History    Marital status:      Spouse name: Not on file    Number of children: Not on file    Years of education: Not on file    Highest education level: Not on file   Occupational History  Not on file   Social Needs    Financial resource strain: Not on file    Food insecurity:     Worry: Not on file     Inability: Not on file    Transportation needs:     Medical: Not on file     Non-medical: Not on file   Tobacco Use    Smoking status: Passive Smoke Exposure - Never Smoker    Smokeless tobacco: Never Used   Substance and Sexual Activity    Alcohol use: No    Drug use: No    Sexual activity: Never   Lifestyle    Physical activity:     Days per week: Not on file     Minutes per session: Not on file    Stress: Not on file   Relationships    Social connections:     Talks on phone: Not on file     Gets together: Not on file     Attends Confucianist service: Not on file     Active member of club or organization: Not on file     Attends meetings of clubs or organizations: Not on file     Relationship status: Not on file    Intimate partner violence:     Fear of current or ex partner: Not on file     Emotionally abused: Not on file     Physically abused: Not on file     Forced sexual activity: Not on file   Other Topics Concern    Not on file   Social History Narrative    Not on file       Family History   Problem Relation Age of Onset    Thyroid Disease Mother     Diabetes Mother     Heart Disease Mother     High Blood Pressure Mother     Migraines Mother     Diabetes Father     Heart Disease Father     Thyroid Disease Father     Liver Disease Father     High Blood Pressure Father     Cancer Father         liver    Thyroid Disease Sister     Neuropathy Sister     High Blood Pressure Sister     Kidney Disease Paternal Aunt     Heart Disease Paternal Uncle     Migraines Maternal Grandmother     Stroke Maternal Grandfather     Cancer Maternal Grandfather         breast    Other Paternal Grandmother         epilepsy    Diabetes Paternal Grandmother     High Blood Pressure Paternal Grandmother     Diabetes Paternal Grandfather     High Blood Pressure Paternal Grandfather  Heart Disease Paternal Grandfather        Allergies:  Latex    Home Medications:  Prior to Admission medications    Medication Sig Start Date End Date Taking? Authorizing Provider   acetaminophen (TYLENOL) 325 MG tablet Take 2 tablets by mouth every 6 hours as needed for Pain 5/31/19  Yes Mason Teague MD   lidocaine (LIDODERM) 5 % Place 1 patch onto the skin daily 12 hours on, 12 hours off. 5/31/19  Yes Mason Teague MD   promethazine (PHENERGAN) 12.5 MG tablet Take 1 tablet by mouth every 6 hours as needed for Nausea 5/3/19   Lisa Guerrero MD   topiramate (TOPAMAX) 50 MG tablet take 1/2 tablet by mouth twice a day for 3 days then take 1 tablet by mouth twice a day 4/15/19   [facility], MD   gabapentin (NEURONTIN) 100 MG capsule take 1 capsule by mouth three times a day 4/9/19 5/9/19  [facility], MD   pantoprazole (PROTONIX) 40 MG tablet Take 1 tablet by mouth daily 1/4/19   Cisco Alberto MD       REVIEW OF SYSTEMS    (2-9 systems for level 4, 10 or more for level 5)      Review of Systems   Constitutional: Negative for chills, fatigue and fever. Respiratory: Negative for shortness of breath. Cardiovascular: Negative for chest pain. Gastrointestinal: Negative for nausea and vomiting. Genitourinary: Negative for difficulty urinating. Musculoskeletal: Positive for back pain. Negative for neck pain. Skin: Negative for color change, rash and wound. Neurological: Negative for weakness, numbness and headaches. PHYSICAL EXAM   (up to 7 for level 4, 8 or more for level 5)      INITIAL VITALS:   /86   Pulse 84   Temp 98.4 °F (36.9 °C) (Oral)   Resp 18   Ht 4' 9.75\" (1.467 m)   Wt 220 lb (99.8 kg)   SpO2 97%   BMI 46.38 kg/m²     Physical Exam   Constitutional: She appears well-developed and well-nourished. No distress. HENT:   Head: Normocephalic and atraumatic.    Mouth/Throat: Oropharynx is clear and moist.   Eyes: Pupils are equal, round, and TECHNOLOGIST PROVIDED HISTORY: posterior lateral malleolar pain, fall Ordering Physician Provided Reason for Exam: twisted ankle, pain Acuity: Acute Type of Exam: Unknown FINDINGS: Mineralization and alignment are satisfactory. No acute fracture, dislocation, or appreciable localized soft tissue swelling is noted. Ankle mortise is uniform. Talar dome and talar walls are intact. A small plantar calcaneal spur is evident. No acute osseous abnormality right ankle. EKG  None    All EKG's are interpreted by the Emergency Department Physician who either signs or Co-signs this chart in the absence of a cardiologist.    EMERGENCY DEPARTMENT COURSE:  Patient is alert and oriented, no acute distress. Vital signs within normal limits. Mild midline lumbar tenderness and posterior lateral malleolar tenderness. As there is no trauma to the lumbar spine, I have low clinical suspicion for vertebral fracture. X-ray of the right ankle was obtained and unremarkable. Patient was treated symptomatically with Tylenol and Lidoderm patches significant improvement in pain. The patient was placed in an ankle air cast.  We'll discharge patient for outpatient follow-up, we'll provide the patient with symptomatic treatment. Do not feel that further imaging or workup are indicated at this time. PROCEDURES:  None    CONSULTS:  None    CRITICAL CARE:  None    FINAL IMPRESSION      1. Acute exacerbation of chronic low back pain    2.  Sprain of right ankle, unspecified ligament, initial encounter          DISPOSITION / PLAN     DISPOSITION Decision To Discharge 05/31/2019 05:44:35 PM      PATIENT REFERRED TO:  Claudy Lauren MD  [address] [phone]      As needed    [facility]  [address] [place] [address]  [phone]          DISCHARGE MEDICATIONS:  Discharge Medication List as of 5/31/2019  5:48 PM      START taking these medications    Details   acetaminophen (TYLENOL) 325 MG tablet Take 2 tablets by mouth every 6 hours as needed for Pain, Disp-20 tablet, R-0Print      lidocaine (LIDODERM) 5 % Place 1 patch onto the skin daily 12 hours on, 12 hours off., Disp-30 patch, R-0Print             Cedrick Souza D.O.   Emergency Medicine Resident    (Please note that portions ofthis note were completed with a voice recognition program.  Efforts were made to edit the dictations but occasionally words are mis-transcribed.)     Saurabh Tejada MD  05/31/19 0026

## 2019-06-07 DIAGNOSIS — R11.0 NAUSEA: ICD-10-CM

## 2019-06-07 RX ORDER — PROMETHAZINE HYDROCHLORIDE 12.5 MG/1
12.5 TABLET ORAL EVERY 6 HOURS PRN
Qty: 20 TABLET | Refills: 0 | OUTPATIENT
Start: 2019-06-07

## 2019-06-11 DIAGNOSIS — R11.0 NAUSEA: ICD-10-CM

## 2019-06-11 NOTE — TELEPHONE ENCOUNTER
pt states that she has been out of meds for sometimeishe states that pharm will not refill-she is confused as to who can refill-pcp, or surgeon

## 2019-06-12 ENCOUNTER — NURSE ONLY (OUTPATIENT)
Dept: BARIATRICS/WEIGHT MGMT | Age: 39
End: 2019-06-12

## 2019-06-12 NOTE — TELEPHONE ENCOUNTER
PC from pt, asking for refill from promethazine, states that she contacted bariatrics and they referred her to PCP for refill.      States that she is unable to eat without it, pt throwing up when trying to eat,     Pharmacy verified, please review and advise

## 2019-06-13 ENCOUNTER — NURSE TRIAGE (OUTPATIENT)
Dept: OTHER | Age: 39
End: 2019-06-13

## 2019-06-13 ENCOUNTER — HOSPITAL ENCOUNTER (OUTPATIENT)
Age: 39
Setting detail: OBSERVATION
Discharge: HOME | End: 2019-06-14
Attending: EMERGENCY MEDICINE | Admitting: SURGERY
Payer: MEDICARE

## 2019-06-13 ENCOUNTER — OFFICE VISIT (OUTPATIENT)
Dept: BARIATRICS/WEIGHT MGMT | Age: 39
End: 2019-06-13

## 2019-06-13 VITALS
HEART RATE: 66 BPM | RESPIRATION RATE: 20 BRPM | DIASTOLIC BLOOD PRESSURE: 72 MMHG | SYSTOLIC BLOOD PRESSURE: 116 MMHG | HEIGHT: 59 IN | BODY MASS INDEX: 44.15 KG/M2 | WEIGHT: 219 LBS

## 2019-06-13 DIAGNOSIS — R11.2 NAUSEA AND VOMITING, INTRACTABILITY OF VOMITING NOT SPECIFIED, UNSPECIFIED VOMITING TYPE: ICD-10-CM

## 2019-06-13 DIAGNOSIS — R19.7 DIARRHEA, UNSPECIFIED TYPE: ICD-10-CM

## 2019-06-13 DIAGNOSIS — E86.0 DEHYDRATION: Primary | ICD-10-CM

## 2019-06-13 DIAGNOSIS — Z98.84 STATUS POST BARIATRIC SURGERY: Primary | ICD-10-CM

## 2019-06-13 PROBLEM — R10.13 EPIGASTRIC PAIN: Status: ACTIVE | Noted: 2019-06-13

## 2019-06-13 LAB
-: ABNORMAL
ABSOLUTE EOS #: 0.06 K/UL (ref 0–0.44)
ABSOLUTE IMMATURE GRANULOCYTE: <0.03 K/UL (ref 0–0.3)
ABSOLUTE LYMPH #: 2.17 K/UL (ref 1.1–3.7)
ABSOLUTE MONO #: 0.39 K/UL (ref 0.1–1.2)
ALBUMIN SERPL-MCNC: 3.7 G/DL (ref 3.5–5.2)
ALBUMIN/GLOBULIN RATIO: 1.3 (ref 1–2.5)
ALP BLD-CCNC: 65 U/L (ref 35–104)
ALT SERPL-CCNC: 20 U/L (ref 5–33)
AMORPHOUS: ABNORMAL
ANION GAP SERPL CALCULATED.3IONS-SCNC: 10 MMOL/L (ref 9–17)
AST SERPL-CCNC: 20 U/L
BACTERIA: ABNORMAL
BASOPHILS # BLD: 1 % (ref 0–2)
BASOPHILS ABSOLUTE: 0.03 K/UL (ref 0–0.2)
BILIRUB SERPL-MCNC: 0.72 MG/DL (ref 0.3–1.2)
BILIRUBIN DIRECT: 0.22 MG/DL
BILIRUBIN URINE: NEGATIVE
BILIRUBIN, INDIRECT: 0.5 MG/DL (ref 0–1)
BUN BLDV-MCNC: 9 MG/DL (ref 6–20)
BUN/CREAT BLD: ABNORMAL (ref 9–20)
CALCIUM SERPL-MCNC: 9 MG/DL (ref 8.6–10.4)
CASTS UA: ABNORMAL /LPF (ref 0–2)
CHLORIDE BLD-SCNC: 108 MMOL/L (ref 98–107)
CO2: 20 MMOL/L (ref 20–31)
COLOR: ABNORMAL
COMMENT UA: ABNORMAL
CREAT SERPL-MCNC: 0.55 MG/DL (ref 0.5–0.9)
CRYSTALS, UA: ABNORMAL /HPF
DIFFERENTIAL TYPE: ABNORMAL
EOSINOPHILS RELATIVE PERCENT: 1 % (ref 1–4)
EPITHELIAL CELLS UA: ABNORMAL /HPF (ref 0–5)
GFR AFRICAN AMERICAN: >60 ML/MIN
GFR NON-AFRICAN AMERICAN: >60 ML/MIN
GFR SERPL CREATININE-BSD FRML MDRD: ABNORMAL ML/MIN/{1.73_M2}
GFR SERPL CREATININE-BSD FRML MDRD: ABNORMAL ML/MIN/{1.73_M2}
GLOBULIN: NORMAL G/DL (ref 1.5–3.8)
GLUCOSE BLD-MCNC: 101 MG/DL (ref 70–99)
GLUCOSE URINE: NEGATIVE
HCT VFR BLD CALC: 47.7 % (ref 36.3–47.1)
HEMOGLOBIN: 15.2 G/DL (ref 11.9–15.1)
IMMATURE GRANULOCYTES: 0 %
KETONES, URINE: ABNORMAL
LEUKOCYTE ESTERASE, URINE: ABNORMAL
LYMPHOCYTES # BLD: 36 % (ref 24–43)
MCH RBC QN AUTO: 31 PG (ref 25.2–33.5)
MCHC RBC AUTO-ENTMCNC: 31.9 G/DL (ref 28.4–34.8)
MCV RBC AUTO: 97.1 FL (ref 82.6–102.9)
MONOCYTES # BLD: 7 % (ref 3–12)
MUCUS: ABNORMAL
NITRITE, URINE: NEGATIVE
NRBC AUTOMATED: 0 PER 100 WBC
OTHER OBSERVATIONS UA: ABNORMAL
PDW BLD-RTO: 14.5 % (ref 11.8–14.4)
PH UA: 5.5 (ref 5–8)
PLATELET # BLD: 184 K/UL (ref 138–453)
PLATELET ESTIMATE: ABNORMAL
PMV BLD AUTO: 12.2 FL (ref 8.1–13.5)
POTASSIUM SERPL-SCNC: 4.1 MMOL/L (ref 3.7–5.3)
PROTEIN UA: NEGATIVE
RBC # BLD: 4.91 M/UL (ref 3.95–5.11)
RBC # BLD: ABNORMAL 10*6/UL
RBC UA: ABNORMAL /HPF (ref 0–2)
RENAL EPITHELIAL, UA: ABNORMAL /HPF
SEG NEUTROPHILS: 55 % (ref 36–65)
SEGMENTED NEUTROPHILS ABSOLUTE COUNT: 3.37 K/UL (ref 1.5–8.1)
SODIUM BLD-SCNC: 138 MMOL/L (ref 135–144)
SPECIFIC GRAVITY UA: 1.03 (ref 1–1.03)
TOTAL PROTEIN: 6.6 G/DL (ref 6.4–8.3)
TRICHOMONAS: ABNORMAL
TURBIDITY: ABNORMAL
URINE HGB: NEGATIVE
UROBILINOGEN, URINE: NORMAL
WBC # BLD: 6 K/UL (ref 3.5–11.3)
WBC # BLD: ABNORMAL 10*3/UL
WBC UA: ABNORMAL /HPF (ref 0–5)
YEAST: ABNORMAL

## 2019-06-13 PROCEDURE — 96374 THER/PROPH/DIAG INJ IV PUSH: CPT

## 2019-06-13 PROCEDURE — 81001 URINALYSIS AUTO W/SCOPE: CPT

## 2019-06-13 PROCEDURE — 87077 CULTURE AEROBIC IDENTIFY: CPT

## 2019-06-13 PROCEDURE — 99284 EMERGENCY DEPT VISIT MOD MDM: CPT

## 2019-06-13 PROCEDURE — 2580000003 HC RX 258: Performed by: NURSE PRACTITIONER

## 2019-06-13 PROCEDURE — 96361 HYDRATE IV INFUSION ADD-ON: CPT

## 2019-06-13 PROCEDURE — 6360000002 HC RX W HCPCS: Performed by: NURSE PRACTITIONER

## 2019-06-13 PROCEDURE — 87186 SC STD MICRODIL/AGAR DIL: CPT

## 2019-06-13 PROCEDURE — G0378 HOSPITAL OBSERVATION PER HR: HCPCS

## 2019-06-13 PROCEDURE — 80048 BASIC METABOLIC PNL TOTAL CA: CPT

## 2019-06-13 PROCEDURE — 87086 URINE CULTURE/COLONY COUNT: CPT

## 2019-06-13 PROCEDURE — 80076 HEPATIC FUNCTION PANEL: CPT

## 2019-06-13 PROCEDURE — 99024 POSTOP FOLLOW-UP VISIT: CPT | Performed by: SURGERY

## 2019-06-13 PROCEDURE — 6370000000 HC RX 637 (ALT 250 FOR IP): Performed by: STUDENT IN AN ORGANIZED HEALTH CARE EDUCATION/TRAINING PROGRAM

## 2019-06-13 PROCEDURE — 85025 COMPLETE CBC W/AUTO DIFF WBC: CPT

## 2019-06-13 RX ORDER — SUCRALFATE ORAL 1 G/10ML
1 SUSPENSION ORAL 4 TIMES DAILY
Qty: 1200 ML | Refills: 3 | Status: SHIPPED | OUTPATIENT
Start: 2019-06-13 | End: 2019-06-21

## 2019-06-13 RX ORDER — PROMETHAZINE HYDROCHLORIDE 25 MG/1
25 TABLET ORAL EVERY 6 HOURS PRN
Qty: 30 TABLET | Refills: 0 | Status: ON HOLD | OUTPATIENT
Start: 2019-06-13 | End: 2019-06-14

## 2019-06-13 RX ORDER — 0.9 % SODIUM CHLORIDE 0.9 %
1000 INTRAVENOUS SOLUTION INTRAVENOUS ONCE
Status: COMPLETED | OUTPATIENT
Start: 2019-06-13 | End: 2019-06-13

## 2019-06-13 RX ORDER — NITROFURANTOIN 25; 75 MG/1; MG/1
100 CAPSULE ORAL EVERY 12 HOURS SCHEDULED
Status: DISCONTINUED | OUTPATIENT
Start: 2019-06-13 | End: 2019-06-14 | Stop reason: HOSPADM

## 2019-06-13 RX ORDER — ONDANSETRON 2 MG/ML
4 INJECTION INTRAMUSCULAR; INTRAVENOUS ONCE
Status: COMPLETED | OUTPATIENT
Start: 2019-06-13 | End: 2019-06-13

## 2019-06-13 RX ADMIN — ONDANSETRON 4 MG: 2 INJECTION INTRAMUSCULAR; INTRAVENOUS at 20:36

## 2019-06-13 RX ADMIN — SODIUM CHLORIDE 1000 ML: 9 INJECTION, SOLUTION INTRAVENOUS at 20:36

## 2019-06-13 RX ADMIN — NITROFURANTOIN MONOHYDRATE/MACROCRYSTALLINE 100 MG: 25; 75 CAPSULE ORAL at 23:10

## 2019-06-13 ASSESSMENT — PAIN SCALES - GENERAL: PAINLEVEL_OUTOF10: 5

## 2019-06-13 ASSESSMENT — ENCOUNTER SYMPTOMS
ABDOMINAL PAIN: 1
DIARRHEA: 1
VOMITING: 1
NAUSEA: 1

## 2019-06-13 ASSESSMENT — PAIN DESCRIPTION - ORIENTATION: ORIENTATION: MID;UPPER

## 2019-06-13 ASSESSMENT — PAIN DESCRIPTION - PAIN TYPE: TYPE: ACUTE PAIN

## 2019-06-13 ASSESSMENT — PAIN DESCRIPTION - FREQUENCY: FREQUENCY: INTERMITTENT

## 2019-06-13 ASSESSMENT — PAIN DESCRIPTION - DESCRIPTORS: DESCRIPTORS: ACHING

## 2019-06-13 NOTE — PROGRESS NOTES
[facility]  [facility] INVASIVE BARIATRIC SURG  [address]  [address]  [address] [place]  [address]  Dept: [phone]    SURGICAL WEIGHT LOSS MANAGEMENT PROGRAM  PROGRESS NOTE     CC: Weight Loss    Patient: Gabriela Hercules      Service Date: 6/13/2019  Visit:   1 month   Medical Record #: [id number]  Date of Surgery:   4/1/2019    Reason for Visit: Routine Post-Operative:  [] New Problem /   [] FU of existing problem    Patient seen for post op 1 month visit. Having a lot of nausea and dysphagia with a fullness feeling with any solid foods. Ambulating and voiding. Having bowel movements. Height: 4' 10.5\" (1.486 m)  Highest Weight:   256 lbs    Current Visit Weight Information  Weight: 219 lb (99.3 kg)   BMI: Body mass index is 44.99 kg/m². Weight loss since surgery:     37    1 wk - D/C'd home on VTE Prohylaxis? [x] Yes   [] No   On VTE Proph as directed? [x] Yes   [] No     [Labs to be drawn prior to 1m, 3m, 6m, 12m, 18m, and annual visits]    Liver pathology:    [x] NA    [] No Gross path    [] Liver Steatosis   [] Discussed w/ pt   [] Referred to GI     Exercising?    [x] Yes    [] No     Review of Systems:     General  Negative for: [] Weight Change   [x] Fatigue   [x] Fevers & Chills    [] Appetite change [] Other:    Positive for: [x] Weight Change   [] Fatigue   [] Fevers & Chills    [] Appetite change [] Other:   Cardiac  Negative for: [x] Chest Pain   [] Difficulty Breathing   [] Leg Cramps [x] Edema of Lower Extremeties    [] Left   [] Right      Positive for: [] Chest Pain   [] Difficulty Breathing   [] Leg Cramps [] Edema of Lower Extremeties    [] Left   [] Right   Pulmonary  Negative for: [x] Shortness of Breath [] Wheeze [x] Cough  [x] Calf Pain     Positive for: [] Shortness of Breath [] Wheeze [] Cough  [] Calf Pain   Gastro-Intestinal Negative for: [x] Heartburn   [x] Reflux   [] Dysphagia   [x] Melena   [x] BRBPR  [x] Vomiting   [x] Abdominal Pain   [x] Diarrhea     [x] Constipation  [] Other:     Positive for: [] Heartburn   [] Reflux   [x] Dysphagia   [] Melena   [] BRBPR  [] Vomiting   [] Abdominal Pain   [] Diarrhea     [] Constipation  [] Other:    Muskuloskeletal Negative for: [] Joint pain   [] Back pain   [] Knee Pain   [x] Muscle weakness [x] Hernia   [x] Edema [] Other:     Positive for: [] Joint pain   [] Back pain   [] Knee Pain   [] Muscle weakness [] Hernia   [] Edema [] Other:    Neurologic Negative for: [x] Syncope   [x] Insomnia   [] Being treated for depression  [] Other:     Positive for: [] Syncope   [] Insomnia   [] Being treated for depression  [] Other:    Skin Negative for: [x] Wound:   [] Open   [] Draining   [] Incisional     [x] Rash   [x] Hair Loss  [] Other:     Positive for: [] Wound:   [] Open   [] Draining    [] Incisional  [] Rash   [] Hair Loss  [] Other:          Physical Assessment:     /72 (Site: Right Lower Arm, Position: Sitting, Cuff Size: Large Adult)   Pulse 66   Resp 20   Ht 4' 10.5\" (1.486 m)   Wt 219 lb (99.3 kg)   LMP 06/03/2019 (Approximate)   BMI 44.99 kg/m²   General Negative for:  [x] Lapses in memory   [x] Unusual Stress   [x] Diffic Concen [] Unable to sleep   [] Eating in response to stress   [] Other:    Positive for:  [] Lapses in memory   [] Unusual Stress   [] Diffic Concen [] Unable to sleep   [] Eating in response to stress   [] Other:   Cardio-Pulmonary   [x] Heart RRR   [x] No murmurs   [x] Pulses nl x4 extrem    [x] Good inspiratory effort   [x] No wheezing     [x] Lungs clear to auscultation bilaterally    [] Other:    Gastro-Intestinal   [x] Abd S/NT/ND/Benign   [x] No abdominal mass/hernia    [x] No Splenomegaly    [] Other:    Muskuloskeletal   [x] Good muscle strength x4 extremities   [x] nl gait and ambul    [x] Nl ROM x4 extremities    [] Other:    Neurologic   [x] Alert and oriented x3    [] Other:   Skin   [x] Intact w/ no open wounds   [x] Incisions C/D/I    [] Steri strips removed   [x] No drainage or Infection    [] Other:      Assessment & Plan:      1. Status post bariatric surgery              [x] Advance Diet    [x] Daily protein (65-75gm/day)   [x] Take Vitamins   [x] Attend Support Group    [x] Exercise Regularly     [x] Use contraception:    [] NA    [] s/p Hysterectomy   [] s/p Tubal ligation   [] Other:     Doing better overall. Some Dysphagia with solids. Seems to be worsening. Will schedule for EGD    PPI    Carafate    Exercise    Vitamins discussed  Follow up: No follow-ups on file. Orders placed this encounter:   Orders Placed This Encounter   Procedures    CBC Auto Differential    Comprehensive Metabolic Panel    Ferritin    Hemoglobin A1C    Iron and TIBC    Magnesium    PTH, Intact    Zinc    Vitamin B12    Vitamin A    T4, Free    TSH without Reflex    Vitamin B1, Whole Blood       New Prescriptions:   Orders Placed This Encounter   Medications    promethazine (PHENERGAN) 25 MG tablet     Sig: Take 1 tablet by mouth every 6 hours as needed for Nausea     Dispense:  30 tablet     Refill:  0    sucralfate (CARAFATE) 1 GM/10ML suspension     Sig: Take 10 mLs by mouth 4 times daily     Dispense:  1200 mL     Refill:  3        Electronically signed by Sabine Harris DO on 6/13/2019 at 10:16 AM    Please note that this chart was generated using voice recognition Dragon dictation software. Although every effort was made to ensure the accuracy of this automated transcription, some errors in transcription may have occurred.

## 2019-06-13 NOTE — TELEPHONE ENCOUNTER
Reason for Disposition   [1] SEVERE pain (e.g., excruciating) AND [2] present > 1 hour    Answer Assessment - Initial Assessment Questions  1. LOCATION: \"Where does it hurt? \"       Stomach    2. RADIATION: \"Does the pain shoot anywhere else? \" (e.g., chest, back)      Back. 3. ONSET: \"When did the pain begin? \" (e.g., minutes, hours or days ago)       Started around 4 pm    4. SUDDEN: \"Gradual or sudden onset? \"      Gradual     5. PATTERN \"Does the pain come and go, or is it constant? \"     - If constant: \"Is it getting better, staying the same, or worsening? \"       (Note: Constant means the pain never goes away completely; most serious pain is constant and it progresses)      - If intermittent: \"How long does it last?\" \"Do you have pain now? \"      (Note: Intermittent means the pain goes away completely between bouts)      When walking or standing. Consistant, relieved somewhat by laying down. 6. SEVERITY: \"How bad is the pain? \"  (e.g., Scale 1-10; mild, moderate, or severe)    - MILD (1-3): doesn't interfere with normal activities, abdomen soft and not tender to touch     - MODERATE (4-7): interferes with normal activities or awakens from sleep, tender to touch     - SEVERE (8-10): excruciating pain, doubled over, unable to do any normal activities       Severe 10/10    7. RECURRENT SYMPTOM: \"Have you ever had this type of abdominal pain before? \" If so, ask: \"When was the last time? \" and \"What happened that time? \"       No    8. CAUSE: \"What do you think is causing the abdominal pain? \"      Unknown    9. RELIEVING/AGGRAVATING FACTORS: \"What makes it better or worse? \" (e.g., movement, antacids, bowel movement)      satanding / walking    10. OTHER SYMPTOMS: \"Has there been any vomiting, diarrhea, constipation, or urine problems? \"        Vomiting \"foam\"    11. PREGNANCY: \"Is there any chance you are pregnant? \" \"When was your last menstrual period? \"        *No Answer*    Protocols used: ABDOMINAL PAIN -

## 2019-06-13 NOTE — TELEPHONE ENCOUNTER
Patient called stating that she has been in acute pain 10/10 with vomiting \"foam\" for over an hour. Patient stated that she had dinner at 4 pm and felt some discomfort, which is not abnormal. The pain increased between 4 pm  to 5 pm and now she states that her pain is 10/10 for the past hour. Patient states that she cannot walk standing up. Writer directed her to the ED. Patient stated at first that she needed to wait until er  came home from work about 6:30, but will call family to try to get transport to the ED sooner. She stated she had surgery on 4/1/19. She is scheduled for an endoscopy tomorrow.

## 2019-06-14 ENCOUNTER — ANESTHESIA EVENT (OUTPATIENT)
Dept: ENDOSCOPY | Age: 39
End: 2019-06-14
Payer: MEDICARE

## 2019-06-14 ENCOUNTER — ANESTHESIA (OUTPATIENT)
Dept: ENDOSCOPY | Age: 39
End: 2019-06-14
Payer: MEDICARE

## 2019-06-14 VITALS
RESPIRATION RATE: 16 BRPM | TEMPERATURE: 98.8 F | WEIGHT: 219 LBS | SYSTOLIC BLOOD PRESSURE: 126 MMHG | DIASTOLIC BLOOD PRESSURE: 67 MMHG | BODY MASS INDEX: 45.97 KG/M2 | OXYGEN SATURATION: 95 % | HEIGHT: 58 IN | HEART RATE: 82 BPM

## 2019-06-14 VITALS
SYSTOLIC BLOOD PRESSURE: 109 MMHG | RESPIRATION RATE: 27 BRPM | OXYGEN SATURATION: 96 % | DIASTOLIC BLOOD PRESSURE: 45 MMHG

## 2019-06-14 PROCEDURE — G0378 HOSPITAL OBSERVATION PER HR: HCPCS

## 2019-06-14 PROCEDURE — 2500000003 HC RX 250 WO HCPCS: Performed by: STUDENT IN AN ORGANIZED HEALTH CARE EDUCATION/TRAINING PROGRAM

## 2019-06-14 PROCEDURE — 7100000011 HC PHASE II RECOVERY - ADDTL 15 MIN: Performed by: SURGERY

## 2019-06-14 PROCEDURE — 6370000000 HC RX 637 (ALT 250 FOR IP): Performed by: STUDENT IN AN ORGANIZED HEALTH CARE EDUCATION/TRAINING PROGRAM

## 2019-06-14 PROCEDURE — 6360000002 HC RX W HCPCS: Performed by: SPECIALIST

## 2019-06-14 PROCEDURE — 6360000002 HC RX W HCPCS: Performed by: STUDENT IN AN ORGANIZED HEALTH CARE EDUCATION/TRAINING PROGRAM

## 2019-06-14 PROCEDURE — 43235 EGD DIAGNOSTIC BRUSH WASH: CPT | Performed by: SURGERY

## 2019-06-14 PROCEDURE — 2580000003 HC RX 258: Performed by: STUDENT IN AN ORGANIZED HEALTH CARE EDUCATION/TRAINING PROGRAM

## 2019-06-14 PROCEDURE — 7100000010 HC PHASE II RECOVERY - FIRST 15 MIN: Performed by: SURGERY

## 2019-06-14 PROCEDURE — 2580000003 HC RX 258: Performed by: SPECIALIST

## 2019-06-14 PROCEDURE — 3609012800 HC EGD DIAGNOSTIC ONLY: Performed by: SURGERY

## 2019-06-14 PROCEDURE — 3700000000 HC ANESTHESIA ATTENDED CARE: Performed by: SURGERY

## 2019-06-14 RX ORDER — PROMETHAZINE HYDROCHLORIDE 25 MG/1
25 TABLET ORAL EVERY 6 HOURS PRN
Status: DISCONTINUED | OUTPATIENT
Start: 2019-06-14 | End: 2019-06-14 | Stop reason: HOSPADM

## 2019-06-14 RX ORDER — PROMETHAZINE HYDROCHLORIDE 12.5 MG/1
12.5 TABLET ORAL EVERY 6 HOURS PRN
Qty: 20 TABLET | Refills: 0 | Status: SHIPPED | OUTPATIENT
Start: 2019-06-14 | End: 2019-06-21

## 2019-06-14 RX ORDER — PROPOFOL 10 MG/ML
INJECTION, EMULSION INTRAVENOUS PRN
Status: DISCONTINUED | OUTPATIENT
Start: 2019-06-14 | End: 2019-06-14 | Stop reason: SDUPTHER

## 2019-06-14 RX ORDER — SODIUM CHLORIDE, SODIUM LACTATE, POTASSIUM CHLORIDE, CALCIUM CHLORIDE 600; 310; 30; 20 MG/100ML; MG/100ML; MG/100ML; MG/100ML
INJECTION, SOLUTION INTRAVENOUS CONTINUOUS PRN
Status: DISCONTINUED | OUTPATIENT
Start: 2019-06-14 | End: 2019-06-14 | Stop reason: SDUPTHER

## 2019-06-14 RX ORDER — SODIUM CHLORIDE 0.9 % (FLUSH) 0.9 %
10 SYRINGE (ML) INJECTION EVERY 12 HOURS SCHEDULED
Status: DISCONTINUED | OUTPATIENT
Start: 2019-06-14 | End: 2019-06-14 | Stop reason: HOSPADM

## 2019-06-14 RX ORDER — ONDANSETRON 2 MG/ML
INJECTION INTRAMUSCULAR; INTRAVENOUS PRN
Status: DISCONTINUED | OUTPATIENT
Start: 2019-06-14 | End: 2019-06-14 | Stop reason: SDUPTHER

## 2019-06-14 RX ORDER — SODIUM CHLORIDE 0.9 % (FLUSH) 0.9 %
10 SYRINGE (ML) INJECTION PRN
Status: DISCONTINUED | OUTPATIENT
Start: 2019-06-14 | End: 2019-06-14 | Stop reason: HOSPADM

## 2019-06-14 RX ORDER — SUCRALFATE 1 G/1
1 TABLET ORAL 4 TIMES DAILY
Status: DISCONTINUED | OUTPATIENT
Start: 2019-06-14 | End: 2019-06-14 | Stop reason: HOSPADM

## 2019-06-14 RX ORDER — SUCRALFATE ORAL 1 G/10ML
1 SUSPENSION ORAL 4 TIMES DAILY
Status: DISCONTINUED | OUTPATIENT
Start: 2019-06-14 | End: 2019-06-14

## 2019-06-14 RX ORDER — NITROFURANTOIN 25; 75 MG/1; MG/1
100 CAPSULE ORAL EVERY 12 HOURS SCHEDULED
Qty: 10 CAPSULE | Refills: 0 | Status: SHIPPED | OUTPATIENT
Start: 2019-06-14 | End: 2019-06-19

## 2019-06-14 RX ORDER — SODIUM CHLORIDE, SODIUM LACTATE, POTASSIUM CHLORIDE, CALCIUM CHLORIDE 600; 310; 30; 20 MG/100ML; MG/100ML; MG/100ML; MG/100ML
INJECTION, SOLUTION INTRAVENOUS CONTINUOUS
Status: DISCONTINUED | OUTPATIENT
Start: 2019-06-14 | End: 2019-06-14 | Stop reason: HOSPADM

## 2019-06-14 RX ORDER — NITROFURANTOIN 25; 75 MG/1; MG/1
100 CAPSULE ORAL EVERY 12 HOURS SCHEDULED
Qty: 10 CAPSULE | Refills: 0 | Status: SHIPPED | OUTPATIENT
Start: 2019-06-14 | End: 2019-06-14

## 2019-06-14 RX ORDER — PROMETHAZINE HYDROCHLORIDE 25 MG/ML
12.5 INJECTION, SOLUTION INTRAMUSCULAR; INTRAVENOUS EVERY 4 HOURS PRN
Status: DISCONTINUED | OUTPATIENT
Start: 2019-06-14 | End: 2019-06-14 | Stop reason: HOSPADM

## 2019-06-14 RX ORDER — ONDANSETRON 2 MG/ML
4 INJECTION INTRAMUSCULAR; INTRAVENOUS EVERY 6 HOURS PRN
Status: DISCONTINUED | OUTPATIENT
Start: 2019-06-14 | End: 2019-06-14 | Stop reason: HOSPADM

## 2019-06-14 RX ORDER — MIDAZOLAM HYDROCHLORIDE 1 MG/ML
INJECTION INTRAMUSCULAR; INTRAVENOUS PRN
Status: DISCONTINUED | OUTPATIENT
Start: 2019-06-14 | End: 2019-06-14 | Stop reason: SDUPTHER

## 2019-06-14 RX ORDER — PANTOPRAZOLE SODIUM 40 MG/1
40 TABLET, DELAYED RELEASE ORAL DAILY
Status: DISCONTINUED | OUTPATIENT
Start: 2019-06-14 | End: 2019-06-14 | Stop reason: HOSPADM

## 2019-06-14 RX ADMIN — PANTOPRAZOLE SODIUM 40 MG: 40 TABLET, DELAYED RELEASE ORAL at 09:51

## 2019-06-14 RX ADMIN — THIAMINE HYDROCHLORIDE: 100 INJECTION, SOLUTION INTRAMUSCULAR; INTRAVENOUS at 10:16

## 2019-06-14 RX ADMIN — PROPOFOL 50 MG: 10 INJECTION, EMULSION INTRAVENOUS at 08:24

## 2019-06-14 RX ADMIN — ONDANSETRON 4 MG: 2 INJECTION, SOLUTION INTRAMUSCULAR; INTRAVENOUS at 08:33

## 2019-06-14 RX ADMIN — SODIUM CHLORIDE, POTASSIUM CHLORIDE, SODIUM LACTATE AND CALCIUM CHLORIDE: 600; 310; 30; 20 INJECTION, SOLUTION INTRAVENOUS at 00:36

## 2019-06-14 RX ADMIN — ONDANSETRON 4 MG: 2 INJECTION INTRAMUSCULAR; INTRAVENOUS at 01:49

## 2019-06-14 RX ADMIN — MIDAZOLAM HYDROCHLORIDE 2 MG: 1 INJECTION, SOLUTION INTRAMUSCULAR; INTRAVENOUS at 08:24

## 2019-06-14 RX ADMIN — ONDANSETRON 4 MG: 2 INJECTION INTRAMUSCULAR; INTRAVENOUS at 10:45

## 2019-06-14 RX ADMIN — SUCRALFATE 1 G: 1 TABLET ORAL at 12:15

## 2019-06-14 RX ADMIN — SUCRALFATE 1 G: 1 TABLET ORAL at 09:51

## 2019-06-14 RX ADMIN — NITROFURANTOIN MONOHYDRATE/MACROCRYSTALLINE 100 MG: 25; 75 CAPSULE ORAL at 09:51

## 2019-06-14 RX ADMIN — PROPOFOL 50 MG: 10 INJECTION, EMULSION INTRAVENOUS at 08:26

## 2019-06-14 RX ADMIN — SODIUM CHLORIDE, POTASSIUM CHLORIDE, SODIUM LACTATE AND CALCIUM CHLORIDE: 600; 310; 30; 20 INJECTION, SOLUTION INTRAVENOUS at 08:22

## 2019-06-14 RX ADMIN — PROMETHAZINE HYDROCHLORIDE 25 MG: 25 TABLET ORAL at 15:50

## 2019-06-14 ASSESSMENT — PAIN SCALES - GENERAL
PAINLEVEL_OUTOF10: 0

## 2019-06-14 NOTE — ED PROVIDER NOTES
Food insecurity:     Worry: Not on file     Inability: Not on file    Transportation needs:     Medical: Not on file     Non-medical: Not on file   Tobacco Use    Smoking status: Passive Smoke Exposure - Never Smoker    Smokeless tobacco: Never Used   Substance and Sexual Activity    Alcohol use: No    Drug use: No    Sexual activity: Never   Lifestyle    Physical activity:     Days per week: Not on file     Minutes per session: Not on file    Stress: Not on file   Relationships    Social connections:     Talks on phone: Not on file     Gets together: Not on file     Attends Taoist service: Not on file     Active member of club or organization: Not on file     Attends meetings of clubs or organizations: Not on file     Relationship status: Not on file    Intimate partner violence:     Fear of current or ex partner: Not on file     Emotionally abused: Not on file     Physically abused: Not on file     Forced sexual activity: Not on file   Other Topics Concern    Not on file   Social History Narrative    Not on file       Family History   Problem Relation Age of Onset    Thyroid Disease Mother     Diabetes Mother     Heart Disease Mother     High Blood Pressure Mother     Migraines Mother     Diabetes Father     Heart Disease Father     Thyroid Disease Father     Liver Disease Father     High Blood Pressure Father     Cancer Father         liver    Thyroid Disease Sister     Neuropathy Sister     High Blood Pressure Sister     Kidney Disease Paternal Aunt     Heart Disease Paternal Uncle     Migraines Maternal Grandmother     Stroke Maternal Grandfather     Cancer Maternal Grandfather         breast    Other Paternal Grandmother         epilepsy    Diabetes Paternal Grandmother     High Blood Pressure Paternal Grandmother     Diabetes Paternal Grandfather     High Blood Pressure Paternal Grandfather     Heart Disease Paternal Grandfather        Allergies:  Latex    Home Cardiovascular: Normal rate. Pulmonary/Chest: Effort normal. No respiratory distress. Abdominal: Soft. There is no tenderness. There is no rebound and no guarding. Musculoskeletal: Normal range of motion. Neurological: She is alert and oriented to person, place, and time. Skin: Skin is warm and dry. Capillary refill takes less than 2 seconds. Psychiatric: She has a normal mood and affect. Her behavior is normal. Judgment and thought content normal.   Nursing note and vitals reviewed. DIFFERENTIAL  DIAGNOSIS   Dehydration, esophageal stricture    PLAN (LABS / IMAGING / EKG):  Orders Placed This Encounter   Procedures    Urine Culture    Urinalysis Reflex to Culture    Basic Metabolic Panel    CBC Auto Differential    Microscopic Urinalysis    Hepatic Function Panel    Inpatient consult to General Surgery    Insert peripheral IV    PATIENT STATUS (FROM ED OR OR/PROCEDURAL) Observation       MEDICATIONS ORDERED:  Orders Placed This Encounter   Medications    0.9 % sodium chloride bolus    ondansetron (ZOFRAN) injection 4 mg    nitrofurantoin (macrocrystal-monohydrate) (MACROBID) capsule 100 mg       Controlled Substances Monitoring:      DIAGNOSTIC RESULTS / EMERGENCY DEPARTMENT COURSE / MDM     RADIOLOGY:   I directly visualized(with the attending physician) the following  images and reviewed the radiologist interpretations:  Xr Ankle Right (min 3 Views)    Result Date: 5/31/2019  EXAMINATION: 3 XRAY VIEWS OF THE RIGHT ANKLE 5/31/2019 5:04 pm COMPARISON: None. HISTORY: ORDERING SYSTEM PROVIDED HISTORY: posterior lateral malleolar pain, fall TECHNOLOGIST PROVIDED HISTORY: posterior lateral malleolar pain, fall Ordering Physician Provided Reason for Exam: twisted ankle, pain Acuity: Acute Type of Exam: Unknown FINDINGS: Mineralization and alignment are satisfactory. No acute fracture, dislocation, or appreciable localized soft tissue swelling is noted. Ankle mortise is uniform.   Talar dome and talar walls are intact. A small plantar calcaneal spur is evident. No acute osseous abnormality right ankle. No orders to display       LABS:  Results for orders placed or performed during the hospital encounter of 06/13/19   Urinalysis Reflex to Culture   Result Value Ref Range    Color, UA DARK YELLOW (A) YELLOW    Turbidity UA CLOUDY (A) CLEAR    Glucose, Ur NEGATIVE NEGATIVE    Bilirubin Urine NEGATIVE NEGATIVE    Ketones, Urine TRACE (A) NEGATIVE    Specific Gravity, UA 1.028 1.005 - 1.030    Urine Hgb NEGATIVE NEGATIVE    pH, UA 5.5 5.0 - 8.0    Protein, UA NEGATIVE NEGATIVE    Urobilinogen, Urine Normal Normal    Nitrite, Urine NEGATIVE NEGATIVE    Leukocyte Esterase, Urine SMALL (A) NEGATIVE    Urinalysis Comments Culture ordered based on defined criteria.     Basic Metabolic Panel   Result Value Ref Range    Glucose 101 (H) 70 - 99 mg/dL    BUN 9 6 - 20 mg/dL    CREATININE 0.55 0.50 - 0.90 mg/dL    Bun/Cre Ratio NOT REPORTED 9 - 20    Calcium 9.0 8.6 - 10.4 mg/dL    Sodium 138 135 - 144 mmol/L    Potassium 4.1 3.7 - 5.3 mmol/L    Chloride 108 (H) 98 - 107 mmol/L    CO2 20 20 - 31 mmol/L    Anion Gap 10 9 - 17 mmol/L    GFR Non-African American >60 >60 mL/min    GFR African American >60 >60 mL/min    GFR Comment          GFR Staging NOT REPORTED    CBC Auto Differential   Result Value Ref Range    WBC 6.0 3.5 - 11.3 k/uL    RBC 4.91 3.95 - 5.11 m/uL    Hemoglobin 15.2 (H) 11.9 - 15.1 g/dL    Hematocrit 47.7 (H) 36.3 - 47.1 %    MCV 97.1 82.6 - 102.9 fL    MCH 31.0 25.2 - 33.5 pg    MCHC 31.9 28.4 - 34.8 g/dL    RDW 14.5 (H) 11.8 - 14.4 %    Platelets 531 230 - 422 k/uL    MPV 12.2 8.1 - 13.5 fL    NRBC Automated 0.0 0.0 per 100 WBC    Differential Type NOT REPORTED     Seg Neutrophils 55 36 - 65 %    Lymphocytes 36 24 - 43 %    Monocytes 7 3 - 12 %    Eosinophils % 1 1 - 4 %    Basophils 1 0 - 2 %    Immature Granulocytes 0 0 %    Segs Absolute 3.37 1.50 - 8.10 k/uL    Absolute Lymph # 2. 17 1.10 - 3.70 k/uL    Absolute Mono # 0.39 0.10 - 1.20 k/uL    Absolute Eos # 0.06 0.00 - 0.44 k/uL    Basophils # 0.03 0.00 - 0.20 k/uL    Absolute Immature Granulocyte <0.03 0.00 - 0.30 k/uL    WBC Morphology NOT REPORTED     RBC Morphology ANISOCYTOSIS PRESENT     Platelet Estimate NOT REPORTED    Microscopic Urinalysis   Result Value Ref Range    -          WBC, UA 5 TO 10 0 - 5 /HPF    RBC, UA None 0 - 2 /HPF    Casts UA NOT REPORTED 0 - 2 /LPF    Crystals UA NOT REPORTED None /HPF    Epithelial Cells UA 5 TO 10 0 - 5 /HPF    Renal Epithelial, Urine NOT REPORTED 0 /HPF    Bacteria, UA MODERATE (A) None    Mucus, UA 2+ (A) None    Trichomonas, UA NOT REPORTED None    Amorphous, UA NOT REPORTED None    Other Observations UA NOT REPORTED NOT REQ. Yeast, UA NOT REPORTED None   Hepatic Function Panel   Result Value Ref Range    Alb 3.7 3.5 - 5.2 g/dL    Alkaline Phosphatase 65 35 - 104 U/L    ALT 20 5 - 33 U/L    AST 20 <32 U/L    Total Bilirubin 0.72 0.3 - 1.2 mg/dL    Bilirubin, Direct 0.22 <0.31 mg/dL    Bilirubin, Indirect 0.50 0.00 - 1.00 mg/dL    Total Protein 6.6 6.4 - 8.3 g/dL    Globulin NOT REPORTED 1.5 - 3.8 g/dL    Albumin/Globulin Ratio 1.3 1.0 - 2.5       EMERGENCY DEPARTMENT COURSE:  Advised patient surgery commends admission to the hospital for IV fluids. She is agreeable after discussion. She does have a EGD scheduled for the morning at our facility. CONSULTS:  Surgery agrees to see patient    PROCEDURES:  None    FINAL IMPRESSION      1. Dehydration    2. Nausea and vomiting, intractability of vomiting not specified, unspecified vomiting type    3.  Diarrhea, unspecified type          DISPOSITION / PLAN     DISPOSITION Admitted    PATIENT REFERRED TO:  Pamela Bojorquez MD  [address] [facility] [address]   Kenya Acosta ProcKaty Yee Joaquín [address] [place] [address]  [phone]            DISCHARGE MEDICATIONS:  New Prescriptions    No medications on file

## 2019-06-14 NOTE — ANESTHESIA POSTPROCEDURE EVALUATION
Department of Anesthesiology  Postprocedure Note    Patient: Wandy Trejo  MRN: [id number]  YOB: 1980  Date of evaluation: 6/14/2019  Time:  9:08 AM     Procedure Summary     Date:  06/14/19 Room / Location:  [facility] 04 / [facility]    Anesthesia Start:  2438 Anesthesia Stop:  1144    Procedure:  egd  (N/A ) Diagnosis:  (VOMITING, GERD, S/P BARIATRIC SURGERY)    Surgeon:  Pgegy Yun DO Responsible Provider:  Matthias Zarco MD    Anesthesia Type:  MAC ASA Status:  3          Anesthesia Type: MAC    Lydia Phase I: Lydia Score: 10    Lydia Phase II: Lyida Score: 9    Last vitals: Reviewed and per EMR flowsheets.        Anesthesia Post Evaluation    Patient location during evaluation: PACU  Patient participation: complete - patient participated  Level of consciousness: awake and alert  Pain score: 0  Airway patency: patent  Nausea & Vomiting: no vomiting and no nausea  Complications: no  Cardiovascular status: blood pressure returned to baseline  Respiratory status: acceptable  Hydration status: stable

## 2019-06-14 NOTE — ED NOTES
Pt resting in bed. Family remains at bedside. She denies the need for anything at this time.       Junior Javier RN  06/13/19 1441

## 2019-06-14 NOTE — H&P
GENERAL SURGERY H&P   [facility]      Patient's Name/ Date of Birth/ Gender: Reuben Leon / 1980 (41 y.o.) / female     MRN/ACCOUNT #: [de-identified]    Consulting Physician: Dr. Mary Marcelo    History of present Illness: Reuben Leon is a 41 y.o. female, who presented to the hospital with c/o abdominal pain, nausea and emesis. Pt saw Dr. Mary Marcelo today at the office and was scheduled for EGD tomorrow. She claims that since her office visit she was went home and ate some noodle soup. She then had intense abdominal pain that felt like a wringing feeling in her stomach. She doubled over and then started to have diarrhea and emesis. Denies blood in her stool or emesis. She has been having nausea and emesis for a few weeks now intermittently when she eats. She claims to not be able to keep much down besides broth. She denies fevers but admits to some chills. She denies CP or SOB. She does have a history of RNY on April 1st. She has well healed incision sites. She claims her BMs have been normal for her. Denies taking daily medications other than protonix.     Past Medical History:   Past Medical History:   Diagnosis Date    Gastric reflux     Hypertension     Migraine     Neuropathy     Obesities, morbid ([address] 8/31/2018    TINO (obstructive sleep apnea) 12/17/2018    USES CPAP       Past Surgical History:   Past Surgical History:   Procedure Laterality Date    ZAID-EN-Y GASTRIC BYPASS  04/01/2019    ROBOTIC LAPAROSCOPIC GASTRIC BYPASS ZAID-EN-Y, LIVER BIOPSY, EGD     ZAID-EN-Y GASTRIC BYPASS N/A 4/1/2019    XI ROBOTIC LAPAROSCOPIC GASTRIC BYPASS ZAID-EN-Y, LIVER BIOPSY, EGD performed by José Miguel Beltran DO at [address] [place] [address],[address] Floor ENDOSCOPY N/A 12/7/2018    EGD ESOPHAGOGASTRODUODENOSCOPY performed by José Miguel Beltran DO at [facility]    WISDOM TOOTH EXTRACTION         Social History:  reports that she is a non-smoker but has been exposed to tobacco smoke. She has never used smokeless tobacco. She reports that she does not drink alcohol or use drugs. Family History: family history includes Cancer in her father and maternal grandfather; Diabetes in her father, mother, paternal grandfather, and paternal grandmother; Heart Disease in her father, mother, paternal grandfather, and paternal uncle; High Blood Pressure in her father, mother, paternal grandfather, paternal grandmother, and sister; Kidney Disease in her paternal aunt; Liver Disease in her father; Migraines in her maternal grandmother and mother; Neuropathy in her sister; Other in her paternal grandmother; Stroke in her maternal grandfather; Thyroid Disease in her father, mother, and sister. Review of Systems:   General: Denies fever, chills, night sweats, weight loss, malaise, fatigue  HEENT: Denies sore throat, sinus problems, allergic rhinosinusitis  Card: Denies chest pain, palpitations, orthopnea/PND. Denies h/o murmurs  Pulm: Denies cough, shortness of breath, VILLATORO  GI: per HPI  : Denies polyuria, dysuria, hematuria  Endo: No Diabetes  Heme: Denies anemia, h/o bleeding or clotting problems. Neuro: Denies h/o CVA, TIA  Skin: Denies rashes, ulcers  Musculoskeletal: Denies muscle, joint, back pain. Allergies: Latex    Current Meds:No current facility-administered medications for this encounter.      Current Outpatient Medications:     promethazine (PHENERGAN) 25 MG tablet, Take 1 tablet by mouth every 6 hours as needed for Nausea, Disp: 30 tablet, Rfl: 0    sucralfate (CARAFATE) 1 GM/10ML suspension, Take 10 mLs by mouth 4 times daily, Disp: 1200 mL, Rfl: 3    acetaminophen (TYLENOL) 325 MG tablet, Take 2 tablets by mouth every 6 hours as needed for Pain, Disp: 20 tablet, Rfl: 0    lidocaine (LIDODERM) 5 %, Place 1 patch onto the skin daily 12 hours on, 12 hours off., Disp: 30 patch, Rfl: 0    topiramate (TOPAMAX) 50 MG tablet, take 1/2 tablet by mouth twice a day for 3 days then take 1 tablet by mouth twice a day, Disp: 60 tablet, Rfl: 2    gabapentin (NEURONTIN) 100 MG capsule, take 1 capsule by mouth three times a day, Disp: 90 capsule, Rfl: 2    pantoprazole (PROTONIX) 40 MG tablet, Take 1 tablet by mouth daily, Disp: 60 tablet, Rfl: 3    Vital Signs:  Vitals:    06/13/19 2014   BP:    Pulse:    Resp: 17   Temp:    SpO2:        Physical Exam:  Vital signs and Nurse's note reviewed  Gen:  A&Ox3, NAD  HEENT: NCAT, PERRLA, EOMI, no scleral icterus, oral mucosa moist  Neck: Supple, no thyroid enlargement, no cervical or supraclavicular lymphaedenopathy  Chest: Symmetric rise with inhalation, no evidence of trauma  CVS: Regular rate and rhythm  Resp: Good bilateral air entry, clear to auscultation b/l  Abd: soft, tender in RUQ and epigastric area, non-distended, no hepatosplenomegaly or palpable masses, obese, no guarding, non peritoneal  Ext: No clubbing, cyanosis, edema, peripheral pulses 2+ Rad/Fem/DP/PT  CNS: Moves all extremities, no gross focal motor deficits  Skin: No erythema or ulcerations     Labs:  CBC   Lab Results   Component Value Date    WBC 6.0 06/13/2019    HGB 15.2 (H) 06/13/2019    HCT 47.7 (H) 06/13/2019    MCV 97.1 06/13/2019     06/13/2019     BMP  Lab Results   Component Value Date    GLUCOSE 101 (H) 06/13/2019    CALCIUM 9.0 06/13/2019     06/13/2019    K 4.1 06/13/2019    CO2 20 06/13/2019     (H) 06/13/2019    BUN 9 06/13/2019    CREATININE 0.55 06/13/2019     PT/INR   Lab Results   Component Value Date    INR 1.0 03/18/2019    INR 1.0 07/06/2016    PROTIME 10.3 03/18/2019    PROTIME 10.3 07/06/2016     LFT   Lab Results   Component Value Date    AST 13 09/25/2018    ALT 16 09/25/2018    ALKPHOS 59 09/25/2018    ALBUMIN 1.3 09/25/2018    LIPASE 26 01/20/2013       Problem List:  Patient Active Problem List    Diagnosis Date Noted    Status post gastric bypass for obesity 04/01/2019    TINO (obstructive sleep apnea) 12/17/2018    Antral gastritis     Vitamin D deficiency 09/26/2018    Neuropathy 08/31/2018    Morbid obesity with BMI of 50.0-59.9, adult ([facility] [address] 08/31/2018    Chronic migraine        Impression:    Skye Mills is a 41 y.o. female with epigastric pain, nausea and emesis    Recommendation:    1. Will admit patient overnight  2. IV fluids  3. Protonix and carafate  4. NPO  5. Pt already has scheduled EGD for tomorrow  6.  Pain and nausea control      Electronically signed by Penelope Quach DO  on 6/13/2019 at 9:52 PM

## 2019-06-14 NOTE — ANESTHESIA PRE PROCEDURE
Department of Anesthesiology  Preprocedure Note       Name:  Eugenia Rossi   Age:  41 y.o.  :  1980                                          MRN:  [id number]         Date:  2019      Surgeon: Bassem Barreto):  Ashkan Arechiga DO    Procedure: EGD WITH DILATION (N/A )    Medications prior to admission:   Prior to Admission medications    Medication Sig Start Date End Date Taking?  Authorizing Provider   promethazine (PHENERGAN) 25 MG tablet Take 1 tablet by mouth every 6 hours as needed for Nausea 19  Yes Ashkan Arechiga DO   pantoprazole (PROTONIX) 40 MG tablet Take 1 tablet by mouth daily 19  Yes Josiane Amin MD   sucralfate (CARAFATE) 1 GM/10ML suspension Take 10 mLs by mouth 4 times daily 19   Ashkan Arechiga DO   acetaminophen (TYLENOL) 325 MG tablet Take 2 tablets by mouth every 6 hours as needed for Pain 19   Marc Ken MD   lidocaine (LIDODERM) 5 % Place 1 patch onto the skin daily 12 hours on, 12 hours off. 19   Marc Ken MD   topiramate (TOPAMAX) 50 MG tablet take 1/2 tablet by mouth twice a day for 3 days then take 1 tablet by mouth twice a day 4/15/19   Veena Guidry MD   gabapentin (NEURONTIN) 100 MG capsule take 1 capsule by mouth three times a day 19  Veena Guidry MD       Current medications:    Current Facility-Administered Medications   Medication Dose Route Frequency Provider Last Rate Last Dose    pantoprazole (PROTONIX) tablet 40 mg  40 mg Oral Daily Britni Bathe, DO        sodium chloride flush 0.9 % injection 10 mL  10 mL Intravenous 2 times per day Britni Bathe, DO        sodium chloride flush 0.9 % injection 10 mL  10 mL Intravenous PRN Britni Bathe, DO        magnesium hydroxide (MILK OF MAGNESIA) 400 MG/5ML suspension 30 mL  30 mL Oral Daily PRN Britni Bathe, DO        ondansetron [facility]) injection 4 mg  4 mg Intravenous Q6H PRN Britni Bathe, DO   4 mg at 19 0149    promethazine (PHENERGAN) injection 12.5 mg  12.5 mg Intramuscular Q4H PRN [facility], DO        HYDROmorphone (DILAUDID) injection 0.5 mg  0.5 mg Intravenous Q4H PRN [facility],         sucralfate (CARAFATE) tablet 1 g  1 g Oral 4x Daily Penelope Wildwood,         lactated ringers infusion   Intravenous Continuous [facility],  mL/hr at 06/14/19 0036      nitrofurantoin (macrocrystal-monohydrate) (MACROBID) capsule 100 mg  100 mg Oral 2 times per day [facility], DO   100 mg at 06/13/19 2310       Allergies: Allergies   Allergen Reactions    Latex Hives and Itching       Problem List:    Patient Active Problem List   Diagnosis Code    Chronic migraine G43.709    Neuropathy G62.9    Morbid obesity with BMI of 50.0-59.9, adult (HCC) E66.01, Z68.43    Vitamin D deficiency E55.9    Antral gastritis K29.50    TINO (obstructive sleep apnea) G47.33    Status post gastric bypass for obesity Z98.84    Epigastric pain R10.13       Past Medical History:        Diagnosis Date    Gastric reflux     Hypertension     Migraine     Neuropathy     Obesities, morbid ([facility] [address] 8/31/2018    TINO (obstructive sleep apnea) 12/17/2018    USES CPAP       Past Surgical History:        Procedure Laterality Date    ABDOMEN SURGERY      ZAID-EN-Y GASTRIC BYPASS  04/01/2019    ROBOTIC LAPAROSCOPIC GASTRIC BYPASS ZAID-EN-Y, LIVER BIOPSY, EGD     ZAID-EN-Y GASTRIC BYPASS N/A 4/1/2019    XI ROBOTIC LAPAROSCOPIC GASTRIC BYPASS ZAID-EN-Y, LIVER BIOPSY, EGD performed by Gabbi Musa DO at [address] ENDOSCOPY N/A 12/7/2018    EGD ESOPHAGOGASTRODUODENOSCOPY performed by Gabbi Musa DO at [facility]    WISDOM TOOTH EXTRACTION         Social History:    Social History     Tobacco Use    Smoking status: Passive Smoke Exposure - Never Smoker    Smokeless tobacco: Never Used   Substance Use Topics    Alcohol use:  No                                Counseling given: Not (+) sleep apnea:                             Cardiovascular:    (+) hypertension:,         Rhythm: regular  Rate: normal                    Neuro/Psych:   (+) headaches:,             GI/Hepatic/Renal: Neg GI/Hepatic/Renal ROS            Endo/Other: Negative Endo/Other ROS                    Abdominal:           Vascular: negative vascular ROS. Anesthesia Plan      MAC     ASA 3       Induction: intravenous. Anesthetic plan and risks discussed with patient. Use of blood products discussed with patient whom. Plan discussed with CRNA.                   MIKO Cook - CRNA   6/14/2019

## 2019-06-14 NOTE — OP NOTE
Operative Note  ______________________________________________________________    Patient: Abhishek Malik  YOB: 1980  MRN: [id number]  Date of Procedure: 6/14/2019    Pre-Op Diagnosis: VOMITING, GERD, S/P BARIATRIC SURGERY    Post-Op Diagnosis: Same       Procedure(s):  EGD     Anesthesia: Monitor Anesthesia Care    Surgeon(s):  Elizabeth Yeager DO    Assistant: Tiffany Willson, PGY 4    Estimated Blood Loss (mL): none     Complications: None    Specimens:   * No specimens in log *    Implants:  * No implants in log *      Drains:   [REMOVED] Closed/Suction Drain Right RLQ Bulb 23 Filipino (Removed)       [REMOVED] Closed/Suction Drain Lateral;Left Abdomen Bulb 19 Filipino (Removed)       Findings: Wound Class II; normal bariatric anatomy     Indications: This is a 42 y/o female s/p antione-en-y gastric bypass in April 2019. She was being seen in follow up and continues to have abdominal pain. She was scheduled for elective EGD this am, but came into ED for worsening abdominal pain. The decision as made for admission, IVF, pain control, and EGD in am. The risks and benefits were discussed in detail and signed consent was obtained. Operative procedure:    Patient was brought to the endoscopy suite. Time out was performed verifying correct patient, position, equipment and procedure to be performed. The patient was placed in the Left lateral decubitus position. SCD cuffs were placed on the bilateral lower legs for DVT prophylaxis. MAC anesthesia was induced. A protective bite block was inserted into the patient's oral cavity. The endoscope was inserted into the oropharynx where the vocal cords were visualized and the esophagus was intubated without difficulty. The scope was advanced, no abnormalities were noted in the esophagus or GE junction. The scope was advanced into the stomach, there were no abnormalities noted. The scope was easily advanced into the antione limb.  There were no mucosal abnormalities, no strictures, and the bariatric anatomy was normal. The scope was then removed under direct visualization.        Brianda Alvarez DO  Date: 6/14/2019  Time: 1:35 PM

## 2019-06-14 NOTE — ED NOTES
Pt presents to the ER with complaints of abd pain that started a few hours ago. She states she had gastric bypass in April and the past few days she has been only able to tolerate liquids again. Her dr today stated she may need a dilation. After apt pt went home and was throwing up her soup and her dr told her to come to the er.       Annika Candelaria RN  06/13/19 [id number]

## 2019-06-14 NOTE — BRIEF OP NOTE
Brief Postoperative Note  ______________________________________________________________    Patient: Gabby Hernandez  YOB: 1980  MRN: [id number]  Date of Procedure: 6/14/2019    Pre-Op Diagnosis: VOMITING, GERD, S/P BARIATRIC SURGERY    Post-Op Diagnosis: Same; normal bariatric anatomy        Procedure(s):  egd     Anesthesia: Monitor Anesthesia Care    Surgeon(s):  Rebecca Ascencio DO    Assistant: Caryn Willson     Estimated Blood Loss (mL): none     Complications: None    Specimens:   * No specimens in log *    Implants:  * No implants in log *      Drains:   [REMOVED] Closed/Suction Drain Right RLQ Bulb 19 Paraguayan (Removed)       [REMOVED] Closed/Suction Drain Lateral;Left Abdomen Bulb 19 [place]ayan (Removed)       Findings: Wound Class II; normal bariatric anatomy     Terra December, DO  Date: 6/14/2019  Time: 8:33 AM

## 2019-06-14 NOTE — ED PROVIDER NOTES
[facility]     Emergency Department     Faculty Attestation    I performed a history and physical examination of the patient and discussed management with the resident. I reviewed the residents note and agree with the documented findings and plan of care. Any areas of disagreement are noted on the chart. I was personally present for the key portions of any procedures. I have documented in the chart those procedures where I was not present during the key portions. I have reviewed the emergency nurses triage note. I agree with the chief complaint, past medical history, past surgical history, allergies, medications, social and family history as documented unless otherwise noted below. For Physician Assistant/ Nurse Practitioner cases/documentation I have personally evaluated this patient and have completed at least one if not all key elements of the E/M (history, physical exam, and MDM). Additional findings are as noted. I have personally seen and evaluated the patient. I find the patient's history and physical exam are consistent with the NP/PA documentation. I agree with the care provided, treatment rendered, disposition and follow-up plan. Reporting discomfort of the chest with associated with swallowing for recent past surgery. Currently lungs are clear minimal epigastric tenderness no peritoneal signs  Currently lungs are clear minimal epigastric tenderness no peritoneal signs seen for recent discomfort of the chest associated with swallowing seen bypass surgery. Reporting      Critical Care     Erick Stanton M.D.   Attending Emergency  Physician              Julia Haro MD  06/13/19 [id number]

## 2019-06-15 LAB
CULTURE: ABNORMAL
Lab: ABNORMAL
SPECIMEN DESCRIPTION: ABNORMAL

## 2019-06-15 NOTE — OP NOTE
[address] [facility] ENDOSCOPY  [address] [facility] [address]  Dept: [phone]  Loc: [phone]    6/14/2019    Preoperative Diagnosis: Dysphagia    Postoperative Diagnosis: Dysphagia    Procedure: Esophagogastroduodenoscopy    Surgeon: Peggy Yun DO    Ast: Jonnathan    Findings: No ulcer, no stricture, normal bariatric anatomy    Anesthesia: MAC, See Anesthesia Records    Specimen:    None    EBL: None    Operative Narrative: The risks and benefits of the procedure were explained to the patient in detail, including but not limited to: bleeding, infection, need for further surgery, damage to surrounding structures and perforation. The patient consented with the procedure. The patient was taken to the endoscopic suite and placed in lateral position. Oxygen was administered via nasal cannula and a mouth guard placed. MAC was administered via the anesthesia team.  The endoscope was then advanced into the oropharynx and passed into the esophagus under direct visualization. The scope was further advanced under direct visualization into the esophageal lumen and down into the gastric pouch. Evidence of stricture was not noted. The scope was advanced past the anastomosis and the antione limb surveyed. The antione limb appeared patent and without signs of ischemia. The scope was withdrawn and the blind pouch surveyed, without lesion. There was no ulcer. The gastric pouch normal, the esophagus normal. The scope withdrawn.

## 2019-06-18 NOTE — DISCHARGE SUMMARY
Surgery Discharge Summary     Patient Identification  Angelito Diaz is a 41 y.o. female. :  1980  Admit Date:  2019    Discharge date:   2019  4:10 PM                                   Disposition: home    Discharge Diagnoses:   Patient Active Problem List   Diagnosis    Chronic migraine    Neuropathy    Morbid obesity with BMI of 50.0-59.9, adult ([facility] [address]    Vitamin D deficiency    Antral gastritis    TINO (obstructive sleep apnea)    Status post gastric bypass for obesity    Epigastric pain    Esophageal dysphagia     UTI found on admission     Consults: none    Surgery: EGD     Patient Instructions: Activity: no heavy lifting, pushing, pulling for 6 weeks, no driving for 2 weeks or while on analgesics  Diet: bariatric liquid diet   Follow-up with Dr. Amy Bee as scheduled     See pre-printed instructions in chart and given to patient upon discharge.     Discharge Medications:      Nabil Luna   Home Medication Instructions [id number]    Printed on:19 3791   Medication Information                      acetaminophen (TYLENOL) 325 MG tablet  Take 2 tablets by mouth every 6 hours as needed for Pain             gabapentin (NEURONTIN) 100 MG capsule  take 1 capsule by mouth three times a day             lidocaine (LIDODERM) 5 %  Place 1 patch onto the skin daily 12 hours on, 12 hours off.             nitrofurantoin, macrocrystal-monohydrate, (MACROBID) 100 MG capsule  Take 1 capsule by mouth every 12 hours for 5 days             pantoprazole (PROTONIX) 40 MG tablet  Take 1 tablet by mouth daily             promethazine (PHENERGAN) 12.5 MG tablet  Take 1 tablet by mouth every 6 hours as needed for Nausea             sucralfate (CARAFATE) 1 GM/10ML suspension  Take 10 mLs by mouth 4 times daily             topiramate (TOPAMAX) 50 MG tablet  take 1/2 tablet by mouth twice a day for 3 days then take 1 tablet by mouth twice a day                  HPI and Hospital Course:     Patient has PMH including robotic assisted antione en y bypass. Recently she has continued abdominal pain. The day of admission she had been seen in clinic and scheduled for an elective EGD. She returned home and ate soup which caused her pain to become much worse. She was admitted for pain control and IVF. On admission she was found to have a UTI and was started on antibiotics. The EGD was completed which showed normal anatomy without evidence of ulcer. Post procedure, her diet was advanced. At time of discharge, she was tolerating PO well, pain was well controlled, nausea was controlled, she was ambulating on her own accord and voiding. Patient is stable for discharge.      Jackie Luong DO

## 2019-06-21 ENCOUNTER — OFFICE VISIT (OUTPATIENT)
Dept: INTERNAL MEDICINE | Age: 39
End: 2019-06-21
Payer: MEDICARE

## 2019-06-21 VITALS
SYSTOLIC BLOOD PRESSURE: 118 MMHG | TEMPERATURE: 97.9 F | HEIGHT: 58 IN | HEART RATE: 84 BPM | DIASTOLIC BLOOD PRESSURE: 67 MMHG | BODY MASS INDEX: 45.84 KG/M2 | OXYGEN SATURATION: 98 % | WEIGHT: 218.4 LBS

## 2019-06-21 DIAGNOSIS — G62.9 NEUROPATHY: ICD-10-CM

## 2019-06-21 DIAGNOSIS — G47.33 OSA (OBSTRUCTIVE SLEEP APNEA): Primary | ICD-10-CM

## 2019-06-21 DIAGNOSIS — R09.81 NASAL CONGESTION: ICD-10-CM

## 2019-06-21 DIAGNOSIS — R05.2 SUBACUTE COUGH: ICD-10-CM

## 2019-06-21 DIAGNOSIS — G25.81 RESTLESS LEG SYNDROME: ICD-10-CM

## 2019-06-21 DIAGNOSIS — Z98.84 STATUS POST GASTRIC BYPASS FOR OBESITY: ICD-10-CM

## 2019-06-21 PROCEDURE — 1036F TOBACCO NON-USER: CPT | Performed by: STUDENT IN AN ORGANIZED HEALTH CARE EDUCATION/TRAINING PROGRAM

## 2019-06-21 PROCEDURE — 99213 OFFICE O/P EST LOW 20 MIN: CPT | Performed by: STUDENT IN AN ORGANIZED HEALTH CARE EDUCATION/TRAINING PROGRAM

## 2019-06-21 PROCEDURE — G8427 DOCREV CUR MEDS BY ELIG CLIN: HCPCS | Performed by: STUDENT IN AN ORGANIZED HEALTH CARE EDUCATION/TRAINING PROGRAM

## 2019-06-21 PROCEDURE — G8417 CALC BMI ABV UP PARAM F/U: HCPCS | Performed by: STUDENT IN AN ORGANIZED HEALTH CARE EDUCATION/TRAINING PROGRAM

## 2019-06-21 PROCEDURE — 99211 OFF/OP EST MAY X REQ PHY/QHP: CPT | Performed by: INTERNAL MEDICINE

## 2019-06-21 RX ORDER — AZELASTINE HYDROCHLORIDE, FLUTICASONE PROPIONATE 137; 50 UG/1; UG/1
1 SPRAY, METERED NASAL 2 TIMES DAILY
Qty: 1 BOTTLE | Refills: 3 | Status: SHIPPED | OUTPATIENT
Start: 2019-06-21 | End: 2019-09-13

## 2019-06-21 RX ORDER — CYANOCOBALAMIN 1000 UG/ML
1000 INJECTION INTRAMUSCULAR; SUBCUTANEOUS ONCE
Qty: 1 ML | Refills: 0 | Status: SHIPPED | OUTPATIENT
Start: 2019-06-21 | End: 2019-08-07 | Stop reason: ALTCHOICE

## 2019-06-21 RX ORDER — ROPINIROLE 0.5 MG/1
0.5 TABLET, FILM COATED ORAL 3 TIMES DAILY
Qty: 90 TABLET | Refills: 3 | Status: SHIPPED | OUTPATIENT
Start: 2019-06-21 | End: 2019-08-07

## 2019-06-21 NOTE — PROGRESS NOTES
[facility]    Progress Note    Date of patient's visit: 6/21/2019  YOB: 1980  Patient's Name:  Angelito Diaz    Patient Care Team:  Willam Molina MD as PCP - Douglas Romero MD as PCP - [facility] Empaneled Provider  Emely Carnes MD as Consulting Physician (Pulmonology)    REASON FOR VISIT: Routine outpatient follow     HISTORY OF PRESENT ILLNESS:    History was obtained from the patient. Angelito Diaz is a 41 y.o. is here for a routine outpatient follow-up visit. The patient underwent laparoscopic Sukhjinder-en-Y  gastric bypass robotic assisted surgery on 4/1/2019 for morbid obesity, BMI 51kg/m2    The patient was having dysphagia, nausea and emesis, abdominal pain post surgery. The patient was seen by her bariatric surgery recently, she underwent EGD which showed no ulcer, no stricture, normal bariatric anatomy. Patient was advised to take Phenergan before her meals. Patient reported that she has been taking Phenergan before her meals and that is helping her with her nausea and emesis. She is currently taking Protonix and multivitamins. Patient has history of neuropathy and she is on gabapentin 100 mg 3 times daily, she reported that she is not taking gabapentin anymore as she has occasional numbness and tingling of her feet but otherwise she feels better. The patient has history of chronic migraine for which she is on Topamax, patient reported that since the past 2 months she had only 2 episodes of migraine and she is not taking her Topamax. Patient is complaining of nasal congestion especially during the evening times, has postnasal drip and cough which is dry. This is been going on since the past 1 to 2 months. Patient tried Flonase, reported that it did not help with her nasal congestion. Patient has no history of any allergies, no new pets, no sick contacts at home. She denies any fever, chills, chest pain, shortness of breath. negative  Genito-Urinary: no dysuria, trouble voiding, or hematuria  Musculoskeletal: negative  Dermatological: negative    PHYSICAL EXAM:      Vitals:    06/21/19 1509   BP: 118/67   Pulse: 84   Temp: 97.9 °F (36.6 °C)   SpO2: 98%     General appearance - alert, well appearing, and in no distress  Mental status - alert, oriented to person, place, and time  Eyes - pupils equal and reactive, extraocular eye movements intact  Ears - bilateral TM's and external ear canals normal  Nose - normal and patent, no erythema, discharge or polyps  Mouth - mucous membranes moist, pharynx normal without lesions  Chest - clear to auscultation, no wheezes, rales or rhonchi, symmetric air entry  Heart - normal rate, regular rhythm, normal S1, S2, no murmurs, rubs, clicks or gallops  Abdomen - soft, nontender, nondistended, no masses or organomegaly  Neurological - alert, oriented, normal speech, no focal findings or movement disorder noted  Musculoskeletal - no joint tenderness, deformity or swelling  Extremities - peripheral pulses normal, no pedal edema, no clubbing or cyanosis  Skin - normal coloration and turgor, no rashes, no suspicious skin lesions noted    LABORATORY FINDINGS:    CBC:  Lab Results   Component Value Date    WBC 6.0 06/13/2019    HGB 15.2 06/13/2019     06/13/2019     BMP:    Lab Results   Component Value Date     06/13/2019    K 4.1 06/13/2019     06/13/2019    CO2 20 06/13/2019    BUN 9 06/13/2019    CREATININE 0.55 06/13/2019    GLUCOSE 101 06/13/2019     HEMOGLOBIN A1C:   Lab Results   Component Value Date    LABA1C 5.2 10/24/2018     MICROALBUMIN URINE: No results found for: MICROALBUR  FASTING LIPID PANEL:  Lab Results   Component Value Date    CHOL 158 09/25/2018    HDL 35 (L) 09/25/2018    TRIG 239 (H) 09/25/2018     LIVER PROFILE:  Lab Results   Component Value Date    ALT 20 06/13/2019    AST 20 06/13/2019    PROT 6.6 06/13/2019    BILITOT 0.72 06/13/2019    BILIDIR 0.22 06/13/2019 LABALBU 3.7 2019      THYROID FUNCTION:   Lab Results   Component Value Date    TSH 2.08 10/24/2018      URINE ANALYSIS: No results found for: LABURIN  ASSESSMENT AND PLAN:        1. Status post gastric bypass for obesity      2. Neuropathy      3. Chronic migraine    4. Subacute cough      5. Nasal congestion    6. TINO (obstructive sleep apnea)            FOLLOW UP:       1. Emely George received counseling on the following healthy behaviors: {Health Counselin  2. Patient given educational materials - see patient instructions  3. Discussed use, benefit, and side effects of prescribed medications. Barriers to medication compliance addressed. All patient questions answered. Pt voiced understanding. 4.  Reviewed prior labs and health maintenance  5. Continue current medications, diet and exercise. Veena Guidry MD  Internal Medicine, PGY1  [address] [facility]  Attending Physician Statement  I have discussed the care of Eugenia Rossi, including pertinent history and exam findings,  with the resident. I have reviewed the key elements of all parts of the encounter with the resident. I agree with the assessment, plan and orders as documented by the resident. (GE Modifier)  Sxs post nasal drip; will modify congestion regimen to azelastine/fluticasone. Trial Requip for RLS sxs. Short term medical clinic followup planned.        2019, 3:55 PM

## 2019-06-21 NOTE — PATIENT INSTRUCTIONS
Return To Clinic 8/7/19. After Visit Summary given and reviewed. BB    It is very important for your care that you keep your appointment. If for some reason you are unable to keep your appointment it is equally important that you call our office at [phone] to cancel your appointment and reschedule. Failure to do so may result in your termination from our practice. LABORATORY INSTRUCTIONS    Your doctor has ordered blood or urine testing. You can get this testing done at the Lab located on the first floor of the [facility], or at any other [facility]. Please stop at Main Registration, before going to the lab, as you must be registered first.     Please get this lab done before your next visit. You may eat or drink before this test.    B12 injection printed as script, patient will take to pharmacy to get completed.

## 2019-07-10 ENCOUNTER — NURSE ONLY (OUTPATIENT)
Dept: BARIATRICS/WEIGHT MGMT | Age: 39
End: 2019-07-10

## 2019-07-15 DIAGNOSIS — K29.50 ANTRAL GASTRITIS: ICD-10-CM

## 2019-07-15 RX ORDER — PANTOPRAZOLE SODIUM 40 MG/1
40 TABLET, DELAYED RELEASE ORAL DAILY
Qty: 60 TABLET | Refills: 3 | Status: SHIPPED | OUTPATIENT
Start: 2019-07-15 | End: 2019-09-05 | Stop reason: SDUPTHER

## 2019-07-15 RX ORDER — PROMETHAZINE HYDROCHLORIDE 25 MG/1
25 TABLET ORAL EVERY 6 HOURS PRN
Qty: 30 TABLET | Refills: 0 | Status: SHIPPED | OUTPATIENT
Start: 2019-07-15 | End: 2019-09-05 | Stop reason: SDUPTHER

## 2019-08-07 ENCOUNTER — HOSPITAL ENCOUNTER (OUTPATIENT)
Age: 39
Setting detail: SPECIMEN
Discharge: HOME | End: 2019-08-07
Payer: MEDICARE

## 2019-08-07 ENCOUNTER — OFFICE VISIT (OUTPATIENT)
Dept: INTERNAL MEDICINE | Age: 39
End: 2019-08-07
Payer: MEDICARE

## 2019-08-07 VITALS
SYSTOLIC BLOOD PRESSURE: 120 MMHG | DIASTOLIC BLOOD PRESSURE: 77 MMHG | HEART RATE: 73 BPM | WEIGHT: 207 LBS | HEIGHT: 58 IN | BODY MASS INDEX: 43.45 KG/M2

## 2019-08-07 DIAGNOSIS — G47.33 OSA (OBSTRUCTIVE SLEEP APNEA): ICD-10-CM

## 2019-08-07 DIAGNOSIS — Z98.84 STATUS POST GASTRIC BYPASS FOR OBESITY: Primary | ICD-10-CM

## 2019-08-07 DIAGNOSIS — G25.81 RESTLESS LEG SYNDROME: ICD-10-CM

## 2019-08-07 DIAGNOSIS — K29.50 ANTRAL GASTRITIS: ICD-10-CM

## 2019-08-07 DIAGNOSIS — Z98.84 STATUS POST GASTRIC BYPASS FOR OBESITY: ICD-10-CM

## 2019-08-07 DIAGNOSIS — G62.9 NEUROPATHY: ICD-10-CM

## 2019-08-07 LAB
FERRITIN: 114 UG/L (ref 13–150)
IRON SATURATION: 18 % (ref 20–55)
IRON: 51 UG/DL (ref 37–145)
MAGNESIUM: 1.9 MG/DL (ref 1.6–2.6)
TOTAL IRON BINDING CAPACITY: 279 UG/DL (ref 250–450)
TSH SERPL DL<=0.05 MIU/L-ACNC: 2.15 MIU/L (ref 0.3–5)
UNSATURATED IRON BINDING CAPACITY: 228 UG/DL (ref 112–347)

## 2019-08-07 PROCEDURE — 84443 ASSAY THYROID STIM HORMONE: CPT

## 2019-08-07 PROCEDURE — 83540 ASSAY OF IRON: CPT

## 2019-08-07 PROCEDURE — G8427 DOCREV CUR MEDS BY ELIG CLIN: HCPCS | Performed by: STUDENT IN AN ORGANIZED HEALTH CARE EDUCATION/TRAINING PROGRAM

## 2019-08-07 PROCEDURE — 82746 ASSAY OF FOLIC ACID SERUM: CPT

## 2019-08-07 PROCEDURE — 83735 ASSAY OF MAGNESIUM: CPT

## 2019-08-07 PROCEDURE — 84590 ASSAY OF VITAMIN A: CPT

## 2019-08-07 PROCEDURE — 84630 ASSAY OF ZINC: CPT

## 2019-08-07 PROCEDURE — 6360000002 HC RX W HCPCS

## 2019-08-07 PROCEDURE — G8417 CALC BMI ABV UP PARAM F/U: HCPCS | Performed by: STUDENT IN AN ORGANIZED HEALTH CARE EDUCATION/TRAINING PROGRAM

## 2019-08-07 PROCEDURE — 82728 ASSAY OF FERRITIN: CPT

## 2019-08-07 PROCEDURE — 36415 COLL VENOUS BLD VENIPUNCTURE: CPT

## 2019-08-07 PROCEDURE — 1036F TOBACCO NON-USER: CPT | Performed by: STUDENT IN AN ORGANIZED HEALTH CARE EDUCATION/TRAINING PROGRAM

## 2019-08-07 PROCEDURE — 96372 THER/PROPH/DIAG INJ SC/IM: CPT

## 2019-08-07 PROCEDURE — 99211 OFF/OP EST MAY X REQ PHY/QHP: CPT | Performed by: INTERNAL MEDICINE

## 2019-08-07 PROCEDURE — 83550 IRON BINDING TEST: CPT

## 2019-08-07 PROCEDURE — 82607 VITAMIN B-12: CPT

## 2019-08-07 PROCEDURE — 84425 ASSAY OF VITAMIN B-1: CPT

## 2019-08-07 PROCEDURE — 99213 OFFICE O/P EST LOW 20 MIN: CPT | Performed by: STUDENT IN AN ORGANIZED HEALTH CARE EDUCATION/TRAINING PROGRAM

## 2019-08-07 RX ORDER — TOPIRAMATE 50 MG/1
50 TABLET, FILM COATED ORAL 2 TIMES DAILY
Qty: 60 TABLET | Refills: 0 | Status: SHIPPED | OUTPATIENT
Start: 2019-08-07 | End: 2019-08-08

## 2019-08-07 RX ORDER — GABAPENTIN 100 MG/1
CAPSULE ORAL
Qty: 90 CAPSULE | Refills: 2 | Status: CANCELLED | OUTPATIENT
Start: 2019-08-07 | End: 2019-09-07

## 2019-08-07 RX ORDER — CYANOCOBALAMIN 1000 UG/ML
1000 INJECTION INTRAMUSCULAR; SUBCUTANEOUS ONCE
Qty: 1 ML | Refills: 0 | Status: SHIPPED | OUTPATIENT
Start: 2019-08-07 | End: 2020-05-28

## 2019-08-07 RX ORDER — CYANOCOBALAMIN 1000 UG/ML
1000 INJECTION INTRAMUSCULAR; SUBCUTANEOUS ONCE
Status: COMPLETED | OUTPATIENT
Start: 2019-08-07 | End: 2019-08-07

## 2019-08-07 RX ADMIN — CYANOCOBALAMIN 1000 MCG: 1000 INJECTION INTRAMUSCULAR; SUBCUTANEOUS at 14:57

## 2019-08-07 NOTE — PROGRESS NOTES
HYPERTENSION visit     BP Readings from Last 3 Encounters:   06/21/19 118/67   06/14/19 126/67   06/14/19 (!) 109/45       LDL Cholesterol (mg/dL)   Date Value   09/25/2018 75     HDL (mg/dL)   Date Value   09/25/2018 35 (L)     BUN (mg/dL)   Date Value   06/13/2019 9     CREATININE (mg/dL)   Date Value   06/13/2019 0.55     Glucose (mg/dL)   Date Value   06/13/2019 101 (H)              Have you changed or started any medications since your last visit including any over-the-counter medicines, vitamins, or herbal medicines? no   Have you stopped taking any of your medications? Is so, why? -  no  Are you having any side effects from any of your medications? - no  How often do you miss doses of your medication? no      Have you seen any other physician or provider since your last visit?  no   Have you had any other diagnostic tests since your last visit?  no   Have you been seen in the emergency room and/or had an admission in a hospital since we last saw you?  no   Have you had your routine dental cleaning in the past 6 months?  no     Do you have an active MyChart account? If no, what is the barrier?   Yes    Patient Care Team:  Ricky Mar MD as PCP - Astrid Ocampo MD as Consulting Physician (Pulmonology)    Medical History Review  Past Medical, Family, and Social History reviewed and does not contribute to the patient presenting condition    Health Maintenance   Topic Date Due    Varicella Vaccine (1 of 2 - 13+ 2-dose series) 12/12/1994    Flu vaccine (1) 09/01/2019    DTaP/Tdap/Td vaccine (2 - Td) 06/30/2023    Cervical cancer screen  11/13/2023    HIV screen  Completed    Pneumococcal 0-64 years Vaccine  Aged Out
no dysuria, trouble voiding, or hematuria  Musculoskeletal: negative  Dermatological: negative    PHYSICAL EXAM:      Vitals:    08/07/19 1252   BP: 120/77   Pulse: 73     General appearance - alert, well appearing, and in no distress  Mental status - alert, oriented to person, place, and time  Eyes - pupils equal and reactive, extraocular eye movements intact  Ears - bilateral TM's and external ear canals normal  Nose - normal and patent, no erythema, discharge or polyps  Mouth - mucous membranes moist, pharynx normal without lesions  Neck - supple, no significant adenopathy  Chest - clear to auscultation, no wheezes, rales or rhonchi, symmetric air entry  Abdomen - soft, nontender, nondistended, no masses or organomegaly  Neurological - alert, oriented, normal speech, no focal findings or movement disorder noted  Musculoskeletal - no joint tenderness, deformity or swelling  Extremities - peripheral pulses normal, no pedal edema, no clubbing or cyanosis  Skin - normal coloration and turgor, no rashes, no suspicious skin lesions noted    LABORATORY FINDINGS:    CBC:  Lab Results   Component Value Date    WBC 6.0 06/13/2019    HGB 15.2 06/13/2019     06/13/2019     BMP:    Lab Results   Component Value Date     06/13/2019    K 4.1 06/13/2019     06/13/2019    CO2 20 06/13/2019    BUN 9 06/13/2019    CREATININE 0.55 06/13/2019    GLUCOSE 101 06/13/2019     HEMOGLOBIN A1C:   Lab Results   Component Value Date    LABA1C 5.2 10/24/2018     MICROALBUMIN URINE: No results found for: MICROALBUR  FASTING LIPID PANEL:  Lab Results   Component Value Date    CHOL 158 09/25/2018    HDL 35 (L) 09/25/2018    TRIG 239 (H) 09/25/2018     LIVER PROFILE:  Lab Results   Component Value Date    ALT 20 06/13/2019    AST 20 06/13/2019    PROT 6.6 06/13/2019    BILITOT 0.72 06/13/2019    BILIDIR 0.22 06/13/2019    LABALBU 3.7 06/13/2019      THYROID FUNCTION:   Lab Results   Component Value Date    TSH 2.08 10/24/2018

## 2019-08-08 LAB
FOLATE: 2.9 NG/ML
VITAMIN B-12: >2000 PG/ML (ref 232–1245)

## 2019-08-08 RX ORDER — TOPIRAMATE 50 MG/1
TABLET, FILM COATED ORAL
Qty: 60 TABLET | Refills: 2 | Status: SHIPPED | OUTPATIENT
Start: 2019-08-08 | End: 2019-08-19 | Stop reason: ALTCHOICE

## 2019-08-10 LAB — ZINC: 51 UG/DL (ref 60–120)

## 2019-08-11 LAB
RETINYL PALMITATE: <0.02 MG/L (ref 0–0.1)
VITAMIN A LEVEL: 0.37 MG/L (ref 0.3–1.2)
VITAMIN A, INTERP: NORMAL

## 2019-08-14 ENCOUNTER — NURSE ONLY (OUTPATIENT)
Dept: BARIATRICS/WEIGHT MGMT | Age: 39
End: 2019-08-14

## 2019-08-14 LAB — VITAMIN B1 WHOLE BLOOD: 111 NMOL/L (ref 70–180)

## 2019-08-19 ENCOUNTER — OFFICE VISIT (OUTPATIENT)
Dept: BARIATRICS/WEIGHT MGMT | Age: 39
End: 2019-08-19
Payer: MEDICARE

## 2019-08-19 VITALS
DIASTOLIC BLOOD PRESSURE: 78 MMHG | WEIGHT: 204 LBS | SYSTOLIC BLOOD PRESSURE: 128 MMHG | HEIGHT: 58 IN | HEART RATE: 80 BPM | BODY MASS INDEX: 42.82 KG/M2 | RESPIRATION RATE: 18 BRPM

## 2019-08-19 DIAGNOSIS — G25.81 RESTLESS LEG SYNDROME: ICD-10-CM

## 2019-08-19 DIAGNOSIS — G47.33 OSA (OBSTRUCTIVE SLEEP APNEA): Primary | ICD-10-CM

## 2019-08-19 DIAGNOSIS — E66.01 OBESITY, CLASS III, BMI 40-49.9 (MORBID OBESITY) (HCC): ICD-10-CM

## 2019-08-19 DIAGNOSIS — E60 LOW ZINC LEVEL: ICD-10-CM

## 2019-08-19 DIAGNOSIS — Z98.84 STATUS POST GASTRIC BYPASS FOR OBESITY: ICD-10-CM

## 2019-08-19 DIAGNOSIS — G62.9 NEUROPATHY: ICD-10-CM

## 2019-08-19 DIAGNOSIS — K21.9 GASTROESOPHAGEAL REFLUX DISEASE WITHOUT ESOPHAGITIS: ICD-10-CM

## 2019-08-19 DIAGNOSIS — E53.8 FOLATE DEFICIENCY: ICD-10-CM

## 2019-08-19 PROBLEM — R10.13 EPIGASTRIC PAIN: Status: RESOLVED | Noted: 2019-06-13 | Resolved: 2019-08-19

## 2019-08-19 PROBLEM — R05.2 SUBACUTE COUGH: Status: RESOLVED | Noted: 2019-06-21 | Resolved: 2019-08-19

## 2019-08-19 PROBLEM — R09.81 NASAL CONGESTION: Status: RESOLVED | Noted: 2019-06-21 | Resolved: 2019-08-19

## 2019-08-19 PROCEDURE — 1036F TOBACCO NON-USER: CPT | Performed by: NURSE PRACTITIONER

## 2019-08-19 PROCEDURE — G8427 DOCREV CUR MEDS BY ELIG CLIN: HCPCS | Performed by: NURSE PRACTITIONER

## 2019-08-19 PROCEDURE — 99213 OFFICE O/P EST LOW 20 MIN: CPT | Performed by: NURSE PRACTITIONER

## 2019-08-19 PROCEDURE — G8417 CALC BMI ABV UP PARAM F/U: HCPCS | Performed by: NURSE PRACTITIONER

## 2019-08-19 RX ORDER — GABAPENTIN 100 MG/1
100 CAPSULE ORAL 4 TIMES DAILY
COMMUNITY
End: 2019-09-13

## 2019-08-19 RX ORDER — FOLIC ACID 1 MG/1
1 TABLET ORAL DAILY
Qty: 30 TABLET | Refills: 0 | Status: SHIPPED | OUTPATIENT
Start: 2019-08-19 | End: 2019-09-13

## 2019-08-30 ENCOUNTER — NURSE ONLY (OUTPATIENT)
Dept: BARIATRICS/WEIGHT MGMT | Age: 39
End: 2019-08-30

## 2019-08-30 ENCOUNTER — HOSPITAL ENCOUNTER (EMERGENCY)
Age: 39
Discharge: HOME | End: 2019-08-30
Attending: EMERGENCY MEDICINE
Payer: MEDICARE

## 2019-08-30 VITALS
RESPIRATION RATE: 17 BRPM | OXYGEN SATURATION: 96 % | HEART RATE: 92 BPM | SYSTOLIC BLOOD PRESSURE: 159 MMHG | HEIGHT: 58 IN | DIASTOLIC BLOOD PRESSURE: 95 MMHG | TEMPERATURE: 98.8 F | WEIGHT: 199 LBS | BODY MASS INDEX: 41.77 KG/M2

## 2019-08-30 VITALS — WEIGHT: 202 LBS | BODY MASS INDEX: 42.23 KG/M2

## 2019-08-30 DIAGNOSIS — R10.13 ABDOMINAL PAIN, EPIGASTRIC: Primary | ICD-10-CM

## 2019-08-30 LAB
ABSOLUTE EOS #: 0.1 K/UL (ref 0–0.44)
ABSOLUTE IMMATURE GRANULOCYTE: <0.03 K/UL (ref 0–0.3)
ABSOLUTE LYMPH #: 2.53 K/UL (ref 1.1–3.7)
ABSOLUTE MONO #: 0.49 K/UL (ref 0.1–1.2)
ALBUMIN SERPL-MCNC: 3.9 G/DL (ref 3.5–5.2)
ALBUMIN/GLOBULIN RATIO: 1.3 (ref 1–2.5)
ALP BLD-CCNC: 65 U/L (ref 35–104)
ALT SERPL-CCNC: 16 U/L (ref 5–33)
ANION GAP SERPL CALCULATED.3IONS-SCNC: 12 MMOL/L (ref 9–17)
AST SERPL-CCNC: 16 U/L
BASOPHILS # BLD: 0 % (ref 0–2)
BASOPHILS ABSOLUTE: 0.03 K/UL (ref 0–0.2)
BILIRUB SERPL-MCNC: 1.05 MG/DL (ref 0.3–1.2)
BUN BLDV-MCNC: 8 MG/DL (ref 6–20)
BUN/CREAT BLD: ABNORMAL (ref 9–20)
CALCIUM SERPL-MCNC: 8.8 MG/DL (ref 8.6–10.4)
CHLORIDE BLD-SCNC: 103 MMOL/L (ref 98–107)
CO2: 23 MMOL/L (ref 20–31)
CREAT SERPL-MCNC: 0.44 MG/DL (ref 0.5–0.9)
DIFFERENTIAL TYPE: NORMAL
EOSINOPHILS RELATIVE PERCENT: 1 % (ref 1–4)
GFR AFRICAN AMERICAN: >60 ML/MIN
GFR NON-AFRICAN AMERICAN: >60 ML/MIN
GFR SERPL CREATININE-BSD FRML MDRD: ABNORMAL ML/MIN/{1.73_M2}
GFR SERPL CREATININE-BSD FRML MDRD: ABNORMAL ML/MIN/{1.73_M2}
GLUCOSE BLD-MCNC: 92 MG/DL (ref 70–99)
HCG QUALITATIVE: NEGATIVE
HCT VFR BLD CALC: 43.5 % (ref 36.3–47.1)
HEMOGLOBIN: 14.2 G/DL (ref 11.9–15.1)
IMMATURE GRANULOCYTES: 0 %
LIPASE: 19 U/L (ref 13–60)
LYMPHOCYTES # BLD: 34 % (ref 24–43)
MCH RBC QN AUTO: 31.6 PG (ref 25.2–33.5)
MCHC RBC AUTO-ENTMCNC: 32.6 G/DL (ref 28.4–34.8)
MCV RBC AUTO: 96.7 FL (ref 82.6–102.9)
MONOCYTES # BLD: 7 % (ref 3–12)
NRBC AUTOMATED: 0 PER 100 WBC
PDW BLD-RTO: 13.7 % (ref 11.8–14.4)
PLATELET # BLD: NORMAL K/UL (ref 138–453)
PLATELET ESTIMATE: NORMAL
PLATELET, FLUORESCENCE: 174 K/UL (ref 138–453)
PLATELET, IMMATURE FRACTION: 12.6 % (ref 1.1–10.3)
PMV BLD AUTO: NORMAL FL (ref 8.1–13.5)
POTASSIUM SERPL-SCNC: 3.3 MMOL/L (ref 3.7–5.3)
RBC # BLD: 4.5 M/UL (ref 3.95–5.11)
RBC # BLD: NORMAL 10*6/UL
SEG NEUTROPHILS: 57 % (ref 36–65)
SEGMENTED NEUTROPHILS ABSOLUTE COUNT: 4.25 K/UL (ref 1.5–8.1)
SODIUM BLD-SCNC: 138 MMOL/L (ref 135–144)
TOTAL PROTEIN: 7 G/DL (ref 6.4–8.3)
WBC # BLD: 7.4 K/UL (ref 3.5–11.3)
WBC # BLD: NORMAL 10*3/UL

## 2019-08-30 PROCEDURE — 84703 CHORIONIC GONADOTROPIN ASSAY: CPT

## 2019-08-30 PROCEDURE — 85055 RETICULATED PLATELET ASSAY: CPT

## 2019-08-30 PROCEDURE — 83690 ASSAY OF LIPASE: CPT

## 2019-08-30 PROCEDURE — 6360000002 HC RX W HCPCS: Performed by: STUDENT IN AN ORGANIZED HEALTH CARE EDUCATION/TRAINING PROGRAM

## 2019-08-30 PROCEDURE — 96374 THER/PROPH/DIAG INJ IV PUSH: CPT

## 2019-08-30 PROCEDURE — 85025 COMPLETE CBC W/AUTO DIFF WBC: CPT

## 2019-08-30 PROCEDURE — 80053 COMPREHEN METABOLIC PANEL: CPT

## 2019-08-30 PROCEDURE — 99283 EMERGENCY DEPT VISIT LOW MDM: CPT

## 2019-08-30 RX ORDER — ONDANSETRON 2 MG/ML
4 INJECTION INTRAMUSCULAR; INTRAVENOUS EVERY 6 HOURS PRN
Status: DISCONTINUED | OUTPATIENT
Start: 2019-08-30 | End: 2019-08-31 | Stop reason: HOSPADM

## 2019-08-30 RX ADMIN — ONDANSETRON 4 MG: 2 INJECTION INTRAMUSCULAR; INTRAVENOUS at 21:13

## 2019-08-30 ASSESSMENT — PAIN SCALES - GENERAL: PAINLEVEL_OUTOF10: 7

## 2019-08-30 ASSESSMENT — PAIN DESCRIPTION - DESCRIPTORS: DESCRIPTORS: TIGHTNESS;SQUEEZING

## 2019-08-30 ASSESSMENT — PAIN DESCRIPTION - ORIENTATION: ORIENTATION: MID;UPPER

## 2019-08-30 ASSESSMENT — PAIN DESCRIPTION - LOCATION: LOCATION: ABDOMEN

## 2019-08-30 ASSESSMENT — ENCOUNTER SYMPTOMS
NAUSEA: 1
ABDOMINAL PAIN: 1
DIARRHEA: 0
VOMITING: 1
SHORTNESS OF BREATH: 0
CONSTIPATION: 0
BACK PAIN: 0

## 2019-08-30 ASSESSMENT — PAIN DESCRIPTION - PAIN TYPE: TYPE: ACUTE PAIN

## 2019-08-30 NOTE — PROGRESS NOTES
Medical Nutrition Therapy  Metabolic and Bariatric surgery  6 month follow up note         Pt reports: many concerns about if doing things the right way        Vitals:   Vitals:    08/30/19 1022   Weight: 202 lb (91.6 kg)      Body mass index is 42.23 kg/m². Labs reviewed:     Multivitamin/mineral intake:  Calcium intake:                Nutrition Assessment:   PES: Inadequate food and beverage intake r/t WLS as evidenced by loss of excess body weight 2lb in two weeks. Goals   60-80gm of protein  48-64oz of fluid  Vitamin adherance  Basic adherance to WLS behavious and this document has been scanned into the chart.        [x] met     []  Not met        Plan:  Provided a listening presence and feedback about what she is doing well F/u 9 months after surgery         Eduardo Watkins

## 2019-08-30 NOTE — LETTER
Visit Date: 8/30/2019    Patient: Isi Farias  YOB: 1980    Dear Dr. Link Nelson MD,           I had the pleasure of seeing Chiquis Vaughn in the office today for a 6 month follow up post gastric bypass. Her current Weight: 202 lb (91.6 kg),  her  Body mass index is 42.23 kg/m². Total weight loss since surgery 48 lbs. A general rule of thumb for our patients is to take two multivitamins with iron daily, and three 500mg calcium citrate daily.  these doses by two hours enhances absorption of nutrients. A bariatric multivitamin will provide adequate  B vitamins, iron, folate, zinc, copper, chromium, manganese, and selenium that your patient needs. Thank you for allowing myself and all the team members here to participate in the care of your patient. If you have any questions or concerns, please do not hesitate to call.       Sincerely,   Dr. Chuyita Delacruz, DO  [facility]  [address]  O: [phone]  F: [phone]

## 2019-09-03 DIAGNOSIS — K29.50 ANTRAL GASTRITIS: ICD-10-CM

## 2019-09-05 RX ORDER — PROMETHAZINE HYDROCHLORIDE 25 MG/1
25 TABLET ORAL EVERY 6 HOURS PRN
Qty: 30 TABLET | Refills: 0 | Status: ON HOLD | OUTPATIENT
Start: 2019-09-05 | End: 2019-10-07 | Stop reason: SDUPTHER

## 2019-09-05 RX ORDER — PANTOPRAZOLE SODIUM 40 MG/1
40 TABLET, DELAYED RELEASE ORAL DAILY
Qty: 60 TABLET | Refills: 3 | Status: SHIPPED | OUTPATIENT
Start: 2019-09-05 | End: 2019-11-01 | Stop reason: SDUPTHER

## 2019-09-06 ENCOUNTER — TELEPHONE (OUTPATIENT)
Dept: BARIATRICS/WEIGHT MGMT | Age: 39
End: 2019-09-06

## 2019-09-11 ENCOUNTER — NURSE ONLY (OUTPATIENT)
Dept: BARIATRICS/WEIGHT MGMT | Age: 39
End: 2019-09-11

## 2019-09-13 ENCOUNTER — OFFICE VISIT (OUTPATIENT)
Dept: BARIATRICS/WEIGHT MGMT | Age: 39
End: 2019-09-13
Payer: MEDICARE

## 2019-09-13 VITALS
HEIGHT: 59 IN | WEIGHT: 195 LBS | SYSTOLIC BLOOD PRESSURE: 110 MMHG | DIASTOLIC BLOOD PRESSURE: 68 MMHG | BODY MASS INDEX: 39.31 KG/M2 | HEART RATE: 60 BPM

## 2019-09-13 DIAGNOSIS — K21.9 GASTROESOPHAGEAL REFLUX DISEASE WITHOUT ESOPHAGITIS: ICD-10-CM

## 2019-09-13 DIAGNOSIS — E66.01 MORBID OBESITY (HCC): ICD-10-CM

## 2019-09-13 DIAGNOSIS — R10.13 EPIGASTRIC PAIN: Primary | ICD-10-CM

## 2019-09-13 DIAGNOSIS — R11.0 NAUSEA: ICD-10-CM

## 2019-09-13 DIAGNOSIS — Z98.84 STATUS POST GASTRIC BYPASS FOR OBESITY: ICD-10-CM

## 2019-09-13 PROCEDURE — G8417 CALC BMI ABV UP PARAM F/U: HCPCS | Performed by: NURSE PRACTITIONER

## 2019-09-13 PROCEDURE — 99213 OFFICE O/P EST LOW 20 MIN: CPT | Performed by: NURSE PRACTITIONER

## 2019-09-13 PROCEDURE — G8427 DOCREV CUR MEDS BY ELIG CLIN: HCPCS | Performed by: NURSE PRACTITIONER

## 2019-09-13 PROCEDURE — 1036F TOBACCO NON-USER: CPT | Performed by: NURSE PRACTITIONER

## 2019-09-13 RX ORDER — SUCRALFATE 1 G/1
1 TABLET ORAL 4 TIMES DAILY
Qty: 120 TABLET | Refills: 3 | Status: ON HOLD | OUTPATIENT
Start: 2019-09-13 | End: 2019-10-07 | Stop reason: ALTCHOICE

## 2019-09-13 RX ORDER — CALCIUM CARBONATE 200(500)MG
1 TABLET,CHEWABLE ORAL 2 TIMES DAILY
COMMUNITY
End: 2020-05-15 | Stop reason: SDUPTHER

## 2019-09-13 RX ORDER — RANITIDINE 150 MG/1
150 TABLET ORAL NIGHTLY
Qty: 30 TABLET | Refills: 3 | Status: SHIPPED | OUTPATIENT
Start: 2019-09-13 | End: 2020-01-03

## 2019-09-13 NOTE — PROGRESS NOTES
bypass.        Prescriptions this encounter:  Orders Placed This Encounter   Medications    sucralfate (CARAFATE) 1 GM tablet     Sig: Take 1 tablet by mouth 4 times daily     Dispense:  120 tablet     Refill:  3    ranitidine (ZANTAC) 150 MG tablet     Sig: Take 1 tablet by mouth nightly     Dispense:  30 tablet     Refill:  3       Electronically signed by:  Padilla Long CNP

## 2019-09-16 ENCOUNTER — HOSPITAL ENCOUNTER (OUTPATIENT)
Dept: ULTRASOUND IMAGING | Age: 39
Discharge: HOME | End: 2019-09-18
Payer: MEDICARE

## 2019-09-16 ENCOUNTER — TELEPHONE (OUTPATIENT)
Dept: BARIATRICS/WEIGHT MGMT | Age: 39
End: 2019-09-16

## 2019-09-16 DIAGNOSIS — K21.9 GASTROESOPHAGEAL REFLUX DISEASE WITHOUT ESOPHAGITIS: ICD-10-CM

## 2019-09-16 DIAGNOSIS — R11.0 NAUSEA: ICD-10-CM

## 2019-09-16 DIAGNOSIS — Z98.84 STATUS POST GASTRIC BYPASS FOR OBESITY: ICD-10-CM

## 2019-09-16 DIAGNOSIS — E66.01 MORBID OBESITY (HCC): ICD-10-CM

## 2019-09-16 DIAGNOSIS — R10.13 EPIGASTRIC PAIN: ICD-10-CM

## 2019-09-16 PROCEDURE — 76705 ECHO EXAM OF ABDOMEN: CPT

## 2019-09-16 NOTE — TELEPHONE ENCOUNTER
Next Visit Date:  Visit date not found    Patient Surgery Date  4-1-19    Type of  Surgery  Bypass     Patient calls complaining of  Would like to know if she should continue the carafate.  Please advise, states it not doing anything     Onset: 3 day(s) ago  Timing: constant, intermittent      Is it ok to leave a message if we call back yes

## 2019-09-18 ENCOUNTER — OFFICE VISIT (OUTPATIENT)
Dept: INTERNAL MEDICINE | Age: 39
End: 2019-09-18
Payer: MEDICARE

## 2019-09-18 ENCOUNTER — TELEPHONE (OUTPATIENT)
Dept: BARIATRICS/WEIGHT MGMT | Age: 39
End: 2019-09-18

## 2019-09-18 VITALS
SYSTOLIC BLOOD PRESSURE: 123 MMHG | HEART RATE: 80 BPM | BODY MASS INDEX: 41.85 KG/M2 | DIASTOLIC BLOOD PRESSURE: 80 MMHG | WEIGHT: 194 LBS | HEIGHT: 57 IN

## 2019-09-18 DIAGNOSIS — E66.01 OBESITY, CLASS III, BMI 40-49.9 (MORBID OBESITY) (HCC): ICD-10-CM

## 2019-09-18 DIAGNOSIS — Z23 NEEDS FLU SHOT: ICD-10-CM

## 2019-09-18 DIAGNOSIS — E60 LOW ZINC LEVEL: ICD-10-CM

## 2019-09-18 DIAGNOSIS — E53.8 FOLATE DEFICIENCY: ICD-10-CM

## 2019-09-18 DIAGNOSIS — R60.0 LOWER EXTREMITY EDEMA: ICD-10-CM

## 2019-09-18 DIAGNOSIS — G47.33 OSA (OBSTRUCTIVE SLEEP APNEA): ICD-10-CM

## 2019-09-18 DIAGNOSIS — G25.81 RESTLESS LEG SYNDROME: Primary | ICD-10-CM

## 2019-09-18 DIAGNOSIS — L74.0 SWEAT RASH: ICD-10-CM

## 2019-09-18 DIAGNOSIS — Z98.84 STATUS POST GASTRIC BYPASS FOR OBESITY: ICD-10-CM

## 2019-09-18 PROCEDURE — 1036F TOBACCO NON-USER: CPT | Performed by: STUDENT IN AN ORGANIZED HEALTH CARE EDUCATION/TRAINING PROGRAM

## 2019-09-18 PROCEDURE — 90471 IMMUNIZATION ADMIN: CPT | Performed by: STUDENT IN AN ORGANIZED HEALTH CARE EDUCATION/TRAINING PROGRAM

## 2019-09-18 PROCEDURE — G0008 ADMIN INFLUENZA VIRUS VAC: HCPCS

## 2019-09-18 PROCEDURE — 99211 OFF/OP EST MAY X REQ PHY/QHP: CPT | Performed by: INTERNAL MEDICINE

## 2019-09-18 PROCEDURE — G8427 DOCREV CUR MEDS BY ELIG CLIN: HCPCS | Performed by: STUDENT IN AN ORGANIZED HEALTH CARE EDUCATION/TRAINING PROGRAM

## 2019-09-18 PROCEDURE — 99213 OFFICE O/P EST LOW 20 MIN: CPT | Performed by: STUDENT IN AN ORGANIZED HEALTH CARE EDUCATION/TRAINING PROGRAM

## 2019-09-18 PROCEDURE — G8417 CALC BMI ABV UP PARAM F/U: HCPCS | Performed by: STUDENT IN AN ORGANIZED HEALTH CARE EDUCATION/TRAINING PROGRAM

## 2019-09-18 RX ORDER — HYDROCHLOROTHIAZIDE 12.5 MG/1
12.5 TABLET ORAL EVERY MORNING
Qty: 30 TABLET | Refills: 5 | Status: SHIPPED | OUTPATIENT
Start: 2019-09-18 | End: 2020-05-15 | Stop reason: SDUPTHER

## 2019-09-18 RX ORDER — PREGABALIN 50 MG/1
50 CAPSULE ORAL EVERY EVENING
Qty: 30 CAPSULE | Refills: 0 | Status: SHIPPED | OUTPATIENT
Start: 2019-09-18 | End: 2020-05-28

## 2019-09-18 RX ORDER — CALCIUM CARB/VITAMIN D3/VIT K1 500-100-40
TABLET,CHEWABLE ORAL
Qty: 1 APPLICATOR | Refills: 5 | Status: ON HOLD | OUTPATIENT
Start: 2019-09-18 | End: 2019-10-07 | Stop reason: SINTOL

## 2019-09-18 RX ORDER — FERROUS SULFATE 325(65) MG
325 TABLET ORAL 2 TIMES DAILY
Qty: 180 TABLET | Refills: 1 | Status: SHIPPED | OUTPATIENT
Start: 2019-09-18 | End: 2020-01-03

## 2019-09-18 RX ORDER — PETROLATUM 42 G/100G
OINTMENT TOPICAL
Qty: 3 TUBE | Refills: 3 | Status: CANCELLED | OUTPATIENT
Start: 2019-09-18

## 2019-09-18 NOTE — PROGRESS NOTES
Visit Information    Have you changed or started any medications since your last visit including any over-the-counter medicines, vitamins, or herbal medicines? no   Have you stopped taking any of your medications? Is so, why? -  no  Are you having any side effects from any of your medications? - no    Have you seen any other physician or provider since your last visit? yes -    Have you had any other diagnostic tests since your last visit? yes - US   Have you been seen in the emergency room and/or had an admission in a hospital since we last saw you?  yes - ER   Have you had your routine dental cleaning in the past 6 months?  no     Do you have an active MyChart account? If no, what is the barrier?   Yes    Patient Care Team:  Elizabeth Leger MD as PCP - Marni Reeves MD as Consulting Physician (Pulmonology)    Medical History Review  Past Medical, Family, and Social History reviewed and does contribute to the patient presenting condition    Health Maintenance   Topic Date Due    Varicella Vaccine (1 of 2 - 13+ 2-dose series) 12/12/1994    Flu vaccine (1) 09/01/2019    DTaP/Tdap/Td vaccine (2 - Td) 06/30/2023    Cervical cancer screen  11/13/2023    HIV screen  Completed    Pneumococcal 0-64 years Vaccine  Aged Out
for: LABURIN  ASSESSMENT AND PLAN:    1. Restless leg syndrome  Ferrous sulfate 325 mg Od, Lyrica 50 mg  OD     2. Obesity, Class III, BMI 40-49.9 (morbid obesity) ([address]      3. Folate deficiency  Multivitamin    4. Low zinc level  Multivitamin    5. Status post gastric bypass for obesity      6. TINO (obstructive sleep apnea)  On CPAP    7. Chronic migraine  Topomax 50 TID    8. Sweat rash  Antifungal powder    9. Cholelithiasis  Elective cholecystectomy on October 7, 2019. FOLLOW UP:       1. Kaylee Sanches received counseling on the following healthy behaviors: nutrition, exercise and medication adherence  2. Patient given educational materials - see patient instructions  3. Discussed use, benefit, and side effects of prescribed medications. Barriers to medication compliance addressed. All patient questions answered. Pt voiced understanding. 4.  Reviewed prior labs and health maintenance  5. Continue current medications, diet and exercise.     Marialuisa Bill MD  Internal Medicine, PGY2  [facility]

## 2019-09-18 NOTE — PATIENT INSTRUCTIONS
Medications e-scribe to pharmacy of pt's choice. Printed script for Lyrica given to pt. An After Visit Summary was printed and given to the patient.  CB

## 2019-10-07 ENCOUNTER — ANESTHESIA EVENT (OUTPATIENT)
Dept: OPERATING ROOM | Age: 39
End: 2019-10-07
Payer: MEDICARE

## 2019-10-07 ENCOUNTER — HOSPITAL ENCOUNTER (OUTPATIENT)
Age: 39
Setting detail: OUTPATIENT SURGERY
Discharge: HOME | End: 2019-10-07
Attending: SURGERY | Admitting: SURGERY
Payer: MEDICARE

## 2019-10-07 ENCOUNTER — ANESTHESIA (OUTPATIENT)
Dept: OPERATING ROOM | Age: 39
End: 2019-10-07
Payer: MEDICARE

## 2019-10-07 VITALS — OXYGEN SATURATION: 93 % | SYSTOLIC BLOOD PRESSURE: 123 MMHG | DIASTOLIC BLOOD PRESSURE: 91 MMHG | TEMPERATURE: 97.5 F

## 2019-10-07 VITALS
WEIGHT: 191.5 LBS | RESPIRATION RATE: 16 BRPM | OXYGEN SATURATION: 97 % | HEIGHT: 57 IN | DIASTOLIC BLOOD PRESSURE: 94 MMHG | SYSTOLIC BLOOD PRESSURE: 158 MMHG | TEMPERATURE: 98.2 F | BODY MASS INDEX: 41.31 KG/M2 | HEART RATE: 76 BPM

## 2019-10-07 DIAGNOSIS — G89.18 ACUTE POSTOPERATIVE PAIN: Primary | ICD-10-CM

## 2019-10-07 LAB
POC INR: 1
POC POTASSIUM: 3.5 MMOL/L (ref 3.5–4.5)
PROTHROMBIN TIME, POC: 12.2 SEC (ref 10.4–14.2)

## 2019-10-07 PROCEDURE — S2900 ROBOTIC SURGICAL SYSTEM: HCPCS | Performed by: SURGERY

## 2019-10-07 PROCEDURE — 2580000003 HC RX 258: Performed by: SURGERY

## 2019-10-07 PROCEDURE — 7100000000 HC PACU RECOVERY - FIRST 15 MIN: Performed by: SURGERY

## 2019-10-07 PROCEDURE — 85610 PROTHROMBIN TIME: CPT

## 2019-10-07 PROCEDURE — 88304 TISSUE EXAM BY PATHOLOGIST: CPT

## 2019-10-07 PROCEDURE — 2780000010 HC IMPLANT OTHER: Performed by: SURGERY

## 2019-10-07 PROCEDURE — 84132 ASSAY OF SERUM POTASSIUM: CPT

## 2019-10-07 PROCEDURE — 2500000003 HC RX 250 WO HCPCS: Performed by: ANESTHESIOLOGY

## 2019-10-07 PROCEDURE — 7100000040 HC SPAR PHASE II RECOVERY - FIRST 15 MIN: Performed by: SURGERY

## 2019-10-07 PROCEDURE — 7100000001 HC PACU RECOVERY - ADDTL 15 MIN: Performed by: SURGERY

## 2019-10-07 PROCEDURE — 3700000000 HC ANESTHESIA ATTENDED CARE: Performed by: SURGERY

## 2019-10-07 PROCEDURE — 3600000019 HC SURGERY ROBOT ADDTL 15MIN: Performed by: SURGERY

## 2019-10-07 PROCEDURE — 6360000002 HC RX W HCPCS: Performed by: ANESTHESIOLOGY

## 2019-10-07 PROCEDURE — 2709999900 HC NON-CHARGEABLE SUPPLY: Performed by: SURGERY

## 2019-10-07 PROCEDURE — 47562 LAPAROSCOPIC CHOLECYSTECTOMY: CPT | Performed by: SURGERY

## 2019-10-07 PROCEDURE — 7100000041 HC SPAR PHASE II RECOVERY - ADDTL 15 MIN: Performed by: SURGERY

## 2019-10-07 PROCEDURE — 3600000009 HC SURGERY ROBOT BASE: Performed by: SURGERY

## 2019-10-07 PROCEDURE — 2500000003 HC RX 250 WO HCPCS: Performed by: SURGERY

## 2019-10-07 PROCEDURE — 2720000010 HC SURG SUPPLY STERILE: Performed by: SURGERY

## 2019-10-07 PROCEDURE — 6360000002 HC RX W HCPCS: Performed by: SURGERY

## 2019-10-07 PROCEDURE — 3700000001 HC ADD 15 MINUTES (ANESTHESIA): Performed by: SURGERY

## 2019-10-07 PROCEDURE — 64488 TAP BLOCK BI INJECTION: CPT | Performed by: ANESTHESIOLOGY

## 2019-10-07 PROCEDURE — 2580000003 HC RX 258: Performed by: ANESTHESIOLOGY

## 2019-10-07 PROCEDURE — 6360000002 HC RX W HCPCS: Performed by: NURSE ANESTHETIST, CERTIFIED REGISTERED

## 2019-10-07 PROCEDURE — 2500000003 HC RX 250 WO HCPCS: Performed by: NURSE ANESTHETIST, CERTIFIED REGISTERED

## 2019-10-07 PROCEDURE — 6360000002 HC RX W HCPCS

## 2019-10-07 DEVICE — Z DUP USE 2641840 CLIP INT L POLYMER LOK LIG HEM O LOK: Type: IMPLANTABLE DEVICE | Site: ABDOMEN | Status: FUNCTIONAL

## 2019-10-07 RX ORDER — SODIUM CHLORIDE 0.9 % (FLUSH) 0.9 %
10 SYRINGE (ML) INJECTION EVERY 12 HOURS SCHEDULED
Status: DISCONTINUED | OUTPATIENT
Start: 2019-10-07 | End: 2019-10-07 | Stop reason: HOSPADM

## 2019-10-07 RX ORDER — BUPIVACAINE HYDROCHLORIDE 5 MG/ML
INJECTION, SOLUTION EPIDURAL; INTRACAUDAL
Status: COMPLETED | OUTPATIENT
Start: 2019-10-07 | End: 2019-10-07

## 2019-10-07 RX ORDER — FENTANYL CITRATE 50 UG/ML
100 INJECTION, SOLUTION INTRAMUSCULAR; INTRAVENOUS ONCE
Status: COMPLETED | OUTPATIENT
Start: 2019-10-07 | End: 2019-10-07

## 2019-10-07 RX ORDER — SODIUM CHLORIDE 0.9 % (FLUSH) 0.9 %
10 SYRINGE (ML) INJECTION PRN
Status: DISCONTINUED | OUTPATIENT
Start: 2019-10-07 | End: 2019-10-07 | Stop reason: HOSPADM

## 2019-10-07 RX ORDER — ONDANSETRON 2 MG/ML
INJECTION INTRAMUSCULAR; INTRAVENOUS PRN
Status: DISCONTINUED | OUTPATIENT
Start: 2019-10-07 | End: 2019-10-07 | Stop reason: SDUPTHER

## 2019-10-07 RX ORDER — FENTANYL CITRATE 50 UG/ML
INJECTION, SOLUTION INTRAMUSCULAR; INTRAVENOUS PRN
Status: DISCONTINUED | OUTPATIENT
Start: 2019-10-07 | End: 2019-10-07 | Stop reason: SDUPTHER

## 2019-10-07 RX ORDER — FENTANYL CITRATE 50 UG/ML
25 INJECTION, SOLUTION INTRAMUSCULAR; INTRAVENOUS EVERY 5 MIN PRN
Status: DISCONTINUED | OUTPATIENT
Start: 2019-10-07 | End: 2019-10-07 | Stop reason: HOSPADM

## 2019-10-07 RX ORDER — MIDAZOLAM HYDROCHLORIDE 1 MG/ML
2 INJECTION INTRAMUSCULAR; INTRAVENOUS ONCE
Status: COMPLETED | OUTPATIENT
Start: 2019-10-07 | End: 2019-10-07

## 2019-10-07 RX ORDER — PROMETHAZINE HYDROCHLORIDE 25 MG/1
25 TABLET ORAL EVERY 6 HOURS PRN
Qty: 30 TABLET | Refills: 0 | Status: SHIPPED | OUTPATIENT
Start: 2019-10-07 | End: 2019-11-01 | Stop reason: SDUPTHER

## 2019-10-07 RX ORDER — GLYCOPYRROLATE 1 MG/5 ML
SYRINGE (ML) INTRAVENOUS PRN
Status: DISCONTINUED | OUTPATIENT
Start: 2019-10-07 | End: 2019-10-07 | Stop reason: SDUPTHER

## 2019-10-07 RX ORDER — OXYCODONE HYDROCHLORIDE AND ACETAMINOPHEN 5; 325 MG/1; MG/1
1 TABLET ORAL EVERY 6 HOURS PRN
Qty: 28 TABLET | Refills: 0 | Status: SHIPPED | OUTPATIENT
Start: 2019-10-07 | End: 2019-10-14

## 2019-10-07 RX ORDER — MIDAZOLAM HYDROCHLORIDE 1 MG/ML
INJECTION INTRAMUSCULAR; INTRAVENOUS
Status: COMPLETED
Start: 2019-10-07 | End: 2019-10-07

## 2019-10-07 RX ORDER — BUPIVACAINE HYDROCHLORIDE 5 MG/ML
30 INJECTION, SOLUTION EPIDURAL; INTRACAUDAL ONCE
Status: COMPLETED | OUTPATIENT
Start: 2019-10-07 | End: 2019-10-07

## 2019-10-07 RX ORDER — CEFAZOLIN SODIUM 1 G/3ML
INJECTION, POWDER, FOR SOLUTION INTRAMUSCULAR; INTRAVENOUS PRN
Status: DISCONTINUED | OUTPATIENT
Start: 2019-10-07 | End: 2019-10-07 | Stop reason: SDUPTHER

## 2019-10-07 RX ORDER — FENTANYL CITRATE 50 UG/ML
50 INJECTION, SOLUTION INTRAMUSCULAR; INTRAVENOUS EVERY 5 MIN PRN
Status: DISCONTINUED | OUTPATIENT
Start: 2019-10-07 | End: 2019-10-07 | Stop reason: HOSPADM

## 2019-10-07 RX ORDER — DEXAMETHASONE SODIUM PHOSPHATE 10 MG/ML
INJECTION INTRAMUSCULAR; INTRAVENOUS PRN
Status: DISCONTINUED | OUTPATIENT
Start: 2019-10-07 | End: 2019-10-07 | Stop reason: SDUPTHER

## 2019-10-07 RX ORDER — PROPOFOL 10 MG/ML
INJECTION, EMULSION INTRAVENOUS PRN
Status: DISCONTINUED | OUTPATIENT
Start: 2019-10-07 | End: 2019-10-07 | Stop reason: SDUPTHER

## 2019-10-07 RX ORDER — SODIUM CHLORIDE, SODIUM LACTATE, POTASSIUM CHLORIDE, CALCIUM CHLORIDE 600; 310; 30; 20 MG/100ML; MG/100ML; MG/100ML; MG/100ML
INJECTION, SOLUTION INTRAVENOUS CONTINUOUS
Status: DISCONTINUED | OUTPATIENT
Start: 2019-10-07 | End: 2019-10-07 | Stop reason: HOSPADM

## 2019-10-07 RX ORDER — HEPARIN SODIUM 5000 [USP'U]/ML
5000 INJECTION, SOLUTION INTRAVENOUS; SUBCUTANEOUS ONCE
Status: COMPLETED | OUTPATIENT
Start: 2019-10-07 | End: 2019-10-07

## 2019-10-07 RX ORDER — INDOCYANINE GREEN AND WATER 25 MG
5 KIT INJECTION ONCE
Status: COMPLETED | OUTPATIENT
Start: 2019-10-07 | End: 2019-10-07

## 2019-10-07 RX ORDER — LIDOCAINE HYDROCHLORIDE 10 MG/ML
INJECTION, SOLUTION EPIDURAL; INFILTRATION; INTRACAUDAL; PERINEURAL PRN
Status: DISCONTINUED | OUTPATIENT
Start: 2019-10-07 | End: 2019-10-07 | Stop reason: SDUPTHER

## 2019-10-07 RX ORDER — NEOSTIGMINE METHYLSULFATE 5 MG/5 ML
SYRINGE (ML) INTRAVENOUS PRN
Status: DISCONTINUED | OUTPATIENT
Start: 2019-10-07 | End: 2019-10-07 | Stop reason: SDUPTHER

## 2019-10-07 RX ORDER — ROCURONIUM BROMIDE 10 MG/ML
INJECTION, SOLUTION INTRAVENOUS PRN
Status: DISCONTINUED | OUTPATIENT
Start: 2019-10-07 | End: 2019-10-07 | Stop reason: SDUPTHER

## 2019-10-07 RX ORDER — DOCUSATE SODIUM 100 MG/1
100 CAPSULE, LIQUID FILLED ORAL 2 TIMES DAILY PRN
Qty: 20 CAPSULE | Refills: 0 | Status: SHIPPED | OUTPATIENT
Start: 2019-10-07 | End: 2019-10-07 | Stop reason: HOSPADM

## 2019-10-07 RX ORDER — MAGNESIUM HYDROXIDE 1200 MG/15ML
LIQUID ORAL CONTINUOUS PRN
Status: COMPLETED | OUTPATIENT
Start: 2019-10-07 | End: 2019-10-07

## 2019-10-07 RX ADMIN — BUPIVACAINE HYDROCHLORIDE 40 ML: 5 INJECTION, SOLUTION EPIDURAL; INTRACAUDAL; PERINEURAL at 11:42

## 2019-10-07 RX ADMIN — SODIUM CHLORIDE, POTASSIUM CHLORIDE, SODIUM LACTATE AND CALCIUM CHLORIDE: 600; 310; 30; 20 INJECTION, SOLUTION INTRAVENOUS at 12:58

## 2019-10-07 RX ADMIN — FENTANYL CITRATE 50 MCG: 50 INJECTION, SOLUTION INTRAMUSCULAR; INTRAVENOUS at 13:34

## 2019-10-07 RX ADMIN — FENTANYL CITRATE 50 MCG: 50 INJECTION, SOLUTION INTRAMUSCULAR; INTRAVENOUS at 15:20

## 2019-10-07 RX ADMIN — ROCURONIUM BROMIDE 50 MG: 10 INJECTION INTRAVENOUS at 13:03

## 2019-10-07 RX ADMIN — PHENYLEPHRINE HYDROCHLORIDE 100 MCG: 10 INJECTION INTRAVENOUS at 14:05

## 2019-10-07 RX ADMIN — PHENYLEPHRINE HYDROCHLORIDE 100 MCG: 10 INJECTION INTRAVENOUS at 14:19

## 2019-10-07 RX ADMIN — FENTANYL CITRATE 50 MCG: 50 INJECTION, SOLUTION INTRAMUSCULAR; INTRAVENOUS at 14:09

## 2019-10-07 RX ADMIN — CEFAZOLIN 2000 MG: 1 INJECTION, POWDER, FOR SOLUTION INTRAMUSCULAR; INTRAVENOUS at 13:07

## 2019-10-07 RX ADMIN — Medication 0.8 MG: at 14:28

## 2019-10-07 RX ADMIN — LIDOCAINE HYDROCHLORIDE 50 MG: 10 INJECTION, SOLUTION EPIDURAL; INFILTRATION; INTRACAUDAL; PERINEURAL at 13:03

## 2019-10-07 RX ADMIN — Medication 40 ML: at 11:25

## 2019-10-07 RX ADMIN — ONDANSETRON 4 MG: 2 INJECTION, SOLUTION INTRAMUSCULAR; INTRAVENOUS at 13:10

## 2019-10-07 RX ADMIN — SODIUM CHLORIDE, POTASSIUM CHLORIDE, SODIUM LACTATE AND CALCIUM CHLORIDE: 600; 310; 30; 20 INJECTION, SOLUTION INTRAVENOUS at 14:28

## 2019-10-07 RX ADMIN — DEXAMETHASONE SODIUM PHOSPHATE 10 MG: 10 INJECTION INTRAMUSCULAR; INTRAVENOUS at 13:10

## 2019-10-07 RX ADMIN — INDOCYANINE GREEN AND WATER 5 MG: KIT at 11:39

## 2019-10-07 RX ADMIN — FENTANYL CITRATE 100 MCG: 50 INJECTION INTRAMUSCULAR; INTRAVENOUS at 11:25

## 2019-10-07 RX ADMIN — FENTANYL CITRATE 100 MCG: 50 INJECTION, SOLUTION INTRAMUSCULAR; INTRAVENOUS at 13:03

## 2019-10-07 RX ADMIN — FENTANYL CITRATE 50 MCG: 50 INJECTION, SOLUTION INTRAMUSCULAR; INTRAVENOUS at 13:20

## 2019-10-07 RX ADMIN — FENTANYL CITRATE 50 MCG: 50 INJECTION, SOLUTION INTRAMUSCULAR; INTRAVENOUS at 15:15

## 2019-10-07 RX ADMIN — HEPARIN SODIUM 5000 UNITS: 5000 INJECTION INTRAVENOUS; SUBCUTANEOUS at 10:53

## 2019-10-07 RX ADMIN — MIDAZOLAM HYDROCHLORIDE 2 MG: 1 INJECTION, SOLUTION INTRAMUSCULAR; INTRAVENOUS at 11:25

## 2019-10-07 RX ADMIN — SODIUM CHLORIDE, POTASSIUM CHLORIDE, SODIUM LACTATE AND CALCIUM CHLORIDE: 600; 310; 30; 20 INJECTION, SOLUTION INTRAVENOUS at 10:53

## 2019-10-07 RX ADMIN — MIDAZOLAM HYDROCHLORIDE 2 MG: 1 INJECTION INTRAMUSCULAR; INTRAVENOUS at 11:25

## 2019-10-07 RX ADMIN — PROPOFOL 200 MG: 10 INJECTION, EMULSION INTRAVENOUS at 13:03

## 2019-10-07 RX ADMIN — Medication 4 MG: at 14:28

## 2019-10-07 ASSESSMENT — PULMONARY FUNCTION TESTS
PIF_VALUE: 25
PIF_VALUE: 16
PIF_VALUE: 25
PIF_VALUE: 24
PIF_VALUE: 17
PIF_VALUE: 25
PIF_VALUE: 2
PIF_VALUE: 25
PIF_VALUE: 2
PIF_VALUE: 1
PIF_VALUE: 25
PIF_VALUE: 17
PIF_VALUE: 22
PIF_VALUE: 25
PIF_VALUE: 24
PIF_VALUE: 18
PIF_VALUE: 24
PIF_VALUE: 25
PIF_VALUE: 1
PIF_VALUE: 24
PIF_VALUE: 25
PIF_VALUE: 4
PIF_VALUE: 19
PIF_VALUE: 25
PIF_VALUE: 17
PIF_VALUE: 18
PIF_VALUE: 19
PIF_VALUE: 24
PIF_VALUE: 6
PIF_VALUE: 25
PIF_VALUE: 25
PIF_VALUE: 24
PIF_VALUE: 25
PIF_VALUE: 17
PIF_VALUE: 17
PIF_VALUE: 4
PIF_VALUE: 24
PIF_VALUE: 18
PIF_VALUE: 17
PIF_VALUE: 24
PIF_VALUE: 0
PIF_VALUE: 24
PIF_VALUE: 25
PIF_VALUE: 18
PIF_VALUE: 24
PIF_VALUE: 25
PIF_VALUE: 17
PIF_VALUE: 24
PIF_VALUE: 2
PIF_VALUE: 24
PIF_VALUE: 24
PIF_VALUE: 18
PIF_VALUE: 17
PIF_VALUE: 17
PIF_VALUE: 21
PIF_VALUE: 16
PIF_VALUE: 24
PIF_VALUE: 25
PIF_VALUE: 24
PIF_VALUE: 19
PIF_VALUE: 25
PIF_VALUE: 0
PIF_VALUE: 3
PIF_VALUE: 17
PIF_VALUE: 25
PIF_VALUE: 0
PIF_VALUE: 2
PIF_VALUE: 1
PIF_VALUE: 22
PIF_VALUE: 25
PIF_VALUE: 24
PIF_VALUE: 25
PIF_VALUE: 17
PIF_VALUE: 25
PIF_VALUE: 18
PIF_VALUE: 2
PIF_VALUE: 25
PIF_VALUE: 22
PIF_VALUE: 24
PIF_VALUE: 25
PIF_VALUE: 24
PIF_VALUE: 25
PIF_VALUE: 17
PIF_VALUE: 25
PIF_VALUE: 16
PIF_VALUE: 24
PIF_VALUE: 17
PIF_VALUE: 25
PIF_VALUE: 25
PIF_VALUE: 24
PIF_VALUE: 25
PIF_VALUE: 23
PIF_VALUE: 25
PIF_VALUE: 25
PIF_VALUE: 13
PIF_VALUE: 25
PIF_VALUE: 22

## 2019-10-07 ASSESSMENT — PAIN SCALES - GENERAL
PAINLEVEL_OUTOF10: 5
PAINLEVEL_OUTOF10: 0
PAINLEVEL_OUTOF10: 8
PAINLEVEL_OUTOF10: 0
PAINLEVEL_OUTOF10: 0
PAINLEVEL_OUTOF10: 10
PAINLEVEL_OUTOF10: 5

## 2019-10-07 ASSESSMENT — PAIN DESCRIPTION - PAIN TYPE: TYPE: SURGICAL PAIN

## 2019-10-07 ASSESSMENT — PAIN - FUNCTIONAL ASSESSMENT: PAIN_FUNCTIONAL_ASSESSMENT: 0-10

## 2019-10-07 ASSESSMENT — PAIN DESCRIPTION - LOCATION: LOCATION: ABDOMEN

## 2019-10-09 LAB — SURGICAL PATHOLOGY REPORT: NORMAL

## 2019-10-17 ENCOUNTER — OFFICE VISIT (OUTPATIENT)
Dept: BARIATRICS/WEIGHT MGMT | Age: 39
End: 2019-10-17

## 2019-10-17 VITALS
RESPIRATION RATE: 20 BRPM | DIASTOLIC BLOOD PRESSURE: 78 MMHG | WEIGHT: 187 LBS | BODY MASS INDEX: 37.7 KG/M2 | HEART RATE: 72 BPM | SYSTOLIC BLOOD PRESSURE: 124 MMHG | HEIGHT: 59 IN

## 2019-10-17 DIAGNOSIS — Z98.84 STATUS POST GASTRIC BYPASS FOR OBESITY: Primary | ICD-10-CM

## 2019-10-17 PROCEDURE — 99024 POSTOP FOLLOW-UP VISIT: CPT | Performed by: SURGERY

## 2019-10-25 ENCOUNTER — NURSE ONLY (OUTPATIENT)
Dept: BARIATRICS/WEIGHT MGMT | Age: 39
End: 2019-10-25

## 2019-11-01 DIAGNOSIS — K29.50 ANTRAL GASTRITIS: ICD-10-CM

## 2019-11-01 RX ORDER — PANTOPRAZOLE SODIUM 40 MG/1
40 TABLET, DELAYED RELEASE ORAL DAILY
Qty: 60 TABLET | Refills: 3 | Status: SHIPPED | OUTPATIENT
Start: 2019-11-01 | End: 2020-02-24 | Stop reason: SDUPTHER

## 2019-11-01 RX ORDER — PROMETHAZINE HYDROCHLORIDE 25 MG/1
25 TABLET ORAL EVERY 6 HOURS PRN
Qty: 30 TABLET | Refills: 0 | Status: SHIPPED | OUTPATIENT
Start: 2019-11-01 | End: 2020-02-24 | Stop reason: SDUPTHER

## 2019-11-02 DIAGNOSIS — G25.81 RESTLESS LEG SYNDROME: ICD-10-CM

## 2019-11-04 RX ORDER — ROPINIROLE 0.5 MG/1
TABLET, FILM COATED ORAL
Qty: 90 TABLET | Refills: 3 | Status: SHIPPED | OUTPATIENT
Start: 2019-11-04 | End: 2020-01-03

## 2019-12-07 NOTE — TELEPHONE ENCOUNTER
Keflex e prescribed
Pt called again stated when she was in on 8/31/2018 you was suppose to send a script to [facility] [facility] for Keflex 500 mg please send script    Health Maintenance   Topic Date Due    Cervical cancer screen  12/12/2002    Potassium monitoring  05/30/2018    Creatinine monitoring  05/30/2018    Flu vaccine (1) 09/01/2018    DTaP/Tdap/Td vaccine (2 - Td) 06/30/2023    HIV screen  Completed             (applicable per patient's age: Cancer Screenings, Depression Screening, Fall Risk Screening, Immunizations)    Hemoglobin A1C (%)   Date Value   08/31/2018 5.3   05/30/2017 4.7   09/23/2015 5.1     LDL Cholesterol (mg/dL)   Date Value   05/30/2017 88     AST (U/L)   Date Value   09/23/2015 13     ALT (U/L)   Date Value   09/23/2015 18     BUN (mg/dL)   Date Value   05/30/2017 15      (goal A1C is < 7)   (goal LDL is <100) need 30-50% reduction from baseline     BP Readings from Last 3 Encounters:   09/05/18 138/89   08/31/18 (!) 152/88   08/30/18 137/87    (goal /80)      All Future Testing planned in [facility]:  Lab Frequency Next Occurrence   CBC Once 08/31/2018   Lipid Panel Once 08/31/2018   Comprehensive Metabolic Panel Once 33/17/7385   Vitamin B12 Once 08/31/2018   Vitamin D 25 Hydroxy Once 11/29/2018   ARIEL Once 10/20/2018   Hemoglobin A1C Once 10/20/2018   Immunofixation serum profile Once 10/20/2018   Electrophoresis Protein, Serum without Reflex to Immunofixation Once 10/20/2018   Protein Electrophoresis, Urine Once 10/20/2018   Sedimentation rate, automated Once 10/20/2018   Vitamin B12 Once 09/05/2018       Next Visit Date:  Future Appointments  Date Time Provider Tl Reaves   9/28/2018 1:00 PM [facility]   11/20/2018 10:00 AM Cherri Morales MD [facility]            Patient Active Problem List:     Nonintractable headache     Neuropathy     Obesities, morbid ([address]
Received a call from Dr Sam Molina, the medication has been discontinued by a Alda Solano, an MA (unknown) -- so Dr Sam Molina was unsure about re ordering medication, please advise        Health Maintenance   Topic Date Due    Cervical cancer screen  12/12/2002    Potassium monitoring  05/30/2018    Creatinine monitoring  05/30/2018    Flu vaccine (1) 09/01/2018    DTaP/Tdap/Td vaccine (2 - Td) 06/30/2023    HIV screen  Completed             (applicable per patient's age: Cancer Screenings, Depression Screening, Fall Risk Screening, Immunizations)    Hemoglobin A1C (%)   Date Value   08/31/2018 5.3   05/30/2017 4.7   09/23/2015 5.1     LDL Cholesterol (mg/dL)   Date Value   05/30/2017 88     AST (U/L)   Date Value   09/23/2015 13     ALT (U/L)   Date Value   09/23/2015 18     BUN (mg/dL)   Date Value   05/30/2017 15      (goal A1C is < 7)   (goal LDL is <100) need 30-50% reduction from baseline     BP Readings from Last 3 Encounters:   09/05/18 138/89   08/31/18 (!) 152/88   08/30/18 137/87    (goal /80)      All Future Testing planned in [facility]:  Lab Frequency Next Occurrence   CBC Once 08/31/2018   Lipid Panel Once 08/31/2018   Comprehensive Metabolic Panel Once 13/48/8585   Vitamin B12 Once 08/31/2018   Vitamin D 25 Hydroxy Once 11/29/2018   ARIEL Once 10/20/2018   Hemoglobin A1C Once 10/20/2018   Immunofixation serum profile Once 10/20/2018   Electrophoresis Protein, Serum without Reflex to Immunofixation Once 10/20/2018   Protein Electrophoresis, Urine Once 10/20/2018   Sedimentation rate, automated Once 10/20/2018   Vitamin B12 Once 09/05/2018       Next Visit Date:  Future Appointments  Date Time Provider Tl Reaves   9/28/2018 1:00 PM [facility]   11/20/2018 10:00 AM Sarah Chew MD [facility]            Patient Active Problem List:     Nonintractable headache     Neuropathy     Obesities, morbid ([facility] [address] [facility]
no

## 2019-12-11 ENCOUNTER — NURSE ONLY (OUTPATIENT)
Dept: BARIATRICS/WEIGHT MGMT | Age: 39
End: 2019-12-11

## 2020-01-03 ENCOUNTER — OFFICE VISIT (OUTPATIENT)
Dept: BARIATRICS/WEIGHT MGMT | Age: 40
End: 2020-01-03
Payer: MEDICARE

## 2020-01-03 VITALS
DIASTOLIC BLOOD PRESSURE: 76 MMHG | HEIGHT: 59 IN | SYSTOLIC BLOOD PRESSURE: 106 MMHG | BODY MASS INDEX: 35.28 KG/M2 | WEIGHT: 175 LBS | HEART RATE: 76 BPM

## 2020-01-03 PROBLEM — E66.9 OBESITY (BMI 30-39.9): Status: ACTIVE | Noted: 2020-01-03

## 2020-01-03 PROBLEM — E66.01 OBESITY, CLASS III, BMI 40-49.9 (MORBID OBESITY) (HCC): Status: RESOLVED | Noted: 2019-08-19 | Resolved: 2020-01-03

## 2020-01-03 PROCEDURE — 1036F TOBACCO NON-USER: CPT | Performed by: NURSE PRACTITIONER

## 2020-01-03 PROCEDURE — G8427 DOCREV CUR MEDS BY ELIG CLIN: HCPCS | Performed by: NURSE PRACTITIONER

## 2020-01-03 PROCEDURE — 99213 OFFICE O/P EST LOW 20 MIN: CPT | Performed by: NURSE PRACTITIONER

## 2020-01-03 PROCEDURE — G8482 FLU IMMUNIZE ORDER/ADMIN: HCPCS | Performed by: NURSE PRACTITIONER

## 2020-01-03 PROCEDURE — G8417 CALC BMI ABV UP PARAM F/U: HCPCS | Performed by: NURSE PRACTITIONER

## 2020-01-03 NOTE — PROGRESS NOTES
Post-op Bariatric Surgery Note    Subjective     Patient is 9 months s/p laparoscopic Zaid-en-Y gastric bypass, down 77 lbs. Also 3 months post cholecystectomy and feeling better. Overall, doing well. Incisions well healed. Mostly consistent use of OTC MVI and calcium. Has not been getting adequate fluid intake and sometimes has constipation. Physical activity includes walking. No pain or other specific problems or concerns. Allergies: Allergies   Allergen Reactions    Latex Hives and Itching       Past Medical History:     Past Medical History:   Diagnosis Date    Gastric reflux     History of anxiety     history of panic attacks    Hypertension     Migraine     Neuropathy     Obesities, morbid ([address] 8/31/2018    TINO (obstructive sleep apnea) 12/17/2018    USES CPAP    Wears glasses    .     Past Surgical History:  Past Surgical History:   Procedure Laterality Date    ABDOMEN SURGERY      CHOLECYSTECTOMY, LAPAROSCOPIC  10/07/2019    Laparoscopic cholecystectomy    CHOLECYSTECTOMY, LAPAROSCOPIC N/A 10/7/2019    XI LAPAROSCOPIC ROBOTIC CHOLECYSTECTOMY performed by Radha Garcia DO at [address] [facility]  04/01/2019    ROBOTIC LAPAROSCOPIC GASTRIC BYPASS ZAID-EN-Y, LIVER BIOPSY, EGD     ZAID-EN-Y GASTRIC BYPASS N/A 4/1/2019    XI ROBOTIC LAPAROSCOPIC GASTRIC BYPASS ZAID-EN-Y, LIVER BIOPSY, EGD performed by Radha Garcia DO at [address] ENDOSCOPY N/A 12/7/2018    EGD ESOPHAGOGASTRODUODENOSCOPY performed by Radha Garcia DO at [facility]    UPPER GASTROINTESTINAL ENDOSCOPY N/A 6/14/2019    egd  performed by Radha Garcia DO at [facility] End[facility]    WISDOM TOOTH EXTRACTION         Family History:  Family History   Problem Relation Age of Onset    Thyroid Disease Mother     Diabetes Mother     Heart Disease Mother     High Blood Pressure Mother    Ina Ron Migraines Mother     Diabetes Father     Heart

## 2020-01-03 NOTE — PROGRESS NOTES
Medical Nutrition Therapy  Metabolic and Bariatric surgery  9 months after surgery follow up note         Pt reports: starting utilizing reminder on phone as she does not meet fluid and protein goals daily. Vitals:   Vitals:    01/03/20 0852   BP: 106/76   Pulse: 76   Weight: 175 lb (79.4 kg)   Height: 4' 10.5\" (1.486 m)      Body mass index is 35.95 kg/m². Labs reviewed:     Multivitamin/mineral intake:OTC MVI daily   Calcium intake:   Unspecified calcium daliy   Other:            Nutrition Assessment:   PES: Inadequate food and beverage intake r/t WLS as evidenced by loss of excess body weight lost 27 lbs over 5 months. Goals   60-80gm of protein  48-64oz of fluid  Vitamin adherance  Basic adherance to WLS behavious and this document has been scanned into the chart.        [] met     [x]  Not met        Plan:   F/u one year         Cynthia Stallings

## 2020-02-24 RX ORDER — PROMETHAZINE HYDROCHLORIDE 25 MG/1
25 TABLET ORAL EVERY 6 HOURS PRN
Qty: 30 TABLET | Refills: 0 | Status: SHIPPED | OUTPATIENT
Start: 2020-02-24 | End: 2020-04-15

## 2020-02-24 RX ORDER — PANTOPRAZOLE SODIUM 40 MG/1
40 TABLET, DELAYED RELEASE ORAL DAILY
Qty: 60 TABLET | Refills: 3 | Status: SHIPPED | OUTPATIENT
Start: 2020-02-24 | End: 2020-04-09 | Stop reason: SDUPTHER

## 2020-04-09 RX ORDER — PROMETHAZINE HYDROCHLORIDE 25 MG/1
25 TABLET ORAL EVERY 6 HOURS PRN
Qty: 30 TABLET | Refills: 0 | OUTPATIENT
Start: 2020-04-09

## 2020-04-09 RX ORDER — PANTOPRAZOLE SODIUM 40 MG/1
40 TABLET, DELAYED RELEASE ORAL DAILY
Qty: 60 TABLET | Refills: 3 | Status: SHIPPED | OUTPATIENT
Start: 2020-04-09 | End: 2020-05-15 | Stop reason: SDUPTHER

## 2020-04-15 RX ORDER — PROMETHAZINE HYDROCHLORIDE 25 MG/1
TABLET ORAL
Qty: 30 TABLET | Refills: 0 | Status: SHIPPED | OUTPATIENT
Start: 2020-04-15 | End: 2020-05-15 | Stop reason: SDUPTHER

## 2020-05-04 ENCOUNTER — HOSPITAL ENCOUNTER (EMERGENCY)
Age: 40
Discharge: HOME | End: 2020-05-04
Attending: EMERGENCY MEDICINE
Payer: MEDICARE

## 2020-05-04 VITALS
TEMPERATURE: 97.7 F | RESPIRATION RATE: 18 BRPM | OXYGEN SATURATION: 98 % | HEIGHT: 59 IN | SYSTOLIC BLOOD PRESSURE: 171 MMHG | BODY MASS INDEX: 33.87 KG/M2 | WEIGHT: 168 LBS | HEART RATE: 93 BPM | DIASTOLIC BLOOD PRESSURE: 108 MMHG

## 2020-05-04 PROCEDURE — 6360000002 HC RX W HCPCS: Performed by: STUDENT IN AN ORGANIZED HEALTH CARE EDUCATION/TRAINING PROGRAM

## 2020-05-04 PROCEDURE — 2500000003 HC RX 250 WO HCPCS: Performed by: STUDENT IN AN ORGANIZED HEALTH CARE EDUCATION/TRAINING PROGRAM

## 2020-05-04 PROCEDURE — 90471 IMMUNIZATION ADMIN: CPT | Performed by: STUDENT IN AN ORGANIZED HEALTH CARE EDUCATION/TRAINING PROGRAM

## 2020-05-04 PROCEDURE — 6370000000 HC RX 637 (ALT 250 FOR IP): Performed by: STUDENT IN AN ORGANIZED HEALTH CARE EDUCATION/TRAINING PROGRAM

## 2020-05-04 PROCEDURE — 99283 EMERGENCY DEPT VISIT LOW MDM: CPT

## 2020-05-04 PROCEDURE — 12053 INTMD RPR FACE/MM 5.1-7.5 CM: CPT

## 2020-05-04 PROCEDURE — 90715 TDAP VACCINE 7 YRS/> IM: CPT | Performed by: STUDENT IN AN ORGANIZED HEALTH CARE EDUCATION/TRAINING PROGRAM

## 2020-05-04 RX ORDER — LIDOCAINE HYDROCHLORIDE 10 MG/ML
20 INJECTION, SOLUTION INFILTRATION; PERINEURAL ONCE
Status: COMPLETED | OUTPATIENT
Start: 2020-05-04 | End: 2020-05-04

## 2020-05-04 RX ORDER — AMOXICILLIN AND CLAVULANATE POTASSIUM 875; 125 MG/1; MG/1
1 TABLET, FILM COATED ORAL ONCE
Status: COMPLETED | OUTPATIENT
Start: 2020-05-04 | End: 2020-05-04

## 2020-05-04 RX ORDER — AMOXICILLIN AND CLAVULANATE POTASSIUM 875; 125 MG/1; MG/1
1 TABLET, FILM COATED ORAL 2 TIMES DAILY
Qty: 14 TABLET | Refills: 0 | Status: SHIPPED | OUTPATIENT
Start: 2020-05-04 | End: 2020-05-11

## 2020-05-04 RX ORDER — HYDROCODONE BITARTRATE AND ACETAMINOPHEN 5; 325 MG/1; MG/1
1 TABLET ORAL ONCE
Status: COMPLETED | OUTPATIENT
Start: 2020-05-04 | End: 2020-05-04

## 2020-05-04 RX ADMIN — AMOXICILLIN AND CLAVULANATE POTASSIUM 1 TABLET: 875; 125 TABLET, FILM COATED ORAL at 11:44

## 2020-05-04 RX ADMIN — HYDROCODONE BITARTRATE AND ACETAMINOPHEN 1 TABLET: 5; 325 TABLET ORAL at 11:44

## 2020-05-04 RX ADMIN — LIDOCAINE HYDROCHLORIDE 20 ML: 10 INJECTION, SOLUTION INFILTRATION; PERINEURAL at 11:49

## 2020-05-04 RX ADMIN — TETANUS TOXOID, REDUCED DIPHTHERIA TOXOID AND ACELLULAR PERTUSSIS VACCINE, ADSORBED 0.5 ML: 5; 2.5; 8; 8; 2.5 SUSPENSION INTRAMUSCULAR at 11:44

## 2020-05-04 ASSESSMENT — PAIN SCALES - GENERAL
PAINLEVEL_OUTOF10: 10

## 2020-05-04 ASSESSMENT — PAIN DESCRIPTION - DESCRIPTORS: DESCRIPTORS: PATIENT UNABLE TO DESCRIBE

## 2020-05-04 ASSESSMENT — ENCOUNTER SYMPTOMS
BACK PAIN: 0
STRIDOR: 0
COUGH: 0

## 2020-05-04 ASSESSMENT — PAIN DESCRIPTION - ORIENTATION: ORIENTATION: RIGHT

## 2020-05-04 ASSESSMENT — PAIN DESCRIPTION - FREQUENCY: FREQUENCY: CONTINUOUS

## 2020-05-04 ASSESSMENT — PAIN DESCRIPTION - LOCATION: LOCATION: MOUTH

## 2020-05-04 ASSESSMENT — PAIN DESCRIPTION - PROGRESSION: CLINICAL_PROGRESSION: NOT CHANGED

## 2020-05-04 ASSESSMENT — PAIN DESCRIPTION - PAIN TYPE: TYPE: ACUTE PAIN

## 2020-05-04 NOTE — ED NOTES
Pt reports to the ED via EMS with c/o Dog bite. Pt states she was eating breakfast this morning the dog was laying on her and went to grab the food out of her hand and she pulled up and the dog got her lip on the right side. Pt states pain 10/10 that she can not describe, pt states she does not know about her tetanus. Pt is A&Ox4, RR even and non labored.       Kun Larson RN  05/04/20 9268

## 2020-05-04 NOTE — ED PROVIDER NOTES
[facility]  ED  Emergency Department Encounter  EmergencyMedicine Resident     Pt Christina Candelaria  MRN: [id number]  Sandrag[place] 1980  Date of evaluation: 5/4/20  PCP:  Sorin Duffy MD    CHIEF COMPLAINT       Chief Complaint   Patient presents with    Animal Bite       HISTORY OF PRESENT ILLNESS  (Location/Symptom, Timing/Onset, Context/Setting, Quality, Duration, Modifying Factors, Severity.)      Redd Denton is a 46 y.o. female who presents with dog bite of the face which occurred approximately 30 minutes prior to arrival.  Patient was bitten by her own dog. Last tetanus is unknown. Denies any anticoagulant use. Denies any other injuries with this event. She has a past history of hypertension, migraine, gastric bypass. She has not taken any medications for her current symptoms. PAST MEDICAL / SURGICAL / SOCIAL / FAMILY HISTORY      has a past medical history of Gastric reflux, History of anxiety, Hypertension, Migraine, Neuropathy, Obesities, morbid ([address] TINO (obstructive sleep apnea), and Wears glasses. has a past surgical history that includes Tubal ligation; Upper gastrointestinal endoscopy (N/A, 12/7/2018); [place] tooth extraction; Sukhjinder-en-Y Gastric Bypass (04/01/2019); Sukhjinder-en-Y Gastric Bypass (N/A, 4/1/2019); Upper gastrointestinal endoscopy (N/A, 6/14/2019); Abdomen surgery; Cholecystectomy, laparoscopic (10/07/2019); and Cholecystectomy, laparoscopic (N/A, 10/7/2019).     Social History     Socioeconomic History    Marital status:      Spouse name: Not on file    Number of children: Not on file    Years of education: Not on file    Highest education level: Not on file   Occupational History    Not on file   Social Needs    Financial resource strain: Not on file    Food insecurity     Worry: Not on file     Inability: Not on file    Transportation needs     Medical: Not on file     Non-medical: Not on file   Tobacco Use    Smoking status: Passive effort is normal.      Breath sounds: Normal breath sounds. Abdominal:      General: Abdomen is flat. There is no distension. Tenderness: There is no abdominal tenderness. Neurological:      General: No focal deficit present. Mental Status: She is alert and oriented to person, place, and time. DIFFERENTIAL  DIAGNOSIS     PLAN (LABS / IMAGING / EKG):  No orders of the defined types were placed in this encounter. MEDICATIONS ORDERED:  Orders Placed This Encounter   Medications    lidocaine 1 % injection 20 mL    amoxicillin-clavulanate (AUGMENTIN) 875-125 MG per tablet 1 tablet    Tetanus-Diphth-Acell Pertussis (BOOSTRIX) injection 0.5 mL    HYDROcodone-acetaminophen (NORCO) 5-325 MG per tablet 1 tablet    amoxicillin-clavulanate (AUGMENTIN) 875-125 MG per tablet     Sig: Take 1 tablet by mouth 2 times daily for 7 days     Dispense:  14 tablet     Refill:  0       DDX: Laceration, animal bite    DIAGNOSTIC RESULTS / EMERGENCY DEPARTMENT COURSE / MDM     LABS:  No results found for this visit on 05/04/20. IMPRESSION: facial laceration, animal bite    RADIOLOGY:  None    EKG  None    All EKG's are interpreted by the Emergency Department Physician who either signs or Co-signs this chart in the absence of a cardiologist.    EMERGENCY DEPARTMENT COURSE:  Patient is alert and oriented, no acute distress. Vital signs are within normal limits with exception of hypertension. There is an approximately 7 cm laceration of the right lower face and lip involving the vermilion border. Tetanus updated. Patient started on oral Augmentin. Wound irrigated copiously with sterile saline. Laceration repaired without incident following mental nerve block. We will discharge patient for outpatient follow-up. She remained stable throughout emergency department stay, not felt further work-up or imaging are indicated at this time.     PROCEDURES:  Laceration Repair Procedure Note    Indication:

## 2020-05-15 ENCOUNTER — OFFICE VISIT (OUTPATIENT)
Dept: INTERNAL MEDICINE | Age: 40
End: 2020-05-15
Payer: MEDICARE

## 2020-05-15 VITALS
HEIGHT: 58 IN | BODY MASS INDEX: 35.56 KG/M2 | DIASTOLIC BLOOD PRESSURE: 79 MMHG | WEIGHT: 169.4 LBS | HEART RATE: 64 BPM | SYSTOLIC BLOOD PRESSURE: 123 MMHG

## 2020-05-15 PROCEDURE — 1036F TOBACCO NON-USER: CPT | Performed by: INTERNAL MEDICINE

## 2020-05-15 PROCEDURE — 99211 OFF/OP EST MAY X REQ PHY/QHP: CPT | Performed by: INTERNAL MEDICINE

## 2020-05-15 PROCEDURE — G8427 DOCREV CUR MEDS BY ELIG CLIN: HCPCS | Performed by: INTERNAL MEDICINE

## 2020-05-15 PROCEDURE — 96160 PT-FOCUSED HLTH RISK ASSMT: CPT | Performed by: INTERNAL MEDICINE

## 2020-05-15 PROCEDURE — 99214 OFFICE O/P EST MOD 30 MIN: CPT | Performed by: INTERNAL MEDICINE

## 2020-05-15 PROCEDURE — G8431 POS CLIN DEPRES SCRN F/U DOC: HCPCS | Performed by: INTERNAL MEDICINE

## 2020-05-15 PROCEDURE — G8417 CALC BMI ABV UP PARAM F/U: HCPCS | Performed by: INTERNAL MEDICINE

## 2020-05-15 RX ORDER — DOCUSATE SODIUM 100 MG/1
CAPSULE, LIQUID FILLED ORAL
COMMUNITY
Start: 2019-10-07 | End: 2020-05-15 | Stop reason: SDUPTHER

## 2020-05-15 RX ORDER — HYDROCHLOROTHIAZIDE 12.5 MG/1
12.5 TABLET ORAL EVERY MORNING
Qty: 30 TABLET | Refills: 5 | Status: SHIPPED | OUTPATIENT
Start: 2020-05-15 | End: 2020-05-15 | Stop reason: ALTCHOICE

## 2020-05-15 RX ORDER — PROMETHAZINE HYDROCHLORIDE 25 MG/1
TABLET ORAL
Qty: 30 TABLET | Refills: 0 | Status: CANCELLED | OUTPATIENT
Start: 2020-05-15

## 2020-05-15 RX ORDER — HYDROCHLOROTHIAZIDE 12.5 MG/1
12.5 TABLET ORAL DAILY
Qty: 30 TABLET | Refills: 5 | Status: SHIPPED | OUTPATIENT
Start: 2020-05-15 | End: 2020-11-23 | Stop reason: ALTCHOICE

## 2020-05-15 RX ORDER — DOCUSATE SODIUM 100 MG/1
100 CAPSULE, LIQUID FILLED ORAL 2 TIMES DAILY PRN
Qty: 60 CAPSULE | Refills: 3 | Status: SHIPPED | OUTPATIENT
Start: 2020-05-15 | End: 2020-08-03 | Stop reason: SDUPTHER

## 2020-05-15 RX ORDER — PANTOPRAZOLE SODIUM 40 MG/1
40 TABLET, DELAYED RELEASE ORAL DAILY
Qty: 60 TABLET | Refills: 3 | Status: SHIPPED | OUTPATIENT
Start: 2020-05-15 | End: 2020-09-21 | Stop reason: SDUPTHER

## 2020-05-15 RX ORDER — CALCIUM CARBONATE 200(500)MG
1 TABLET,CHEWABLE ORAL 2 TIMES DAILY
Qty: 60 TABLET | Refills: 5 | Status: SHIPPED | OUTPATIENT
Start: 2020-05-15

## 2020-05-15 RX ORDER — QUETIAPINE FUMARATE 50 MG/1
50 TABLET, FILM COATED ORAL NIGHTLY
Qty: 30 TABLET | Refills: 0 | Status: SHIPPED | OUTPATIENT
Start: 2020-05-15 | End: 2020-09-24 | Stop reason: ALTCHOICE

## 2020-05-15 RX ORDER — SPIRONOLACTONE 25 MG/1
25 TABLET ORAL DAILY
Qty: 30 TABLET | Refills: 3 | Status: SHIPPED | OUTPATIENT
Start: 2020-05-15 | End: 2020-05-15 | Stop reason: ALTCHOICE

## 2020-05-15 RX ORDER — PROMETHAZINE HYDROCHLORIDE 25 MG/1
25 TABLET ORAL EVERY 8 HOURS PRN
Qty: 30 TABLET | Refills: 2 | Status: SHIPPED | OUTPATIENT
Start: 2020-05-15 | End: 2020-08-03 | Stop reason: SDUPTHER

## 2020-05-15 ASSESSMENT — PATIENT HEALTH QUESTIONNAIRE - PHQ9
5. POOR APPETITE OR OVEREATING: 3
3. TROUBLE FALLING OR STAYING ASLEEP: 1
2. FEELING DOWN, DEPRESSED OR HOPELESS: 2
SUM OF ALL RESPONSES TO PHQ QUESTIONS 1-9: 14
SUM OF ALL RESPONSES TO PHQ9 QUESTIONS 1 & 2: 5
7. TROUBLE CONCENTRATING ON THINGS, SUCH AS READING THE NEWSPAPER OR WATCHING TELEVISION: 1
9. THOUGHTS THAT YOU WOULD BE BETTER OFF DEAD, OR OF HURTING YOURSELF: 0
8. MOVING OR SPEAKING SO SLOWLY THAT OTHER PEOPLE COULD HAVE NOTICED. OR THE OPPOSITE, BEING SO FIGETY OR RESTLESS THAT YOU HAVE BEEN MOVING AROUND A LOT MORE THAN USUAL: 1
10. IF YOU CHECKED OFF ANY PROBLEMS, HOW DIFFICULT HAVE THESE PROBLEMS MADE IT FOR YOU TO DO YOUR WORK, TAKE CARE OF THINGS AT HOME, OR GET ALONG WITH OTHER PEOPLE: 2
SUM OF ALL RESPONSES TO PHQ QUESTIONS 1-9: 14
6. FEELING BAD ABOUT YOURSELF - OR THAT YOU ARE A FAILURE OR HAVE LET YOURSELF OR YOUR FAMILY DOWN: 0
1. LITTLE INTEREST OR PLEASURE IN DOING THINGS: 3
4. FEELING TIRED OR HAVING LITTLE ENERGY: 3

## 2020-05-15 NOTE — PATIENT INSTRUCTIONS
Medications e-scribe to pharmacy of pt's choice. Script for lab given to pt, no fasting required. Pt will get labs done before next appt. An After Visit Summary was printed and given to the patient.

## 2020-05-15 NOTE — PROGRESS NOTES
[facility]      Date of Patient's Visit: 5/15/2020    Progress note    Patient Care Team:  Marie Hilton MD as PCP - Denise Georges MD as Consulting Physician (Pulmonology)      [address] [place] [address]  Chief Complaint   Patient presents with    Medication Refill       SUBJECTIVE  Kwadwo oMrrow is a 46 y.o. female who presents for evaluation of mood disorder , social phobia, anxiety and panic   Insomnia, crying spells , easy irritability , difficulty organizing her thoughts   She has previous h/o gastri bypass surgery           ROS  All other review of systems negative, except for those noted.     Review of Systems    Past Medical History:   Diagnosis Date    Gastric reflux     History of anxiety     history of panic attacks    Hypertension     Migraine     Neuropathy     Obesities, morbid ([address] 8/31/2018    TINO (obstructive sleep apnea) 12/17/2018    USES CPAP    Wears glasses        Past Surgical History:   Procedure Laterality Date    ABDOMEN SURGERY      CHOLECYSTECTOMY, LAPAROSCOPIC  10/07/2019    Laparoscopic cholecystectomy    CHOLECYSTECTOMY, LAPAROSCOPIC N/A 10/7/2019    XI LAPAROSCOPIC ROBOTIC CHOLECYSTECTOMY performed by Aubree Palomino DO at [address] [facility]  04/01/2019    ROBOTIC LAPAROSCOPIC GASTRIC BYPASS ZAID-EN-Y, LIVER BIOPSY, EGD     ZAID-EN-Y GASTRIC BYPASS N/A 4/1/2019    XI ROBOTIC LAPAROSCOPIC GASTRIC BYPASS ZAID-EN-Y, LIVER BIOPSY, EGD performed by Aubree Palomino DO at [address] ENDOSCOPY N/A 12/7/2018    EGD ESOPHAGOGASTRODUODENOSCOPY performed by Aubree Palomino DO at [facility]    UPPER GASTROINTESTINAL ENDOSCOPY N/A 6/14/2019    egd  performed by Aubree Palomino DO at [facility]    WISDOM TOOTH EXTRACTION         Family History   Problem Relation Age of Onset    Thyroid Disease Mother     Diabetes Mother     Heart Disease Mother bipolar disorder , escalate to therapeutic dose in 3 weeks eventually  She is advised to maintain a sleep hygiene  Write down her thoughts to organize them in the morning, when she wakes up to make a tentative time schedule for the routine  Graded exposure to the public places, with not more than 15 min for now , use phone as a timer           Follow up Instructions:    1. Return in about 4 weeks (around 6/12/2020). 2. Reviewed prior labs and health maintenance. 3. Discussed use, benefit, and side effects of prescribed medications. Barriers to medication compliance addressed. All patient questions answered. Pt voiced understanding.      4. Patient given educational materials - see patient instructions      Cielo Pimentel MD  Attending Physician, [address] [place] [address], Internal Medicine Residency Program  [address] [place] [address]  5/15/2020, 4:09 PM    .

## 2020-05-28 ENCOUNTER — OFFICE VISIT (OUTPATIENT)
Dept: BARIATRICS/WEIGHT MGMT | Age: 40
End: 2020-05-28
Payer: MEDICARE

## 2020-05-28 VITALS
BODY MASS INDEX: 33.67 KG/M2 | WEIGHT: 167 LBS | RESPIRATION RATE: 20 BRPM | HEART RATE: 66 BPM | SYSTOLIC BLOOD PRESSURE: 116 MMHG | TEMPERATURE: 97.3 F | HEIGHT: 59 IN | DIASTOLIC BLOOD PRESSURE: 70 MMHG

## 2020-05-28 PROCEDURE — 99213 OFFICE O/P EST LOW 20 MIN: CPT | Performed by: SURGERY

## 2020-05-28 PROCEDURE — G8427 DOCREV CUR MEDS BY ELIG CLIN: HCPCS | Performed by: SURGERY

## 2020-05-28 PROCEDURE — 1036F TOBACCO NON-USER: CPT | Performed by: SURGERY

## 2020-05-28 PROCEDURE — G8417 CALC BMI ABV UP PARAM F/U: HCPCS | Performed by: SURGERY

## 2020-05-28 RX ORDER — SUCRALFATE 1 G/1
1 TABLET ORAL 4 TIMES DAILY
Qty: 120 TABLET | Refills: 3 | Status: SHIPPED | OUTPATIENT
Start: 2020-05-28 | End: 2020-09-24 | Stop reason: ALTCHOICE

## 2020-06-03 NOTE — PROGRESS NOTES
[facility]  [facility]  [address] [place] [address] [place]  [address] [id number]  Dept: [phone]    SURGICAL WEIGHT LOSS MANAGEMENT PROGRAM  PROGRESS NOTE     CC: Weight Loss    Patient: Zenon Robison      Service Date: 5/28/2020  Visit:   1 year   Medical Record #: [id number]  Date of Surgery:   4/1/2019    Reason for Visit: Routine Post-Operative:  [] New Problem /   [] FU of existing problem    Patient seen for 1 year visit after gastric bypass. Maria Guadalupeene Ayush Epigastric pain occasionally when eating. No nausea, fevers/chills. Occasional constipation. Tolerating diet. Exercise limited due to Covid. Taking vitamins. No new complaints    Height: 4' 10.5\" (1.486 m)  Highest Weight:   256 lbs    Current Visit Weight Information  Weight: 167 lb (75.8 kg)   BMI: Body mass index is 34.31 kg/m². Weight loss since surgery:     89    1 wk - D/C'd home on VTE Prohylaxis? [x] Yes   [] No   On VTE Proph as directed? [x] Yes   [] No     [Labs to be drawn prior to 1m, 3m, 6m, 12m, 18m, and annual visits]    Liver pathology:    [x] NA    [] No Gross path    [] Liver Steatosis   [] Discussed w/ pt   [] Referred to GI     Exercising?    [x] Yes    [] No     Review of Systems:     General  Negative for: [] Weight Change   [x] Fatigue   [x] Fevers & Chills    [] Appetite change [] Other:    Positive for: [x] Weight Change   [] Fatigue   [] Fevers & Chills    [] Appetite change [] Other:   Cardiac  Negative for: [x] Chest Pain   [x] Difficulty Breathing   [] Leg Cramps [x] Edema of Lower Extremeties    [] Left   [] Right      Positive for: [] Chest Pain   [] Difficulty Breathing   [] Leg Cramps [] Edema of Lower Extremeties    [] Left   [] Right   Pulmonary  Negative for: [x] Shortness of Breath [] Wheeze [x] Cough  [] Calf Pain     Positive for: [] Shortness of Breath [] Wheeze [] Cough  [] Calf Pain   Gastro-Intestinal Negative for: [] Heartburn   [] Reflux   [x] Dysphagia   [x] Melena   [x] BRBPR  [x]

## 2020-07-08 ENCOUNTER — NURSE ONLY (OUTPATIENT)
Dept: BARIATRICS/WEIGHT MGMT | Age: 40
End: 2020-07-08

## 2020-07-09 VITALS — TEMPERATURE: 98.1 F

## 2020-07-31 ENCOUNTER — NURSE ONLY (OUTPATIENT)
Dept: BARIATRICS/WEIGHT MGMT | Age: 40
End: 2020-07-31

## 2020-08-03 RX ORDER — PROMETHAZINE HYDROCHLORIDE 25 MG/1
25 TABLET ORAL EVERY 8 HOURS PRN
Qty: 30 TABLET | Refills: 2 | Status: SHIPPED | OUTPATIENT
Start: 2020-08-03 | End: 2020-09-21 | Stop reason: SDUPTHER

## 2020-08-03 RX ORDER — DOCUSATE SODIUM 100 MG/1
100 CAPSULE, LIQUID FILLED ORAL 2 TIMES DAILY PRN
Qty: 60 CAPSULE | Refills: 3 | Status: SHIPPED | OUTPATIENT
Start: 2020-08-03 | End: 2020-09-21 | Stop reason: SDUPTHER

## 2020-08-12 ENCOUNTER — HOSPITAL ENCOUNTER (EMERGENCY)
Age: 40
Discharge: HOME | End: 2020-08-12
Attending: EMERGENCY MEDICINE
Payer: MEDICARE

## 2020-08-12 ENCOUNTER — APPOINTMENT (OUTPATIENT)
Dept: GENERAL RADIOLOGY | Age: 40
End: 2020-08-12
Payer: MEDICARE

## 2020-08-12 VITALS
BODY MASS INDEX: 33.06 KG/M2 | OXYGEN SATURATION: 98 % | DIASTOLIC BLOOD PRESSURE: 84 MMHG | SYSTOLIC BLOOD PRESSURE: 130 MMHG | HEIGHT: 59 IN | HEART RATE: 81 BPM | WEIGHT: 164 LBS | RESPIRATION RATE: 16 BRPM | TEMPERATURE: 99 F

## 2020-08-12 LAB
ABSOLUTE EOS #: 0.23 K/UL (ref 0–0.44)
ABSOLUTE IMMATURE GRANULOCYTE: <0.03 K/UL (ref 0–0.3)
ABSOLUTE LYMPH #: 2.45 K/UL (ref 1.1–3.7)
ABSOLUTE MONO #: 0.49 K/UL (ref 0.1–1.2)
ANION GAP SERPL CALCULATED.3IONS-SCNC: 13 MMOL/L (ref 9–17)
BASOPHILS # BLD: 1 % (ref 0–2)
BASOPHILS ABSOLUTE: 0.05 K/UL (ref 0–0.2)
BUN BLDV-MCNC: 10 MG/DL (ref 6–20)
BUN/CREAT BLD: NORMAL (ref 9–20)
CALCIUM IONIZED: 1.22 MMOL/L (ref 1.13–1.33)
CALCIUM SERPL-MCNC: 9.3 MG/DL (ref 8.6–10.4)
CHLORIDE BLD-SCNC: 102 MMOL/L (ref 98–107)
CO2: 21 MMOL/L (ref 20–31)
CREAT SERPL-MCNC: 0.54 MG/DL (ref 0.5–0.9)
DIFFERENTIAL TYPE: ABNORMAL
EOSINOPHILS RELATIVE PERCENT: 3 % (ref 1–4)
GFR AFRICAN AMERICAN: >60 ML/MIN
GFR NON-AFRICAN AMERICAN: >60 ML/MIN
GFR SERPL CREATININE-BSD FRML MDRD: NORMAL ML/MIN/{1.73_M2}
GFR SERPL CREATININE-BSD FRML MDRD: NORMAL ML/MIN/{1.73_M2}
GLUCOSE BLD-MCNC: 97 MG/DL (ref 70–99)
HCG QUALITATIVE: NEGATIVE
HCT VFR BLD CALC: 47.9 % (ref 36.3–47.1)
HEMOGLOBIN: 15.6 G/DL (ref 11.9–15.1)
IMMATURE GRANULOCYTES: 0 %
LYMPHOCYTES # BLD: 30 % (ref 24–43)
MAGNESIUM: 2.1 MG/DL (ref 1.6–2.6)
MCH RBC QN AUTO: 31.7 PG (ref 25.2–33.5)
MCHC RBC AUTO-ENTMCNC: 32.6 G/DL (ref 28.4–34.8)
MCV RBC AUTO: 97.4 FL (ref 82.6–102.9)
MONOCYTES # BLD: 6 % (ref 3–12)
NRBC AUTOMATED: 0 PER 100 WBC
PDW BLD-RTO: 13.2 % (ref 11.8–14.4)
PHOSPHORUS: 4.2 MG/DL (ref 2.6–4.5)
PLATELET # BLD: 186 K/UL (ref 138–453)
PLATELET ESTIMATE: ABNORMAL
PMV BLD AUTO: 12.5 FL (ref 8.1–13.5)
POTASSIUM SERPL-SCNC: 3.9 MMOL/L (ref 3.7–5.3)
RBC # BLD: 4.92 M/UL (ref 3.95–5.11)
RBC # BLD: ABNORMAL 10*6/UL
SEG NEUTROPHILS: 60 % (ref 36–65)
SEGMENTED NEUTROPHILS ABSOLUTE COUNT: 5.01 K/UL (ref 1.5–8.1)
SODIUM BLD-SCNC: 136 MMOL/L (ref 135–144)
TROPONIN INTERP: NORMAL
TROPONIN T: NORMAL NG/ML
TROPONIN, HIGH SENSITIVITY: <6 NG/L (ref 0–14)
TSH SERPL DL<=0.05 MIU/L-ACNC: 1.32 MIU/L (ref 0.3–5)
WBC # BLD: 8.3 K/UL (ref 3.5–11.3)
WBC # BLD: ABNORMAL 10*3/UL

## 2020-08-12 PROCEDURE — 83735 ASSAY OF MAGNESIUM: CPT

## 2020-08-12 PROCEDURE — 84484 ASSAY OF TROPONIN QUANT: CPT

## 2020-08-12 PROCEDURE — 71046 X-RAY EXAM CHEST 2 VIEWS: CPT

## 2020-08-12 PROCEDURE — 2580000003 HC RX 258: Performed by: STUDENT IN AN ORGANIZED HEALTH CARE EDUCATION/TRAINING PROGRAM

## 2020-08-12 PROCEDURE — 82330 ASSAY OF CALCIUM: CPT

## 2020-08-12 PROCEDURE — 84100 ASSAY OF PHOSPHORUS: CPT

## 2020-08-12 PROCEDURE — 84443 ASSAY THYROID STIM HORMONE: CPT

## 2020-08-12 PROCEDURE — 85025 COMPLETE CBC W/AUTO DIFF WBC: CPT

## 2020-08-12 PROCEDURE — 84703 CHORIONIC GONADOTROPIN ASSAY: CPT

## 2020-08-12 PROCEDURE — 80048 BASIC METABOLIC PNL TOTAL CA: CPT

## 2020-08-12 PROCEDURE — 96360 HYDRATION IV INFUSION INIT: CPT

## 2020-08-12 PROCEDURE — 99285 EMERGENCY DEPT VISIT HI MDM: CPT

## 2020-08-12 PROCEDURE — 93005 ELECTROCARDIOGRAM TRACING: CPT | Performed by: STUDENT IN AN ORGANIZED HEALTH CARE EDUCATION/TRAINING PROGRAM

## 2020-08-12 RX ORDER — 0.9 % SODIUM CHLORIDE 0.9 %
1000 INTRAVENOUS SOLUTION INTRAVENOUS ONCE
Status: COMPLETED | OUTPATIENT
Start: 2020-08-12 | End: 2020-08-12

## 2020-08-12 RX ADMIN — SODIUM CHLORIDE 1000 ML: 9 INJECTION, SOLUTION INTRAVENOUS at 17:59

## 2020-08-12 ASSESSMENT — ENCOUNTER SYMPTOMS
VOMITING: 0
COUGH: 0
CONSTIPATION: 1
NAUSEA: 0
ABDOMINAL PAIN: 0
SHORTNESS OF BREATH: 0
WHEEZING: 0

## 2020-08-12 ASSESSMENT — PAIN SCALES - GENERAL: PAINLEVEL_OUTOF10: 5

## 2020-08-12 ASSESSMENT — PAIN DESCRIPTION - LOCATION: LOCATION: ABDOMEN

## 2020-08-12 ASSESSMENT — PAIN DESCRIPTION - PAIN TYPE: TYPE: ACUTE PAIN

## 2020-08-12 NOTE — ED PROVIDER NOTES
[facility]  ED  eMERGENCY dEPARTMENT eNCOUnter   Attending Attestation     Pt Name: George Muhammad  MRN: [id number]  [place] 1980  Date of evaluation: 8/12/20       George Muhammad is a 46 y.o. female who presents with Palpitations (lightheaded, headache)      History: Presents with palpitations, lightheadedness, syncope yesterday. Patient said that she has had these symptoms in the past.  Patient is a gastric bypass patient. Patient said that it is been a while since she had the symptoms. Exam: Heart rate and rhythm are regular. Lungs are clear to auscultation bilaterally. Abdomen is soft, nontender. Patient is well-appearing. EKG shows normal sinus rhythm with a rate of 70 bpm.  Normal axis. No ST elevation depression. Nonspecific EKG. Plan for cardiac screening and probable discharge. Concern for dehydration. Patient improved after fluids. patient states that she is able to stand up and walk to and from the bathroom without difficulty. Patient discharged. I performed a history and physical examination of the patient and discussed management with the resident. I reviewed the residents note and agree with the documented findings and plan of care. Any areas of disagreement are noted on the chart. I was personally present for the key portions of any procedures. I have documented in the chart those procedures where I was not present during the key portions. I have personally reviewed all images and agree with the resident's interpretation. I have reviewed the emergency nurses triage note. I agree with the chief complaint, past medical history, past surgical history, allergies, medications, social and family history as documented unless otherwise noted below. Documentation of the HPI, Physical Exam and Medical Decision Making performed by medical students or scribes is based on my personal performance of the HPI, PE and MDM.  For Phys Assistant/ Nurse Practitioner cases/documentation I have had a face to face evaluation of this patient and have completed at least one if not all key elements of the E/M (history, physical exam, and MDM). Additional findings are as noted. For APC cases I have personally evaluated and examined the patient in conjunction with the APC and agree with the treatment plan and disposition of the patient as recorded by the APC.     Oneida Dove MD  Attending Emergency  Physician       Iazbel Duong MD  08/12/20 7684

## 2020-08-12 NOTE — DISCHARGE INSTR - COC
Continuity of Care Form    Patient Name: April Sanches   :  1980  MRN:  [id number]    Admit date:  2020  Discharge date:  ***    Code Status Order: Prior   Advance Directives:     Admitting Physician:  No admitting provider for patient encounter. PCP: Lien Panda MD    Discharging Nurse: [facility] Unit/Room#:   Discharging Unit Phone Number: ***    Emergency Contact:   Extended Emergency Contact Information  Primary Emergency Contact: [facility]  Address: [place] [address], [address] [place] [address] Phone: [phone]  Work Phone: [phone]  Mobile Phone: [phone]  Relation: Spouse  Hearing or visual needs: None  Other needs: None  Preferred language: English   needed?  No    Past Surgical History:  Past Surgical History:   Procedure Laterality Date    ABDOMEN SURGERY      CHOLECYSTECTOMY, LAPAROSCOPIC  10/07/2019    Laparoscopic cholecystectomy    CHOLECYSTECTOMY, LAPAROSCOPIC N/A 10/7/2019    XI LAPAROSCOPIC ROBOTIC CHOLECYSTECTOMY performed by Tim Saenz DO at [address] [place] [address]  2019    ROBOTIC LAPAROSCOPIC GASTRIC BYPASS ZAID-EN-Y, LIVER BIOPSY, EGD     ZAID-EN-Y GASTRIC BYPASS N/A 2019    XI ROBOTIC LAPAROSCOPIC GASTRIC BYPASS ZAID-EN-Y, LIVER BIOPSY, EGD performed by Tim Saenz DO at [address] [place] [address] ENDOSCOPY N/A 2018    EGD ESOPHAGOGASTRODUODENOSCOPY performed by Tim Saenz DO at [address] [place] [address] N/A 2019    egd  performed by Tim Saenz DO at [facility]    WISDOM TOOTH EXTRACTION         Immunization History:   Immunization History   Administered Date(s) Administered    DTaP vaccine 2020    Influenza, Quadv, IM, (6 mo and older Fluzone, Flulaval, Fluarix and 3 yrs and older Afluria) 2019    Influenza, Quadv, IM, PF (6 mo and older Fluzone, Flulaval, Fluarix, and 3 yrs and older Afluria) 09/19/2018    Tdap (Boostrix, Adacel) 06/30/2013, 05/04/2020       Active Problems:  Patient Active Problem List   Diagnosis Code    Chronic migraine G43.709    Neuropathy G62.9    Vitamin D deficiency E55.9    Antral gastritis K29.50    TINO (obstructive sleep apnea) G47.33    Status post gastric bypass for obesity Z98.84    Restless leg syndrome G25.81    Folate deficiency E53.8    Low zinc level E60    Gastroesophageal reflux disease without esophagitis K21.9    Obesity (BMI 30-39. 9) E66.9       Isolation/Infection:   Isolation          No Isolation        Patient Infection Status     None to display          Nurse Assessment:  Last Vital Signs: /84   Pulse 81   Temp 99 °F (37.2 °C) (Oral)   Resp 16   Ht 4' 11\" (1.499 m)   Wt 164 lb (74.4 kg)   LMP 07/14/2020 (Exact Date)   SpO2 98%   BMI 33.12 kg/m²     Last documented pain score (0-10 scale): Pain Level: 5  Last Weight:   Wt Readings from Last 1 Encounters:   08/12/20 164 lb (74.4 kg)     Mental Status:  {IP PT MENTAL STATUS:08157}    IV Access:  { EILEEN IV ACCESS:525263075}    Nursing Mobility/ADLs:  Walking   {P DME QIVK:592397186}  Transfer  {P DME VMPE:582130200}  Bathing  {P DME DQTU:360121018}  Dressing  {P DME WHGZ:384436598}  Toileting  {P DME ZAXB:633229222}  Feeding  {P DME UGOF:440403164}  Med Admin  {P DME NUIY:004879541}  Med Delivery   { EILEEN MED Delivery:315943919}    Wound Care Documentation and Therapy:        Elimination:  Continence:   · Bowel: {YES / FP:26375}  · Bladder: {YES / WZ:19807}  Urinary Catheter: {Urinary Catheter:517147467}   Colostomy/Ileostomy/Ileal Conduit: {YES / WC:29122}       Date of Last BM: ***  No intake or output data in the 24 hours ending 08/12/20 1835  No intake/output data recorded.     Safety Concerns:     [facility] Safety Concerns:471038551}    Impairments/Disabilities:      [address] Impairments/Disabilities:555340859}    Nutrition Therapy:  Current Nutrition Therapy:   [address] EILEEN Diet List:820281588}    Routes of Feeding: {CHP DME Other Feedings:761656471}  Liquids: {Slp liquid thickness:65729}  Daily Fluid Restriction: {CHP DME Yes amt example:148689236}  Last Modified Barium Swallow with Video (Video Swallowing Test): {Done Not Done KZCI:854000804}    Treatments at the Time of Hospital Discharge:   Respiratory Treatments: ***  Oxygen Therapy:  {Therapy; copd oxygen:49309}  Ventilator:    {[facility] Vent UOMF:949180656}    Rehab Therapies: {THERAPEUTIC INTERVENTION:3675625770}  Weight Bearing Status/Restrictions: { CC Weight Bearin}  Other Medical Equipment (for information only, NOT a DME order):  {EQUIPMENT:131959879}  Other Treatments: ***    Patient's personal belongings (please select all that are sent with patient):  {[facility] DME Belongings:958068643}    RN SIGNATURE:  {Esignature:246080506}    CASE MANAGEMENT/SOCIAL WORK SECTION    Inpatient Status Date: ***    Readmission Risk Assessment Score:  Readmission Risk              Risk of Unplanned Readmission:        0           Discharging to Facility/ Agency   · Name:   · Address:  · Phone:  · Fax:    Dialysis Facility (if applicable)   · Name:  · Address:  · Dialysis Schedule:  · Phone:  · Fax:    / signature: {Esignature:100943953}    PHYSICIAN SECTION    Prognosis: {Prognosis:1396839128}    Condition at Discharge: [address] Patient Condition:669807817}    Rehab Potential (if transferring to Rehab): {Prognosis:0087375080}    Recommended Labs or Other Treatments After Discharge: ***    Physician Certification: I certify the above information and transfer of Kerry Her  is necessary for the continuing treatment of the diagnosis listed and that she requires {Admit to Appropriate Level of Care:55852} for {GREATER/LESS:965128950} 30 days.      Update Admission H&P: {[facility]9557[facility]}    PHYSICIAN

## 2020-08-12 NOTE — ED NOTES
PT. Presents to room 23 in the ED with her  at bedside with c/o irregular eartbeat. Pt. States she feels as if she is having heart palpitations. Pt. States she was here 5 years ago with similar feelings but then was diagnosed with HTN. Pt. Denies any other cardiac hx at this time. Pt. Reports nausea, and dizziness and feels as if the room is spinning. Pt. Reports taking colace for constipation and feels constipated lately. Pt. Reports a gastric bypass a little over a year ago but has had no complications of the surgery. Pt resting on stretcher, no respiratory distress noted, pt updated on plan of care, will continue to monitor, call light in reach.        Magno Anthony RN  08/12/20 3113

## 2020-08-12 NOTE — ED PROVIDER NOTES
of anxiety, Hypertension, Migraine, Neuropathy, Obesities, morbid ([address] TINO (obstructive sleep apnea), and Wears glasses. has a past surgical history that includes Tubal ligation; Upper gastrointestinal endoscopy (N/A, 12/7/2018); Deary tooth extraction; Sukhjinder-en-Y Gastric Bypass (04/01/2019); Sukhjinder-en-Y Gastric Bypass (N/A, 4/1/2019); Upper gastrointestinal endoscopy (N/A, 6/14/2019); Abdomen surgery; Cholecystectomy, laparoscopic (10/07/2019); and Cholecystectomy, laparoscopic (N/A, 10/7/2019).     Social History     Socioeconomic History    Marital status:      Spouse name: Not on file    Number of children: Not on file    Years of education: Not on file    Highest education level: Not on file   Occupational History    Not on file   Social Needs    Financial resource strain: Not on file    Food insecurity     Worry: Not on file     Inability: Not on file    Transportation needs     Medical: Not on file     Non-medical: Not on file   Tobacco Use    Smoking status: Passive Smoke Exposure - Never Smoker    Smokeless tobacco: Never Used   Substance and Sexual Activity    Alcohol use: Yes     Comment: social     Drug use: No    Sexual activity: Yes   Lifestyle    Physical activity     Days per week: Not on file     Minutes per session: Not on file    Stress: Not on file   Relationships    Social connections     Talks on phone: Not on file     Gets together: Not on file     Attends [employer] service: Not on file     Active member of club or organization: Not on file     Attends meetings of clubs or organizations: Not on file     Relationship status: Not on file    Intimate partner violence     Fear of current or ex partner: Not on file     Emotionally abused: Not on file     Physically abused: Not on file     Forced sexual activity: Not on file   Other Topics Concern    Not on file   Social History Narrative    Not on file       Family History   Problem Relation Age of Onset    Thyroid Disease Mother     Diabetes Mother     Heart Disease Mother     High Blood Pressure Mother    Jonas Migraines Mother     Diabetes Father     Heart Disease Father     Thyroid Disease Father     Liver Disease Father     High Blood Pressure Father     Cancer Father         liver    Thyroid Disease Sister     Neuropathy Sister     High Blood Pressure Sister     Kidney Disease Paternal Aunt     Heart Disease Paternal Uncle     Migraines Maternal Grandmother     Stroke Maternal Grandfather     Cancer Maternal Grandfather         breast    Other Paternal Grandmother         epilepsy    Diabetes Paternal Grandmother     High Blood Pressure Paternal Grandmother     Diabetes Paternal Grandfather     High Blood Pressure Paternal Grandfather     Heart Disease Paternal Grandfather        Allergies:  Latex    Home Medications:  Prior to Admission medications    Medication Sig Start Date End Date Taking?  Authorizing Provider   promethazine (PHENERGAN) 25 MG tablet Take 1 tablet by mouth every 8 hours as needed for Nausea 8/3/20   Clarise Huge, DO   docusate sodium (COLACE) 100 MG capsule Take 1 capsule by mouth 2 times daily as needed for Constipation 8/3/20   Clarise Huge, DO   sucralfate (CARAFATE) 1 GM tablet Take 1 tablet by mouth 4 times daily 5/28/20   Clarise Huge, DO   Multiple Vitamins-Minerals (CELEBRATE MULTI-COMPLETE 36) CHEW Take 1 tablet by mouth 2 times daily 5/15/20   Jovita Viveros MD   calcium carbonate (TUMS) 500 MG chewable tablet Take 1 tablet by mouth 2 times daily 5/15/20   Jovita Viveros MD   pantoprazole (PROTONIX) 40 MG tablet Take 1 tablet by mouth daily 5/15/20   Jovita Viveros MD   QUEtiapine (SEROQUEL) 50 MG tablet Take 1 tablet by mouth nightly 5/15/20   Jovita Viveros MD   hydroCHLOROthiazide (HYDRODIURIL) 12.5 MG tablet Take 1 tablet by mouth daily 5/15/20   Jovita Viveros MD       REVIEW OF SYSTEMS    (2-9 systems for level 4, 10 or more for level 5) Review of Systems   Constitutional: Negative for chills and fever. HENT: Negative for congestion. Eyes: Negative for visual disturbance. Respiratory: Negative for cough, shortness of breath and wheezing. Cardiovascular: Negative for chest pain. Gastrointestinal: Positive for constipation. Negative for abdominal pain, nausea and vomiting. Genitourinary: Negative for dysuria and hematuria. Musculoskeletal: Negative for neck pain and neck stiffness. Skin: Negative for rash. Neurological: Negative for dizziness, weakness, numbness and headaches. PHYSICAL EXAM   (up to 7 for level 4, 8 or more for level 5)      INITIAL VITALS:   /84   Pulse 81   Temp 99 °F (37.2 °C) (Oral)   Resp 16   Ht 4' 11\" (1.499 m)   Wt 164 lb (74.4 kg)   LMP 07/14/2020 (Exact Date)   SpO2 98%   BMI 33.12 kg/m²     Physical Exam  Constitutional:       General: She is not in acute distress. Appearance: Normal appearance. She is not ill-appearing or toxic-appearing. HENT:      Head: Normocephalic and atraumatic. Nose: Nose normal.      Mouth/Throat:      Mouth: Mucous membranes are dry. Eyes:      Extraocular Movements: Extraocular movements intact. Conjunctiva/sclera: Conjunctivae normal.      Pupils: Pupils are equal, round, and reactive to light. Comments: No nystagmus    Neck:      Musculoskeletal: Normal range of motion. No muscular tenderness. Cardiovascular:      Rate and Rhythm: Normal rate and regular rhythm. Pulses: Normal pulses. Pulmonary:      Effort: Pulmonary effort is normal. No respiratory distress. Breath sounds: No stridor. No wheezing, rhonchi or rales. Abdominal:      General: There is no distension. Palpations: Abdomen is soft. Tenderness: There is no abdominal tenderness. There is no guarding or rebound. Musculoskeletal: Normal range of motion. General: No swelling or deformity.       Comments: No peripheral edema or calf tenderness   Skin:     General: Skin is warm and dry. Findings: No rash. Neurological:      General: No focal deficit present. Mental Status: She is alert and oriented to person, place, and time. Sensory: No sensory deficit. Motor: No weakness.    Psychiatric:         Behavior: Behavior normal.         DIFFERENTIAL  DIAGNOSIS     PLAN (LABS / IMAGING / EKG):  Orders Placed This Encounter   Procedures    XR CHEST (2 VW)    CBC Auto Differential    Troponin    TSH with Reflex    Calcium, Ionized    MAGNESIUM    PHOSPHORUS    HCG Qualitative, Serum    Basic Metabolic Panel    EKG 12 Lead       MEDICATIONS ORDERED:  Orders Placed This Encounter   Medications    0.9 % sodium chloride bolus         DIAGNOSTIC RESULTS / EMERGENCY DEPARTMENT COURSE / MDM     Results for orders placed or performed during the hospital encounter of 08/12/20   CBC Auto Differential   Result Value Ref Range    WBC 8.3 3.5 - 11.3 k/uL    RBC 4.92 3.95 - 5.11 m/uL    Hemoglobin 15.6 (H) 11.9 - 15.1 g/dL    Hematocrit 47.9 (H) 36.3 - 47.1 %    MCV 97.4 82.6 - 102.9 fL    MCH 31.7 25.2 - 33.5 pg    MCHC 32.6 28.4 - 34.8 g/dL    RDW 13.2 11.8 - 14.4 %    Platelets 093 769 - 569 k/uL    MPV 12.5 8.1 - 13.5 fL    NRBC Automated 0.0 0.0 per 100 WBC    Differential Type NOT REPORTED     Seg Neutrophils 60 36 - 65 %    Lymphocytes 30 24 - 43 %    Monocytes 6 3 - 12 %    Eosinophils % 3 1 - 4 %    Basophils 1 0 - 2 %    Immature Granulocytes 0 0 %    Segs Absolute 5.01 1.50 - 8.10 k/uL    Absolute Lymph # 2.45 1.10 - 3.70 k/uL    Absolute Mono # 0.49 0.10 - 1.20 k/uL    Absolute Eos # 0.23 0.00 - 0.44 k/uL    Basophils Absolute 0.05 0.00 - 0.20 k/uL    Absolute Immature Granulocyte <0.03 0.00 - 0.30 k/uL    WBC Morphology NOT REPORTED     RBC Morphology NOT REPORTED     Platelet Estimate NOT REPORTED    Troponin   Result Value Ref Range    Troponin, High Sensitivity <6 0 - 14 ng/L    Troponin T NOT REPORTED <0.03 ng/mL Troponin Interp NOT REPORTED    TSH with Reflex   Result Value Ref Range    TSH 1.32 0.30 - 5.00 mIU/L   Calcium, Ionized   Result Value Ref Range    Calcium, Ion 1.22 1.13 - 1.33 mmol/L   MAGNESIUM   Result Value Ref Range    Magnesium 2.1 1.6 - 2.6 mg/dL   PHOSPHORUS   Result Value Ref Range    Phosphorus 4.2 2.6 - 4.5 mg/dL   HCG Qualitative, Serum   Result Value Ref Range    hCG Qual NEGATIVE NEGATIVE   Basic Metabolic Panel   Result Value Ref Range    Glucose 97 70 - 99 mg/dL    BUN 10 6 - 20 mg/dL    CREATININE 0.54 0.50 - 0.90 mg/dL    Bun/Cre Ratio NOT REPORTED 9 - 20    Calcium 9.3 8.6 - 10.4 mg/dL    Sodium 136 135 - 144 mmol/L    Potassium 3.9 3.7 - 5.3 mmol/L    Chloride 102 98 - 107 mmol/L    CO2 21 20 - 31 mmol/L    Anion Gap 13 9 - 17 mmol/L    GFR Non-African American >60 >60 mL/min    GFR African American >60 >60 mL/min    GFR Comment          GFR Staging NOT REPORTED          RADIOLOGY:  XR CHEST (2 VW)   Final Result   No acute process. EKG  EKG Interpretation    Interpreted by me    Rhythm: normal sinus   Rate: normal  Axis: normal  Ectopy: none  Conduction: normal  ST Segments: no acute change  T Waves: no acute change  Q Waves: none    Clinical Impression: no acute changes and normal EKG    All EKG's are interpreted by the Emergency Department Physician who either signs or Co-signs this chart in the absence of a cardiologist.    IMPRESSION/MDM/EMERGENCY DEPARTMENT COURSE:  Patient came to emergency department, HPI and physical exam were conducted. All nursing notes were reviewed. 81-year-old female present emergency department complaints of palpitations and dizziness. Intermittent for the past 4 days, related to position changes and exertion. History of significant salt intake daily. Patient was screened and has no clinical signs or symptoms of a CoVID-19 infection at this time.   However, given current pandemic and atypical presentations, face mask, eye protection, surgical cap, and gloves were worn during examination. Patient was wearing surgical mask. Vitals within normal limits. Patient sitting in bed in no acute distress. Alert and oriented x3. Heart regular rate and without murmur. Lungs are clear to auscultate bilateral without wheezes rales or rhonchi. Abdomen soft, nontender nondistended. No peripheral edema or calf tenderness. Remainder of exam is unremarkable. Concern for electrolyte abnormality resulting in dysrhythmia and palpitations. Will obtain BMP as well as ionized calcium, phosphorus, magnesium. Also obtain cardiac work-up including CBC, troponin, EKG, chest x-ray. Low suspicion for PE at this time. Patient is PERC negative. Will give fluids for likely dehydration and dry oral mucosa. If work-up is negative will consider discharge if patient is able to ambulate without recurrence of symptoms. ED Course as of Aug 12 1939   Wed Aug 12, 2020   1817 WBC: 8.3 [ZT]   1817 hCG Qual: NEGATIVE [ZT]   1817 Calcium, Ion: 1.22 [ZT]   1818 Calcium, Ion: 1.22 [ZT]   1851 Updated patient on results. All questions answered. Electrolytes within normal limits. CBC unremarkable with no leukocytosis and hemoglobin within normal limits. TSH within normal limits. Pregnancy negative. Patient's headache has improved and her dizziness has nearly resolved. Able to stand and ambulate to restroom with no symptoms. Discussed plan to discharge home and follow-up with PCP as well as strict return precautions. Patient and family member agreeable. [ZT]      ED Course User Index  [ZT] Soraya Solorzano DO         FINAL IMPRESSION      1. Palpitations    2. Dizziness    3.  Dehydration          DISPOSITION / PLAN     DISPOSITION Decision To Discharge 08/12/2020 07:05:39 PM      PATIENT REFERRED TO:  Brain Hamilton MD  [address] [phone]    Schedule an appointment as soon as possible for a visit       Ventura Hamman

## 2020-08-12 NOTE — ED NOTES
Pt. Resting in room 23. Pt. Updated on plan of care. Will continue to monitor. No other questions at this time. Call light within reach and bed in lowest position.       Omar Johnson RN  08/12/20 4810

## 2020-08-13 LAB
EKG ATRIAL RATE: 70 BPM
EKG P AXIS: 44 DEGREES
EKG P-R INTERVAL: 178 MS
EKG Q-T INTERVAL: 404 MS
EKG QRS DURATION: 100 MS
EKG QTC CALCULATION (BAZETT): 436 MS
EKG R AXIS: 21 DEGREES
EKG T AXIS: 23 DEGREES
EKG VENTRICULAR RATE: 70 BPM

## 2020-08-13 PROCEDURE — 93010 ELECTROCARDIOGRAM REPORT: CPT | Performed by: INTERNAL MEDICINE

## 2020-08-31 ENCOUNTER — TELEPHONE (OUTPATIENT)
Dept: INTERNAL MEDICINE | Age: 40
End: 2020-08-31

## 2020-08-31 NOTE — LETTER
[address]  [address] [address]  Phone: [phone]  Fax: [phone]    Molly Cullen MD        August 31, 2020    Adrianne Miranda  [address] [place] [address]      Dear Armani Napier: We are sending this letter because your PCP ordered [facility] for you to have done at your last visit here and they have not yet been completed. If you can please come to our office on the 2nd floor to  your orders to have them compelted. If you do not have a follow-up appointment scheduled you can either contact the office to make an appointment with us or you can make one when you come in to pick-up your orders. If you have any questions or concerns, please don't hesitate to call.     Sincerely,        Molly Cullen MD

## 2020-09-09 ENCOUNTER — NURSE ONLY (OUTPATIENT)
Dept: BARIATRICS/WEIGHT MGMT | Age: 40
End: 2020-09-09

## 2020-09-10 ENCOUNTER — TELEPHONE (OUTPATIENT)
Dept: BARIATRICS/WEIGHT MGMT | Age: 40
End: 2020-09-10

## 2020-09-10 VITALS — TEMPERATURE: 96.8 F

## 2020-09-11 ENCOUNTER — HOSPITAL ENCOUNTER (OUTPATIENT)
Age: 40
Discharge: HOME | End: 2020-09-11
Payer: MEDICARE

## 2020-09-11 LAB
ABSOLUTE EOS #: 0.15 K/UL (ref 0–0.44)
ABSOLUTE IMMATURE GRANULOCYTE: <0.03 K/UL (ref 0–0.3)
ABSOLUTE LYMPH #: 1.76 K/UL (ref 1.1–3.7)
ABSOLUTE MONO #: 0.24 K/UL (ref 0.1–1.2)
ALBUMIN SERPL-MCNC: 3.9 G/DL (ref 3.5–5.2)
ALBUMIN/GLOBULIN RATIO: 1.6 (ref 1–2.5)
ALP BLD-CCNC: 85 U/L (ref 35–104)
ALT SERPL-CCNC: 20 U/L (ref 5–33)
ANION GAP SERPL CALCULATED.3IONS-SCNC: 9 MMOL/L (ref 9–17)
AST SERPL-CCNC: 17 U/L
BASOPHILS # BLD: 1 % (ref 0–2)
BASOPHILS ABSOLUTE: 0.05 K/UL (ref 0–0.2)
BILIRUB SERPL-MCNC: 0.54 MG/DL (ref 0.3–1.2)
BUN BLDV-MCNC: 10 MG/DL (ref 6–20)
BUN/CREAT BLD: ABNORMAL (ref 9–20)
CALCIUM SERPL-MCNC: 8.4 MG/DL (ref 8.6–10.4)
CHLORIDE BLD-SCNC: 107 MMOL/L (ref 98–107)
CHOLESTEROL/HDL RATIO: 2.9
CHOLESTEROL: 120 MG/DL
CO2: 24 MMOL/L (ref 20–31)
CREAT SERPL-MCNC: 0.55 MG/DL (ref 0.5–0.9)
DIFFERENTIAL TYPE: NORMAL
EOSINOPHILS RELATIVE PERCENT: 3 % (ref 1–4)
ESTIMATED AVERAGE GLUCOSE: 91 MG/DL
FOLATE: 17 NG/ML
GFR AFRICAN AMERICAN: >60 ML/MIN
GFR NON-AFRICAN AMERICAN: >60 ML/MIN
GFR SERPL CREATININE-BSD FRML MDRD: ABNORMAL ML/MIN/{1.73_M2}
GFR SERPL CREATININE-BSD FRML MDRD: ABNORMAL ML/MIN/{1.73_M2}
GLUCOSE BLD-MCNC: 83 MG/DL (ref 70–99)
HBA1C MFR BLD: 4.8 % (ref 4–6)
HCT VFR BLD CALC: 44.2 % (ref 36.3–47.1)
HDLC SERPL-MCNC: 42 MG/DL
HEMOGLOBIN: 14.4 G/DL (ref 11.9–15.1)
IMMATURE GRANULOCYTES: 0 %
IRON SATURATION: 19 % (ref 20–55)
IRON: 55 UG/DL (ref 37–145)
LDL CHOLESTEROL: 55 MG/DL (ref 0–130)
LYMPHOCYTES # BLD: 36 % (ref 24–43)
MAGNESIUM: 2 MG/DL (ref 1.6–2.6)
MCH RBC QN AUTO: 31.4 PG (ref 25.2–33.5)
MCHC RBC AUTO-ENTMCNC: 32.6 G/DL (ref 28.4–34.8)
MCV RBC AUTO: 96.3 FL (ref 82.6–102.9)
MONOCYTES # BLD: 5 % (ref 3–12)
NRBC AUTOMATED: 0 PER 100 WBC
PDW BLD-RTO: 13 % (ref 11.8–14.4)
PLATELET # BLD: 178 K/UL (ref 138–453)
PLATELET ESTIMATE: NORMAL
PMV BLD AUTO: 11.5 FL (ref 8.1–13.5)
POTASSIUM SERPL-SCNC: 4.7 MMOL/L (ref 3.7–5.3)
PTH INTACT: 108.7 PG/ML (ref 15–65)
RBC # BLD: 4.59 M/UL (ref 3.95–5.11)
RBC # BLD: NORMAL 10*6/UL
SEG NEUTROPHILS: 55 % (ref 36–65)
SEGMENTED NEUTROPHILS ABSOLUTE COUNT: 2.63 K/UL (ref 1.5–8.1)
SODIUM BLD-SCNC: 140 MMOL/L (ref 135–144)
TOTAL IRON BINDING CAPACITY: 296 UG/DL (ref 250–450)
TOTAL PROTEIN: 6.4 G/DL (ref 6.4–8.3)
TRIGL SERPL-MCNC: 114 MG/DL
TSH SERPL DL<=0.05 MIU/L-ACNC: 1.11 MIU/L (ref 0.3–5)
UNSATURATED IRON BINDING CAPACITY: 241 UG/DL (ref 112–347)
VITAMIN B-12: 388 PG/ML (ref 232–1245)
VITAMIN D 25-HYDROXY: 15.6 NG/ML (ref 30–100)
VLDLC SERPL CALC-MCNC: NORMAL MG/DL (ref 1–30)
WBC # BLD: 4.8 K/UL (ref 3.5–11.3)
WBC # BLD: NORMAL 10*3/UL

## 2020-09-11 PROCEDURE — 82746 ASSAY OF FOLIC ACID SERUM: CPT

## 2020-09-11 PROCEDURE — 83970 ASSAY OF PARATHORMONE: CPT

## 2020-09-11 PROCEDURE — 82306 VITAMIN D 25 HYDROXY: CPT

## 2020-09-11 PROCEDURE — 82607 VITAMIN B-12: CPT

## 2020-09-11 PROCEDURE — 83550 IRON BINDING TEST: CPT

## 2020-09-11 PROCEDURE — 83540 ASSAY OF IRON: CPT

## 2020-09-11 PROCEDURE — 80061 LIPID PANEL: CPT

## 2020-09-11 PROCEDURE — 83036 HEMOGLOBIN GLYCOSYLATED A1C: CPT

## 2020-09-11 PROCEDURE — 84425 ASSAY OF VITAMIN B-1: CPT

## 2020-09-11 PROCEDURE — 84443 ASSAY THYROID STIM HORMONE: CPT

## 2020-09-11 PROCEDURE — 84590 ASSAY OF VITAMIN A: CPT

## 2020-09-11 PROCEDURE — 80053 COMPREHEN METABOLIC PANEL: CPT

## 2020-09-11 PROCEDURE — 36415 COLL VENOUS BLD VENIPUNCTURE: CPT

## 2020-09-11 PROCEDURE — 85025 COMPLETE CBC W/AUTO DIFF WBC: CPT

## 2020-09-11 PROCEDURE — 83735 ASSAY OF MAGNESIUM: CPT

## 2020-09-13 RX ORDER — ERGOCALCIFEROL 1.25 MG/1
50000 CAPSULE ORAL WEEKLY
Qty: 8 CAPSULE | Refills: 0 | Status: SHIPPED | OUTPATIENT
Start: 2020-09-13 | End: 2020-11-19

## 2020-09-15 ENCOUNTER — TELEPHONE (OUTPATIENT)
Dept: BARIATRICS/WEIGHT MGMT | Age: 40
End: 2020-09-15

## 2020-09-15 NOTE — TELEPHONE ENCOUNTER
Prior auth requried for linzess, or change to one of these medicatoins:    Generic therapeutic alternatives for Linzess are available  Lactulose   Sorbitol   MiraLax   Metamucil   Senna   Bisacodyl   Milk of Magnesia      Change med or complete PA?

## 2020-09-16 LAB — VITAMIN B1 WHOLE BLOOD: 172 NMOL/L (ref 70–180)

## 2020-09-17 LAB
RETINYL PALMITATE: <0.02 MG/L (ref 0–0.1)
VITAMIN A LEVEL: 0.41 MG/L (ref 0.3–1.2)
VITAMIN A, INTERP: NORMAL

## 2020-09-21 RX ORDER — PROMETHAZINE HYDROCHLORIDE 25 MG/1
25 TABLET ORAL EVERY 8 HOURS PRN
Qty: 30 TABLET | Refills: 2 | Status: SHIPPED | OUTPATIENT
Start: 2020-09-21 | End: 2021-03-09 | Stop reason: SDUPTHER

## 2020-09-21 RX ORDER — DOCUSATE SODIUM 100 MG/1
100 CAPSULE, LIQUID FILLED ORAL 2 TIMES DAILY PRN
Qty: 60 CAPSULE | Refills: 3 | Status: SHIPPED | OUTPATIENT
Start: 2020-09-21 | End: 2021-01-21 | Stop reason: SDUPTHER

## 2020-09-21 RX ORDER — PANTOPRAZOLE SODIUM 40 MG/1
40 TABLET, DELAYED RELEASE ORAL DAILY
Qty: 60 TABLET | Refills: 3 | Status: SHIPPED | OUTPATIENT
Start: 2020-09-21 | End: 2021-01-21 | Stop reason: SDUPTHER

## 2020-09-24 ENCOUNTER — OFFICE VISIT (OUTPATIENT)
Dept: INTERNAL MEDICINE | Age: 40
End: 2020-09-24
Payer: MEDICARE

## 2020-09-24 VITALS
DIASTOLIC BLOOD PRESSURE: 88 MMHG | HEIGHT: 57 IN | BODY MASS INDEX: 34.95 KG/M2 | HEART RATE: 78 BPM | SYSTOLIC BLOOD PRESSURE: 132 MMHG | WEIGHT: 162 LBS

## 2020-09-24 PROCEDURE — G8417 CALC BMI ABV UP PARAM F/U: HCPCS | Performed by: INTERNAL MEDICINE

## 2020-09-24 PROCEDURE — 99214 OFFICE O/P EST MOD 30 MIN: CPT | Performed by: INTERNAL MEDICINE

## 2020-09-24 PROCEDURE — 1036F TOBACCO NON-USER: CPT | Performed by: INTERNAL MEDICINE

## 2020-09-24 PROCEDURE — G8427 DOCREV CUR MEDS BY ELIG CLIN: HCPCS | Performed by: INTERNAL MEDICINE

## 2020-09-24 PROCEDURE — 99211 OFF/OP EST MAY X REQ PHY/QHP: CPT | Performed by: INTERNAL MEDICINE

## 2020-09-24 RX ORDER — SPIRONOLACTONE 25 MG/1
TABLET ORAL
COMMUNITY
Start: 2020-08-07 | End: 2020-11-19

## 2020-09-24 RX ORDER — ERGOCALCIFEROL 1.25 MG/1
50000 CAPSULE ORAL WEEKLY
Qty: 8 CAPSULE | Refills: 0 | Status: SHIPPED | OUTPATIENT
Start: 2020-09-24 | End: 2020-11-19

## 2020-09-24 RX ORDER — FERROUS SULFATE 325(65) MG
325 TABLET ORAL
Qty: 90 TABLET | Refills: 1 | Status: SHIPPED | OUTPATIENT
Start: 2020-09-24 | End: 2020-11-19

## 2020-09-24 RX ORDER — ESCITALOPRAM OXALATE 10 MG/1
10 TABLET ORAL DAILY
Qty: 30 TABLET | Refills: 3 | Status: SHIPPED | OUTPATIENT
Start: 2020-09-24 | End: 2020-11-19

## 2020-09-24 RX ORDER — LACTULOSE 10 G/15ML
10 SOLUTION ORAL 3 TIMES DAILY
Qty: 1 BOTTLE | Refills: 1 | Status: SHIPPED | OUTPATIENT
Start: 2020-09-24 | End: 2020-11-13 | Stop reason: SDUPTHER

## 2020-09-24 RX ORDER — MEDICAL SUPPLY, MISCELLANEOUS
1 EACH MISCELLANEOUS DAILY
Qty: 1 EACH | Refills: 0 | Status: SHIPPED | OUTPATIENT
Start: 2020-09-24 | End: 2020-11-19

## 2020-09-24 ASSESSMENT — ENCOUNTER SYMPTOMS
RESPIRATORY NEGATIVE: 1
GASTROINTESTINAL NEGATIVE: 1

## 2020-09-24 NOTE — PROGRESS NOTES
[facility]      Date of Patient's Visit: 9/24/2020    Progress note    Patient Care Team:  Michelle Jasso MD as PCP - Jennifer Michel MD as PCP - [facility] Empaneled Provider  Bina Tejeda MD as Consulting Physician (Pulmonology)      [address] [place] [address]  Chief Complaint   Patient presents with    Anxiety     medication not helping, not seeing phscy    Constipation       SUBJECTIVE  David Evangelista is a 46 y.o. female who presents for f/u of bipolar/ anxiety disorder . mood disorder , social phobia, anxiety and panic   Insomnia, crying spells , easy irritability , difficulty organizing her thoughts   She is unable to go out of her house at all and this is affecting her life  We tried a low dose seroquel last visit but she states that made her SHASHI worse. She stopped using it altogether  Also she is worried about the weight gain from her psych medications     She has previous h/o gastric bypass surgery, labs show iron deficiency , calcium deficiency , vit d deficiency and sec hyperparathyroidism     + severe constipation : tried miralax, citrate, docusate to no relief   Eats fruits and vegetables and drinks >60 oz of water a day   linaclotide was not covered by her insurance     Hot flashes : episodes of ho flashes associated with shakiness and dizziness , dry mouth . These episodes often lead her in to an anxiety attack   She does not check her BP AT THAT time   tsh was normal   She gets regular but heavy periods     BP is well controlled     ROS  All other review of systems negative, except for those noted. Review of Systems   Constitutional: Negative. HENT: Negative. Respiratory: Negative. Cardiovascular: Negative. Gastrointestinal: Negative. Genitourinary: Negative. Musculoskeletal: Negative. Neurological: Negative. Psychiatric/Behavioral: Negative.         Past Medical History:   Diagnosis Date    Gastric reflux     History of History     Socioeconomic History    Marital status:      Spouse name: None    Number of children: None    Years of education: None    Highest education level: None   Occupational History    None   Social Needs    Financial resource strain: None    Food insecurity     Worry: None     Inability: None    Transportation needs     Medical: None     Non-medical: None   Tobacco Use    Smoking status: Passive Smoke Exposure - Never Smoker    Smokeless tobacco: Never Used   Substance and Sexual Activity    Alcohol use: Yes     Comment: social     Drug use: No    Sexual activity: Yes   Lifestyle    Physical activity     Days per week: None     Minutes per session: None    Stress: None   Relationships    Social connections     Talks on phone: None     Gets together: None     Attends [employer] service: None     Active member of club or organization: None     Attends meetings of clubs or organizations: None     Relationship status: None    Intimate partner violence     Fear of current or ex partner: None     Emotionally abused: None     Physically abused: None     Forced sexual activity: None   Other Topics Concern    None   Social History Narrative    None       Current Outpatient Medications   Medication Sig Dispense Refill    spironolactone (ALDACTONE) 25 MG tablet take 1 tablet by mouth once daily      lactulose (CHRONULAC) 10 GM/15ML solution Take 15 mLs by mouth 3 times daily 1 Bottle 1    magnesium hydroxide (MILK OF MAGNESIA) 400 MG/5ML suspension Take 30 mLs by mouth daily as needed for Constipation 1 Bottle 3    vitamin D (ERGOCALCIFEROL) 1.25 MG (02339 UT) CAPS capsule Take 1 capsule by mouth once a week 8 capsule 0    ferrous sulfate (IRON 325) 325 (65 Fe) MG tablet Take 1 tablet by mouth daily (with breakfast) 90 tablet 1    Blood Pressure Monitoring (B-D ASSURE BPM/AUTO ARM CUFF) MISC 1 Device by Does not apply route daily 1 each 0    escitalopram (LEXAPRO) 10 MG tablet Take 1 index is 35.06 kg/m². RESULTS    Lab Findings    CBC:   Lab Results   Component Value Date    WBC 4.8 09/11/2020    HGB 14.4 09/11/2020     09/11/2020     BMP:   Lab Results   Component Value Date     09/11/2020    K 4.7 09/11/2020     09/11/2020    CO2 24 09/11/2020    BUN 10 09/11/2020    CREATININE 0.55 09/11/2020    GLUCOSE 83 09/11/2020     HEMOGLOBIN A1C:   Lab Results   Component Value Date    LABA1C 4.8 09/11/2020     MICROALBUMIN URINE: No results found for: MICROALBUR  FASTING LIPID PANEL:   Lab Results   Component Value Date    CHOL 120 09/11/2020    HDL 42 09/11/2020    TRIG 114 09/11/2020     Lab Results   Component Value Date    LDLCHOLESTEROL 55 09/11/2020     LIVER PROFILE:   Lab Results   Component Value Date    ALT 20 09/11/2020    AST 17 09/11/2020    PROT 6.4 09/11/2020    BILITOT 0.54 09/11/2020    BILIDIR 0.22 06/13/2019    LABALBU 3.9 09/11/2020      THYROID FUNCTION:   Lab Results   Component Value Date    TSH 1.11 09/11/2020      URINE ANALYSIS: No results found for: [address]    1. Slow transit constipation    - lactulose (CHRONULAC) 10 GM/15ML solution; Take 15 mLs by mouth 3 times daily  Dispense: 1 Bottle; Refill: 1  - magnesium hydroxide (MILK OF MAGNESIA) 400 MG/5ML suspension; Take 30 mLs by mouth daily as needed for Constipation  Dispense: 1 Bottle; Refill: 3    2. Vitamin D deficiency    - vitamin D (ERGOCALCIFEROL) 1.25 MG (19293 UT) CAPS capsule; Take 1 capsule by mouth once a week  Dispense: 8 capsule; Refill: 0    3. Generalized anxiety disorder with panic attacks    - [facility]  - escitalopram (LEXAPRO) 10 MG tablet; Take 1 tablet by mouth daily  Dispense: 30 tablet; Refill: 3    4. Iron deficiency anemia due to chronic blood loss    - ferrous sulfate (IRON 325) 325 (65 Fe) MG tablet; Take 1 tablet by mouth daily (with breakfast)  Dispense: 90 tablet; Refill: 1    5.  Essential hypertension    - Blood Pressure Monitoring (B-D ASSURE BPM/AUTO ARM CUFF) MISC; 1 Device by Does not apply route daily  Dispense: 1 each; Refill: 0    Plan ;   Will start her on lexapro for SHASHI, psych referral for psychotherapy   She is advised to check her BP at the time of hot flashes event: to evaluate the etiology of the sympathetic surge at that time ? Pheochromocytoma  If an endochrine cause is ruled out then will switch her antihypertensive medications to clonidine to achieve a synergistic effect for anxiety, restlessness and hot flashes  RTC IN 3 -4 months            Follow up Instructions:    1. Return in about 6 months (around 3/24/2021). 2. Reviewed prior labs and health maintenance. 3. Discussed use, benefit, and side effects of prescribed medications. Barriers to medication compliance addressed. All patient questions answered. Pt voiced understanding.      4. Patient given educational materials - see patient instructions      MD DONAL Gamez  Attending Physician, [address] [place] [address], Internal Medicine Residency Program  [address] [place] [address]  9/24/2020, 10:46 AM

## 2020-09-24 NOTE — PATIENT INSTRUCTIONS
Medications e-scribe to pharmacy of pt's choice. Referral to Psychiatry given to pt along with some locations, pt is to call and make appt at place of choice. Printed script for BP Cuff given to pt. Pt was added to wait list for 6 months  An After Visit Summary was printed and given to the patient.   CB

## 2020-10-13 ENCOUNTER — TELEMEDICINE (OUTPATIENT)
Dept: PSYCHOLOGY | Age: 40
End: 2020-10-13
Payer: MEDICARE

## 2020-10-13 PROBLEM — F31.81: Status: ACTIVE | Noted: 2020-10-13

## 2020-10-13 PROCEDURE — 90791 PSYCH DIAGNOSTIC EVALUATION: CPT | Performed by: BEHAVIOR ANALYST

## 2020-10-13 NOTE — PROGRESS NOTES
ADULT BEHAVIORAL HEALTH ASSESSMENT  Elida Carvalho Psy.D. Licensed Clinical Psychologist [facility])    Visit Date: 10/13/2020   Time of appointment:  2:00pm     Time spent with Patient: 50 minutes. This is patient's first appointment. Reason for Consult:  Depression; Anxiety; and Panic Attack     Referring Provider/PCP:    No ref. provider found  Gaurang Acosta MD      This was a virtual visit using audio & video technology through [employer]. Pt provided informed consent for the behavioral health program and for using telehealth. Discussed with patient model of service to include the limits of confidentiality (i.e. abuse reporting, suicide intervention, etc.) and short-term intervention focused approach. Pt indicated understanding. PRESENTING PROBLEM AND HISTORY  Krzysztof Blackmon is a 46 y.o. female who presents for new evaluation and treatment of anxiety, depression, suicidal thoughts, sleep difficulties, manic symptoms. She reported periods of depression which include the following symptoms: depressed mood, anhedonia, decreased appetite with weight loss, decreased sleep, fatigue/lack of energy, excessive crying, lack of motivation, excessive guilt, low self-esteem, isolating self, feelings of worthlessness, and occasional suicidal thoughts without plan. She reported feeling as if \"people would be better off without me\" but noted that she lives for her  and her children. She reported that about one month ago, she experienced these thoughts with a vague plan, although denied intent or any attempts at that time. She also noted periods of hypomania lasting about 4 days in which she experiences the following symptoms: anger/irritability, mood swings, having more energy than usual, increase in goal directed activity, racing thoughts/flight of ideas, and a general feeling of being \"on top of the world. \" Additionally, she reported anxiety in which she has racing thoughts/anxiety, and panic attacks (characterized by increased heart rate, sweating, trembling, shortness of breath, and chest pain) when she leaves her home. She reported that these symptoms are improved when her  accompanies her. Onset of symptoms was approximately 6 months ago. Symptoms have been gradually worsening since that time. She denies current suicidal and homicidal ideation. Family history significant for alcoholism and depression. Risk factors: positive family history in  father and sister(s), previous episode of depression and previous suicide attempts, previous episodes of hypomania. Protective factors: motivation for psychotherapy/treatment, hopefulness for symptom improvement, supportive family, employment. Previous treatment includes Lexapro and Seroquel. She complains of the following medication side effects: dizziness, dry mouth, headache, nausea and weight gain. MENTAL STATUS EXAM  Mood was anxious and depressed with anxious affect. Suicidal ideation was denied. Homicidal ideation was denied. Hygiene was fair . Behavior was Within Normal Limits   Attitude was Cooperative. Eye-contact was not able to assess due to virtual visit  Speech: rate - WNL, rhythm -  WNL, volume - WNL  Verbalizations were goal directed and coherent. Thought processes were intact and goal-oriented without evidence of delusions, hallucinations, obsessions, or current yoav. Associations were characterized by intact cognitive processes. Pt was oriented to person, place, time, and general circumstances;  recent:  good. Insight and judgment were estimated to be fair, AEB, a fair  understanding of cyclical maladaptive patterns, and the ability to use insight to inform behavior change.        CURRENT MEDICATIONS    Current Outpatient Medications:     spironolactone (ALDACTONE) 25 MG tablet, take 1 tablet by mouth once daily, Disp: , Rfl:     lactulose (CHRONULAC) 10 GM/15ML solution, Take 15 mLs by mouth 3 times daily, Disp: 1 Bottle, Rfl: 1    magnesium hydroxide (MILK OF MAGNESIA) 400 MG/5ML suspension, Take 30 mLs by mouth daily as needed for Constipation, Disp: 1 Bottle, Rfl: 3    vitamin D (ERGOCALCIFEROL) 1.25 MG (26545 UT) CAPS capsule, Take 1 capsule by mouth once a week, Disp: 8 capsule, Rfl: 0    ferrous sulfate (IRON 325) 325 (65 Fe) MG tablet, Take 1 tablet by mouth daily (with breakfast), Disp: 90 tablet, Rfl: 1    Blood Pressure Monitoring (B-D ASSURE BPM/AUTO ARM CUFF) MISC, 1 Device by Does not apply route daily, Disp: 1 each, Rfl: 0    escitalopram (LEXAPRO) 10 MG tablet, Take 1 tablet by mouth daily, Disp: 30 tablet, Rfl: 3    pantoprazole (PROTONIX) 40 MG tablet, Take 1 tablet by mouth daily, Disp: 60 tablet, Rfl: 3    promethazine (PHENERGAN) 25 MG tablet, Take 1 tablet by mouth every 8 hours as needed for Nausea, Disp: 30 tablet, Rfl: 2    docusate sodium (COLACE) 100 MG capsule, Take 1 capsule by mouth 2 times daily as needed for Constipation, Disp: 60 capsule, Rfl: 3    vitamin D (ERGOCALCIFEROL) 1.25 MG (26055 UT) CAPS capsule, Take 1 capsule by mouth once a week for 8 doses, Disp: 8 capsule, Rfl: 0    Multiple Vitamins-Minerals (CELEBRATE MULTI-COMPLETE 36) CHEW, Take 1 tablet by mouth 2 times daily, Disp: 60 tablet, Rfl: 5    calcium carbonate (TUMS) 500 MG chewable tablet, Take 1 tablet by mouth 2 times daily, Disp: 60 tablet, Rfl: 5    hydroCHLOROthiazide (HYDRODIURIL) 12.5 MG tablet, Take 1 tablet by mouth daily, Disp: 30 tablet, Rfl: 5     FAMILY MEDICAL/MH HISTORY   Her family history includes Alcohol Abuse in her father; Cancer in her father and maternal grandfather; Depression in her father; Diabetes in her father, mother, paternal grandfather, and paternal grandmother; Heart Disease in her father, mother, paternal grandfather, and paternal uncle; High Blood Pressure in her father, mother, paternal grandfather, paternal grandmother, and sister; Kidney Disease in her paternal aunt;  Liver Disease in her father; Migraines in her maternal grandmother and mother; Neuropathy in her sister; Other in her paternal grandmother; Stroke in her maternal grandfather; Thyroid Disease in her father, mother, and sister. PATIENT MENTAL HEALTH HISTORY  Patient reported a history of bipolar disorder and depression. She stated that she was on both Seroquel and Lexapro in the past but did not like the side effects. Pt stated that she has attempted suicide 3 times in the past, with the most recent attempt being when she was 26years old. At that time, she was hospitalized for 2 days. Pt reported that she had bariatric surgery about 1 year ago and began experiencing depression and anxiety soon after the surgery. She reported that she has engaged in psychotherapy on/off in the past but has not found it helpful. However she reported feeling hopeful for treatment and improving her current symptoms. PSYCHOSOCIAL HISTORY  Current living situation: Pt reported that she currently lives with her  of 20+ years and three teenage children. Work/Education: She stated that she has recently been employed by [employer] although has not started yet. 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 Support system: Pt reported that she receives significant support from her . She has 4 children although stated that her first child was taken by child services soon after birth. She receives support from her three teenage children. SUBSTANCE USE CURRENT USE/HISTORY  TOBACCO:  She reports that she is a non-smoker but has been exposed to tobacco smoke. She has never used smokeless tobacco.  ALCOHOL:  She reports current alcohol use. She reported that for the past few months, she has been drinking a few beers 3-4 times per week. However reported that more recently she has reduced her alcohol intake and has rarely had alcohol in the past 2 weeks. OTHER SUBSTANCES: She reports no history of drug use.    CAFFEINE: She reported that she drinks 1-3 cups of coffee per day. ASSESSMENT/SUMMARY  Cherri Tracey was seen for a virtual visit for evaluation and treatment of symptoms of bipolar disorder. She is considered a low risk to herself or others at this time, given that she denied current suicidal ideation, plan, or intent. However it will be important to continue monitoring this as she reported a history of suicidal ideation with vague plans and previous suicide attempts. Protective factors include her motivation for treatment, her tendency toward forward thinking, and strong social support from family. She presents with symptoms consistent with a diagnosis of Bipolar II Disorder current episode depressed with rapid cycling, as evidenced by: frequent changes in mood, experiences of hypomania (characterized by decreased need for sleep, distractibility, increased activities, and flight of ideas) and depressive episodes (characertized by depressed mood, anhedonia, hypersomnia, suicidal ideation, and reduced energy/fatigue). A diagnosis of Agoraphobia will be ruled out in her next session, as she reported symptoms of panic/anxiety/fear in situations in which she leaves the house. However other symptoms could not be assessed in today's visit and thus will be explored further in her next session. She would likely benefit from a medication evaluation to assess if mood stabilizing medications could be helpful in treating hypomanic symptoms. Pt's symptoms are not well controlled at this time as she reported minimal coping techniques and she is currently not taking any medication for her symptoms. She will likely benefit from brief and solution-focused consultation to address cognitive and behavioral interventions for her symptoms. Pt was in agreement with recommendations.       PHQ Scores 5/15/2020 3/26/2019 8/31/2018 7/26/2016   PHQ2 Score 5 0 0 0   PHQ9 Score 14 0 0 0     Interpretation of Total Score Depression Severity: 1-4 = Minimal depression, 5-9 = Mild depression, 10-14 = Moderate depression, 15-19 = Moderately severe depression, 20-27 = Severe depression    How often pt has had thoughts of death or hurting self (if PHQ positive for depression):       No flowsheet data found. Interpretation of SHASHI-7 score: 5-9 = mild anxiety, 10-14 = moderate anxiety, 15+ = severe anxiety. Recommend referral to behavioral health for scores 10 or greater. DIAGNOSIS  Susanna Andujar was seen today for depression, anxiety and panic attack. Diagnoses and all orders for this visit:    Bipolar II disorder, most recent episode depressed with rapid cycling ([facility] [address]          INTERVENTION  Provided education on the use/importance of medication to treat bipolar disorder  Discussed factors related to the development and maintenance of her current symptoms (I.e., genetic, environmental)  Provided psychoeducation on psychotherapy in treating bipolar disorder  Supportive techniques   Collaborative treatment planning  Informed pt to call 911 or go to her nearest emergency room if she experiences worsening suicidal thoughts. PLAN  Pt scheduled a follow-up psychotherapy visit via telehealth at 10am on 10/20/2020. INTERACTIVE COMPLEXITY  Is interactive complexity present?   No  Additional Supporting Information:  N/A       Electronically signed by Nadine Sanches PSYD on 10/13/20 at 3:29 PM EDT

## 2020-10-20 ENCOUNTER — VIRTUAL VISIT (OUTPATIENT)
Dept: PSYCHOLOGY | Age: 40
End: 2020-10-20
Payer: MEDICARE

## 2020-10-20 PROCEDURE — 90834 PSYTX W PT 45 MINUTES: CPT | Performed by: BEHAVIOR ANALYST
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
## 2020-10-28 ENCOUNTER — TELEMEDICINE (OUTPATIENT)
Dept: INTERNAL MEDICINE | Age: 40
End: 2020-10-28
Payer: MEDICARE

## 2020-10-28 PROBLEM — R55 NEAR SYNCOPE: Status: ACTIVE | Noted: 2020-10-28

## 2020-10-28 PROCEDURE — 99213 OFFICE O/P EST LOW 20 MIN: CPT | Performed by: STUDENT IN AN ORGANIZED HEALTH CARE EDUCATION/TRAINING PROGRAM

## 2020-10-28 RX ORDER — ESCITALOPRAM OXALATE 5 MG/1
5 TABLET ORAL DAILY
Qty: 30 TABLET | Refills: 0 | Status: CANCELLED | OUTPATIENT
Start: 2020-10-28

## 2020-10-28 ASSESSMENT — ENCOUNTER SYMPTOMS: SHORTNESS OF BREATH: 0

## 2020-10-28 NOTE — PROGRESS NOTES
Attending Physician Statement  I have discussed the care of Adebayo Pereira, including pertinent history and exam findings with the resident. I have reviewed the key elements of all parts of the encounter with the resident. I agree with the assessment, and status of the problem list as documented. Felt lightheaded - report \"syncope\" but no LOC. States it all began with starting lexapro- d/c med and symptoms improved. Diagnosis Orders   1. Generalized anxiety disorder with panic attacks     2. Near syncope  Likely anxiety related. Needs office visit for workup- orthostatics etc.      The plan and orders should include No orders of the defined types were placed in this encounter. and this was also documented by the resident. The medication list was reviewed with the resident and is up to date.  F/u with Dr Mohini Tong in office F2F    Dr Rosaura Shields MD, [address]  Associate , Department of Internal Medicine  Resident Ambulatory Site Medical Director  [facility] Internal Medicine  [address] [place] [address] [id number]  Internal Medicine Clerkship - Pop Blackburn    10/28/2020, 1:48 PM

## 2020-10-28 NOTE — PROGRESS NOTES
10/28/2020    TELEHEALTH EVALUATION -- Audio/Visual (During YTTLK-08 public health emergency)    HPI:     Dayna Brady (:  1980) has requested an audio/video evaluation for the following concern(s):    Dizziness and Lightheadedness after starting Lexapro. Admits to 2 episodes of near syncope where she would experience darkening over her vision and fall to the floor quickly regaining vision. She saw Psychology on 10/13/2020 and 10/20/2020. Stopped her Lexapro and her symptoms have resolved. Virtual visit today discussing her symptoms. Admits to palpitations and vision changes prior to her near syncope. Discussed in detail with the patient about her options stating we can reduce the dose of Lexapro to 5 mg daily and see if she tolerates but I would prefer to see her in clinic get vitals including BP and orthostatics as well as cardiac examination and possible EKG. Patient is agreeable to stop Lexapro and be seen in the clinic. Review of Systems   Constitutional: Negative for fever. Eyes: Positive for visual disturbance. Respiratory: Negative for shortness of breath. Cardiovascular: Positive for palpitations. Negative for chest pain. Neurological: Positive for dizziness, syncope and light-headedness. Prior to Visit Medications    Medication Sig Taking?  Authorizing Provider   spironolactone (ALDACTONE) 25 MG tablet take 1 tablet by mouth once daily  Historical Provider, MD   lactulose (CHRONULAC) 10 GM/15ML solution Take 15 mLs by mouth 3 times daily  Leanne Guerra MD   magnesium hydroxide (MILK OF MAGNESIA) 400 MG/5ML suspension Take 30 mLs by mouth daily as needed for Constipation  Leanne Guerra MD   vitamin D (ERGOCALCIFEROL) 1.25 MG (32700 UT) CAPS capsule Take 1 capsule by mouth once a week  Farida Rolle MD   ferrous sulfate (IRON 325) 325 (65 Fe) MG tablet Take 1 tablet by mouth daily (with breakfast)  Leanne Guerra MD   Blood Pressure Monitoring (B-D ASSURE BPM/AUTO ARM CUFF) MISC 1 Device by Does not apply route daily  Ivory Miranda MD   escitalopram (LEXAPRO) 10 MG tablet Take 1 tablet by mouth daily  Ivory Miranda MD   pantoprazole (PROTONIX) 40 MG tablet Take 1 tablet by mouth daily  MIKO Flanagan CNP   promethazine (PHENERGAN) 25 MG tablet Take 1 tablet by mouth every 8 hours as needed for Nausea  MIKO Flanagan CNP   docusate sodium (COLACE) 100 MG capsule Take 1 capsule by mouth 2 times daily as needed for Constipation  MIKO Flnaagan CNP   vitamin D (ERGOCALCIFEROL) 1.25 MG (20894 UT) CAPS capsule Take 1 capsule by mouth once a week for 8 doses  Trang Marcelo DO   Multiple Vitamins-Minerals (CELEBRATE MULTI-COMPLETE 36) CHEW Take 1 tablet by mouth 2 times daily  Ivory Miranda MD   calcium carbonate (TUMS) 500 MG chewable tablet Take 1 tablet by mouth 2 times daily  Ivory Miranda MD   hydroCHLOROthiazide (HYDRODIURIL) 12.5 MG tablet Take 1 tablet by mouth daily  Ivory Miranda MD       Social History     Tobacco Use    Smoking status: Passive Smoke Exposure - Never Smoker    Smokeless tobacco: Never Used   Substance Use Topics    Alcohol use: Yes     Comment: social     Drug use: Not Currently     Comment: drug use history as a teen        Allergies   Allergen Reactions    Latex Hives and Itching   ,   Past Medical History:   Diagnosis Date    Gastric reflux     History of anxiety     history of panic attacks    Hypertension     Migraine     Neuropathy     Obesities, morbid ([facility] 8/31/2018    TINO (obstructive sleep apnea) 12/17/2018    USES CPAP    Suicide attempt ([facility]     Wears glasses    ,   Past Surgical History:   Procedure Laterality Date    ABDOMEN SURGERY      CHOLECYSTECTOMY, LAPAROSCOPIC  10/07/2019    Laparoscopic cholecystectomy    CHOLECYSTECTOMY, LAPAROSCOPIC N/A 10/7/2019    XI LAPAROSCOPIC ROBOTIC CHOLECYSTECTOMY performed by Trang Marcelo DO at [address] [facility]  04/01/2019 ROBOTIC LAPAROSCOPIC GASTRIC BYPASS ZAID-EN-Y, LIVER BIOPSY, EGD     ZAID-EN-Y GASTRIC BYPASS N/A 4/1/2019    XI ROBOTIC LAPAROSCOPIC GASTRIC BYPASS ZAID-EN-Y, LIVER BIOPSY, EGD performed by Janine Clark DO at [facility] ENDOSCOPY N/A 12/7/2018    EGD ESOPHAGOGASTRODUODENOSCOPY performed by Janine Clark DO at [facility]    UPPER GASTROINTESTINAL ENDOSCOPY N/A 6/14/2019    egd  performed by Janine Clark DO at [facility] Endoscopy    WISDOM TOOTH EXTRACTION     ,   Social History     Tobacco Use    Smoking status: Passive Smoke Exposure - Never Smoker    Smokeless tobacco: Never Used   Substance Use Topics    Alcohol use: Yes     Comment: social     Drug use: Not Currently     Comment: drug use history as a teen   ,   Family History   Problem Relation Age of Onset    Thyroid Disease Mother     Diabetes Mother     Heart Disease Mother     High Blood Pressure Mother     Migraines Mother     Diabetes Father     Heart Disease Father     Thyroid Disease Father     Liver Disease Father     High Blood Pressure Father     Cancer Father         liver    Alcohol Abuse Father     Depression Father     Thyroid Disease Sister     Neuropathy Sister     High Blood Pressure Sister     Kidney Disease Paternal Aunt     Heart Disease Paternal Uncle     Migraines Maternal Grandmother     Stroke Maternal Grandfather     Cancer Maternal Grandfather         breast    Other Paternal Grandmother         epilepsy    Diabetes Paternal Grandmother     High Blood Pressure Paternal Grandmother     Diabetes Paternal Grandfather     High Blood Pressure Paternal Grandfather     Heart Disease Paternal Grandfather    ,   Immunization History   Administered Date(s) Administered    DTaP vaccine 05/04/2020    Influenza, Quadv, IM, (6 mo and older Fluzone, Flulaval, Fluarix and 3 yrs and older Afluria) 09/18/2019    Influenza, Quadv, IM, PF (6 mo and older Fluzone, Flulaval, Fluarix, and 3 yrs and older Afluria) 09/19/2018    Tdap (Boostrix, Adacel) 06/30/2013, 05/04/2020   ,   Health Maintenance   Topic Date Due    Varicella vaccine (1 of 2 - 2-dose childhood series) 12/12/1982    Flu vaccine (1) 09/01/2020    Potassium monitoring  09/11/2021    Creatinine monitoring  09/11/2021    Cervical cancer screen  11/13/2023    DTaP/Tdap/Td vaccine (3 - Td) 05/04/2030    HIV screen  Completed    Hepatitis A vaccine  Aged Out    Hepatitis B vaccine  Aged Out    Hib vaccine  Aged Out    Meningococcal (ACWY) vaccine  Aged Out    Pneumococcal 0-64 years Vaccine  Aged Out       PHYSICAL EXAMINATION:  [ INSTRUCTIONS:  \"[x]\" Indicates a positive item  \"[]\" Indicates a negative item  -- DELETE ALL ITEMS NOT EXAMINED]  Vital Signs: (As obtained by patient/caregiver or practitioner observation) - Unable to obtain as patient does not have a BP cuff. Constitutional: [x] Appears well-developed and well-nourished [x] No apparent distress      Mental status  [x] Alert and awake  [x] Oriented to person/place/time [x]Able to follow commands      Eyes:  EOM    [x]  Normal  Sclera  [x]  Normal          Discharge [x]  None visible      HENT:   [x] Normocephalic, atraumatic. External Ears [x] Normal     Neck: [x] No visualized mass     Pulmonary/Chest: [x] Respiratory effort normal.  [x] No visualized signs of difficulty breathing or respiratory distress          Musculoskeletal:   [x] Normal range of motion of neck            Neurological:        [x] No Facial Asymmetry (Cranial nerve 7 motor function) (limited exam to video visit)          [x] No gaze palsy            Skin:        [x] No significant exanthematous lesions or discoloration noted on facial skin                 Psychiatric:       [x] Normal Affect           Other pertinent observable physical exam findings-  Appears mildly anxious but states not out of her ordinary. ASSESSMENT/PLAN:  1.  Generalized anxiety disorder with panic attacks  - Stop Lexapro due to side effects.   - Continue to follow with Psychology. - Will evaluate in person for possible Wellbutrin use. 2. Near syncope  - Needs to be evaluated in clinic.   - EKG 8/2020 NSR.   - Only cardiac history positive for HTN. - Admits to palpitations prior to episodes however difficult to assess as patient has a history of SHASHI with Palpitations. - Stop Lexapro  - Schedule appointment inpatient for Vitals including orthostatic vitals, EKG, Cardiac examination.   - TSH 1.11 on 9/11/2020   - Electrolytes within normal limits 9/11/2020.   - Hemoglobin 14.4 9/11/2020. No follow-ups on file. Blas Her is a 46 y.o. female being evaluated by a Virtual Visit (video visit) encounter to address concerns as mentioned above. A caregiver was present when appropriate. Due to this being a TeleHealth encounter (During JNQFD-22 public health emergency), evaluation of the following organ systems was limited: Vitals/Constitutional/EENT/Resp/CV/GI//MS/Neuro/Skin/Heme-Lymph-Imm. Pursuant to the emergency declaration under the [address] [place] [address], [address] [place] [address] authority and the [employer] Act, this Virtual Visit was conducted with patient's (and/or legal guardian's) consent, to reduce the patient's risk of exposure to COVID-19 and provide necessary medical care. The patient (and/or legal guardian) has also been advised to contact this office for worsening conditions or problems, and seek emergency medical treatment and/or call 911 if deemed necessary. Patient identification was verified at the start of the visit: Yes    Total time spent on this encounter: 30    Services were provided through a video synchronous discussion virtually to substitute for in-person clinic visit. Patient and provider were located at their individual homes.     --Millicent Pereira DO on 10/28/2020 at 1:24 PM    An electronic signature was used to authenticate this note.

## 2020-10-29 NOTE — TELEPHONE ENCOUNTER
Multiple PA have been placed for the linzess with no reesponse, PA completed via cover my meds again.  Patient notified

## 2020-10-30 ENCOUNTER — TELEPHONE (OUTPATIENT)
Dept: INTERNAL MEDICINE | Age: 40
End: 2020-10-30

## 2020-11-02 NOTE — TELEPHONE ENCOUNTER
Last EKG I see that was completed was on 8/12/2020 under cardiology tab. There is an EKG scanned into that order.  THere is nothing in care everywhere so I assumed that was the EKG results she was calling in regards to

## 2020-11-02 NOTE — TELEPHONE ENCOUNTER
The EKG done on 08/12/2020, was already discussed with her on the last 2 appointments , that EKG was normal.   It looks like Dr Tabatha Evans was intended to order another EKG in the last visit for patient`s palpitations. I am assuming that the patient is referring to that one but I dont have a result.  Please discuss it with the patient and let me know , if I need to put another order for EKG

## 2020-11-02 NOTE — TELEPHONE ENCOUNTER
PC to pt-- she states that when she did her OV with Dr Ibrahima Meneses to discuss switching to Wellbutrin from lexapro he suggested having her come into office for EKG. Or machine is down still.  Can you please order for EKG at hospital???

## 2020-11-09 RX ORDER — PROMETHAZINE HYDROCHLORIDE 25 MG/1
25 TABLET ORAL EVERY 8 HOURS PRN
Qty: 30 TABLET | Refills: 2 | OUTPATIENT
Start: 2020-11-09

## 2020-11-13 RX ORDER — LACTULOSE 10 G/15ML
10 SOLUTION ORAL 3 TIMES DAILY
Qty: 1 BOTTLE | Refills: 1 | Status: SHIPPED | OUTPATIENT
Start: 2020-11-13 | End: 2020-11-19

## 2020-11-13 NOTE — TELEPHONE ENCOUNTER
Pt requesting medication refill,  Next Visit Date: 11/19/20  Future Appointments   Date Time Provider Tl Reaves   11/19/2020 10:40 AM Vitaliy Ro MD [facility] IM [facility]   11/20/2020  9:00 AM MIKO Leos - NP [facility] [address]   11/23/2020  9:00 AM MIKO Novoa - CNP Weight Mgmt [facility]   1/6/2021  1:15 PM Marijane Canavan, DO [facility] OB/Gyn [address] Maintenance   Topic Date Due    Varicella vaccine (1 of 2 - 2-dose childhood series) 12/12/1982    Flu vaccine (1) 09/01/2020    Potassium monitoring  09/11/2021    Creatinine monitoring  09/11/2021    Cervical cancer screen  11/13/2023    DTaP/Tdap/Td vaccine (3 - Td) 05/04/2030    HIV screen  Completed    Hepatitis A vaccine  Aged Out    Hepatitis B vaccine  Aged Out    Hib vaccine  Aged Out    Meningococcal (ACWY) vaccine  Aged Out    Pneumococcal 0-64 years Vaccine  Aged Out       Hemoglobin A1C (%)   Date Value   09/11/2020 4.8   10/24/2018 5.2   08/31/2018 5.3             ( goal A1C is < 7)   No results found for: LABMICR  LDL Cholesterol (mg/dL)   Date Value   09/11/2020 55   09/25/2018 75       (goal LDL is <100)   AST (U/L)   Date Value   09/11/2020 17     ALT (U/L)   Date Value   09/11/2020 20     BUN (mg/dL)   Date Value   09/11/2020 10     BP Readings from Last 3 Encounters:   09/24/20 132/88   08/12/20 130/84   05/28/20 116/70          (goal 120/80)    All Future Testing planned in [facility]  Lab Frequency Next Occurrence   Comprehensive Metabolic Panel Once 70/69/9169   CBC Once 11/30/2020   Iron and TIBC Once 11/30/2020   EKG 12 lead Once 02/09/2021               Patient Active Problem List:     Chronic migraine     Neuropathy     Vitamin D deficiency     Antral gastritis     TINO (obstructive sleep apnea)     Status post gastric bypass for obesity     Restless leg syndrome     Folate deficiency     Low zinc level     Gastroesophageal reflux disease without esophagitis     Obesity (BMI 30-39. 9)     Bipolar II disorder, most recent episode depressed with rapid cycling ([facility] [address]     Near syncope

## 2020-11-19 ENCOUNTER — OFFICE VISIT (OUTPATIENT)
Dept: INTERNAL MEDICINE | Age: 40
End: 2020-11-19
Payer: MEDICARE

## 2020-11-19 VITALS
HEART RATE: 61 BPM | WEIGHT: 161 LBS | TEMPERATURE: 97.7 F | BODY MASS INDEX: 34.73 KG/M2 | SYSTOLIC BLOOD PRESSURE: 129 MMHG | DIASTOLIC BLOOD PRESSURE: 87 MMHG | HEIGHT: 57 IN

## 2020-11-19 PROCEDURE — G8417 CALC BMI ABV UP PARAM F/U: HCPCS | Performed by: INTERNAL MEDICINE

## 2020-11-19 PROCEDURE — 1036F TOBACCO NON-USER: CPT | Performed by: INTERNAL MEDICINE

## 2020-11-19 PROCEDURE — 99211 OFF/OP EST MAY X REQ PHY/QHP: CPT | Performed by: INTERNAL MEDICINE

## 2020-11-19 PROCEDURE — G8484 FLU IMMUNIZE NO ADMIN: HCPCS | Performed by: INTERNAL MEDICINE

## 2020-11-19 PROCEDURE — 99214 OFFICE O/P EST MOD 30 MIN: CPT | Performed by: INTERNAL MEDICINE

## 2020-11-19 PROCEDURE — G8427 DOCREV CUR MEDS BY ELIG CLIN: HCPCS | Performed by: INTERNAL MEDICINE

## 2020-11-19 RX ORDER — HYDROXYZINE HYDROCHLORIDE 25 MG/1
25 TABLET, FILM COATED ORAL EVERY 8 HOURS PRN
Qty: 90 TABLET | Refills: 3 | Status: SHIPPED | OUTPATIENT
Start: 2020-11-19 | End: 2020-12-19

## 2020-11-19 RX ORDER — NYSTATIN 100000 [USP'U]/G
POWDER TOPICAL
Qty: 60 G | Refills: 11 | Status: SHIPPED | OUTPATIENT
Start: 2020-11-19 | End: 2020-11-23 | Stop reason: ALTCHOICE

## 2020-11-19 ASSESSMENT — ENCOUNTER SYMPTOMS
ABDOMINAL PAIN: 1
RESPIRATORY NEGATIVE: 1

## 2020-11-19 NOTE — PATIENT INSTRUCTIONS
Medications e-scribed to pharmacy of patient's choice.     Referral to to dr trinidad to allergy specialist [phone]      tv

## 2020-11-19 NOTE — PROGRESS NOTES
 History of anxiety     history of panic attacks    Hypertension     Migraine     Neuropathy     Obesities, morbid ([facility] 8/31/2018    TINO (obstructive sleep apnea) 12/17/2018    USES CPAP    Suicide attempt ([facility]     Wears glasses        Past Surgical History:   Procedure Laterality Date    ABDOMEN SURGERY      CHOLECYSTECTOMY, LAPAROSCOPIC  10/07/2019    Laparoscopic cholecystectomy    CHOLECYSTECTOMY, LAPAROSCOPIC N/A 10/7/2019    XI LAPAROSCOPIC ROBOTIC CHOLECYSTECTOMY performed by Samir Quinn DO at [address] [facility]  04/01/2019    ROBOTIC LAPAROSCOPIC GASTRIC BYPASS ZAID-EN-Y, LIVER BIOPSY, EGD     ZAID-EN-Y GASTRIC BYPASS N/A 4/1/2019    XI ROBOTIC LAPAROSCOPIC GASTRIC BYPASS ZAID-EN-Y, LIVER BIOPSY, EGD performed by Samir Quinn DO at [place] [address] ENDOSCOPY N/A 12/7/2018    EGD ESOPHAGOGASTRODUODENOSCOPY performed by Samir Quinn DO at [facility]    UPPER GASTROINTESTINAL ENDOSCOPY N/A 6/14/2019    egd  performed by Samir Quinn DO at [facility] Endoscopy    WISDOM TOOTH EXTRACTION         Family History   Problem Relation Age of Onset    Thyroid Disease Mother     Diabetes Mother     Heart Disease Mother     High Blood Pressure Mother     Migraines Mother     Diabetes Father     Heart Disease Father     Thyroid Disease Father     Liver Disease Father     High Blood Pressure Father     Cancer Father         liver    Alcohol Abuse Father     Depression Father     Thyroid Disease Sister     Neuropathy Sister     High Blood Pressure Sister     Kidney Disease Paternal Aunt     Heart Disease Paternal Uncle     Migraines Maternal Grandmother     Stroke Maternal Grandfather     Cancer Maternal Grandfather         breast    Other Paternal Grandmother         epilepsy    Diabetes Paternal Grandmother     High Blood Pressure Paternal Grandmother     Diabetes Paternal [place] High Blood Pressure Paternal Grandfather     Heart Disease Paternal Grandfather        Social History     Socioeconomic History    Marital status:      Spouse name: None    Number of children: None    Years of education: None    Highest education level: None   Occupational History    None   Social Needs    Financial resource strain: None    Food insecurity     Worry: None     Inability: None    Transportation needs     Medical: None     Non-medical: None   Tobacco Use    Smoking status: Passive Smoke Exposure - Never Smoker    Smokeless tobacco: Never Used   Substance and Sexual Activity    Alcohol use: Yes     Comment: social     Drug use: Not Currently     Comment: drug use history as a teen    Sexual activity: Yes   Lifestyle    Physical activity     Days per week: None     Minutes per session: None    Stress: None   Relationships    Social connections     Talks on phone: None     Gets together: None     Attends Muslim service: None     Active member of club or organization: None     Attends meetings of clubs or organizations: None     Relationship status: None    Intimate partner violence     Fear of current or ex partner: None     Emotionally abused: None     Physically abused: None     Forced sexual activity: None   Other Topics Concern    None   Social History Narrative    None       Current Outpatient Medications   Medication Sig Dispense Refill    hydrOXYzine (ATARAX) 25 MG tablet Take 1 tablet by mouth every 8 hours as needed for Itching 90 tablet 3    nystatin (MYCOSTATIN) 160155 UNIT/GM powder Apply 3 times daily.  60 g 11    magnesium hydroxide (MILK OF MAGNESIA) 400 MG/5ML suspension Take 30 mLs by mouth daily as needed for Constipation 1 Bottle 3    pantoprazole (PROTONIX) 40 MG tablet Take 1 tablet by mouth daily 60 tablet 3    promethazine (PHENERGAN) 25 MG tablet Take 1 tablet by mouth every 8 hours as needed for Nausea 30 tablet 2    docusate sodium (COLACE) 100 MG capsule Take 1 capsule by mouth 2 times daily as needed for Constipation 60 capsule 3    Multiple Vitamins-Minerals (CELEBRATE MULTI-COMPLETE 36) CHEW Take 1 tablet by mouth 2 times daily 60 tablet 5    calcium carbonate (TUMS) 500 MG chewable tablet Take 1 tablet by mouth 2 times daily 60 tablet 5    hydroCHLOROthiazide (HYDRODIURIL) 12.5 MG tablet Take 1 tablet by mouth daily 30 tablet 5     No current facility-administered medications for this visit. Allergies   Allergen Reactions    Latex Hives and Itching       PHYSICAL EXAM:   Vital Signs:   /87 (Site: Left Upper Arm, Position: Sitting, Cuff Size: Large Adult)   Pulse 61   Temp 97.7 °F (36.5 °C)   Ht 4' 9\" (1.448 m)   Wt 161 lb (73 kg)   BMI 34.84 kg/m²     BP Readings from Last 3 Encounters:   11/19/20 129/87   09/24/20 132/88   08/12/20 130/84        Wt Readings from Last 3 Encounters:   11/19/20 161 lb (73 kg)   09/24/20 162 lb (73.5 kg)   08/12/20 164 lb (74.4 kg)       Physical Exam  Constitutional:       Appearance: She is obese. Neck:      Musculoskeletal: Normal range of motion. Cardiovascular:      Rate and Rhythm: Normal rate and regular rhythm. Pulmonary:      Effort: Pulmonary effort is normal.   Abdominal:      General: Abdomen is flat. Skin:     General: Skin is warm. Neurological:      General: No focal deficit present. Mental Status: She is alert and oriented to person, place, and time. Mental status is at baseline. Psychiatric:         Mood and Affect: Mood normal.         Body mass index is 34.84 kg/m².     RESULTS    Lab Findings    CBC:   Lab Results   Component Value Date    WBC 4.8 09/11/2020    HGB 14.4 09/11/2020     09/11/2020     BMP:   Lab Results   Component Value Date     09/11/2020    K 4.7 09/11/2020     09/11/2020    CO2 24 09/11/2020    BUN 10 09/11/2020    CREATININE 0.55 09/11/2020    GLUCOSE 83 09/11/2020     HEMOGLOBIN A1C:   Lab Results   Component Value Date LABA1C 4.8 09/11/2020     MICROALBUMIN URINE: No results found for: MICROALBUR  FASTING LIPID PANEL:   Lab Results   Component Value Date    CHOL 120 09/11/2020    HDL 42 09/11/2020    TRIG 114 09/11/2020     Lab Results   Component Value Date    LDLCHOLESTEROL 55 09/11/2020     LIVER PROFILE:   Lab Results   Component Value Date    ALT 20 09/11/2020    AST 17 09/11/2020    PROT 6.4 09/11/2020    BILITOT 0.54 09/11/2020    BILIDIR 0.22 06/13/2019    LABALBU 3.9 09/11/2020      THYROID FUNCTION:   Lab Results   Component Value Date    TSH 1.11 09/11/2020      URINE ANALYSIS: No results found for: [address]    1. Generalized anxiety disorder with panic attacks    - hydrOXYzine (ATARAX) 25 MG tablet; Take 1 tablet by mouth every 8 hours as needed for Itching  Dispense: 90 tablet; Refill: 3    2. Generalized pruritus    - hydrOXYzine (ATARAX) 25 MG tablet; Take 1 tablet by mouth every 8 hours as needed for Itching  Dispense: 90 tablet; Refill: 3  - Natalie Roach MD, Allergy & Immunology, [place]    3. Obesity (BMI 30-39.9)      4. Candidiasis of perineum  - nystatin (MYCOSTATIN) 404323 UNIT/GM powder; Apply 3 times daily. Dispense: 60 g; Refill: 11        plan    Atarax for pruritis and SHASHI, psych referral , scheduled for tomorrow    She is a poor candidate for addipex hence will not prescribe it on account of medical history   She has a dietician referral in place from bariatric surgeons    Follow up Instructions:    1. No follow-ups on file. 2. Reviewed prior labs and health maintenance. 3. Discussed use, benefit, and side effects of prescribed medications. Barriers to medication compliance addressed. All patient questions answered. Pt voiced understanding.      4. Patient given educational materials - see patient instructions      MD DONAL Eckert  Attending Physician, [address] [place] [address], Internal Medicine Residency Program  [facility]) Physician - [place]  11/19/2020, 11:40 AM

## 2020-11-20 ENCOUNTER — TELEMEDICINE (OUTPATIENT)
Dept: PSYCHIATRY | Age: 40
End: 2020-11-20
Payer: MEDICARE

## 2020-11-20 PROBLEM — F33.9 MAJOR DEPRESSIVE DISORDER, RECURRENT EPISODE WITH ANXIOUS DISTRESS (HCC): Status: ACTIVE | Noted: 2020-11-20

## 2020-11-20 PROBLEM — F43.10 PTSD (POST-TRAUMATIC STRESS DISORDER): Status: ACTIVE | Noted: 2020-11-20

## 2020-11-20 PROCEDURE — 90792 PSYCH DIAG EVAL W/MED SRVCS: CPT | Performed by: NURSE PRACTITIONER

## 2020-11-20 RX ORDER — TRAZODONE HYDROCHLORIDE 50 MG/1
25 TABLET ORAL NIGHTLY PRN
Qty: 15 TABLET | Refills: 0 | Status: SHIPPED | OUTPATIENT
Start: 2020-11-20

## 2020-11-20 RX ORDER — PAROXETINE 10 MG/1
TABLET, FILM COATED ORAL
Qty: 27 TABLET | Refills: 0 | Status: SHIPPED | OUTPATIENT
Start: 2020-11-20 | End: 2021-01-27

## 2020-11-20 RX ORDER — PRAZOSIN HYDROCHLORIDE 1 MG/1
1 CAPSULE ORAL NIGHTLY
Qty: 30 CAPSULE | Refills: 0 | Status: SHIPPED | OUTPATIENT
Start: 2020-11-20 | End: 2021-03-24

## 2020-11-20 NOTE — PROGRESS NOTES
Behavioral Health Consultation  Naresh Elliott MSN, APRN-CNP, PMHNP-BC  11/20/2020, 9:39 AM      Time spent with Patient:  60 minutes  This was a telehealth visit. Patient Location: Home. Provider Location: Home office in [place], [place]  This virtual visit was conducted via interactive/real-time audio/video      Chief Complaint:mental health problem. Referring Provider:Dr. Tracey Mohamud. [place]:  Patient complains of a multi-year history of \"mood swings\". She reports anhedonia, depressed mood, tearfulness, feelings of hopelessness, feelings of worthlessness/excessive guilt, insomnia, fatigue, difficulty concentrating, irritability, excessive worry, panic attacks, increased use of drugs or alcohol, suicidal thoughts or behavior and impaired memory. She reports difficulty falling asleep, difficulty staying asleep, restless unsatisfying sleep and early morning waking on most nights of the week. Pt reports taking nothing. Pt Symptoms/signs of yoav: none. She denies hallucinations. Symptoms have been gradually worsening with time. External stressors: illness or family illness. Pt reports excessive worry or anxiety, difficulty controlling the worry, restlessness; feeling keyed up or on edge, easily fatigued, difficulty concentrating, irritability, muscle tension, sleep disturbance:  difficulty falling asleep, difficulty staying asleep, restless unsatisfying sleep and early morning waking, for at least 6 months, anxiety/worry about a number of events/activities, causing significant distress in important areas of function, is not due to physiological effects of substances or medical condition and does not occur exclusively during a mood disorder or psychotic disorder.  Patient exposed to traumatic event - actual or threatened death, serious injury, or sexual violence, intrusive symotoms:  images, thoughts, or perceptions of the event, dreams of the event, intense psychological distress when reminded of the event and intense physiological reactivity when reminded of the event, avoidance symptoms: avoids thoughts, feelings, conversations associated with the trauma and avoids external reminders :activities, people, places that arouse recollections of the trauma, negative alterations in cognition and mood:  persistent negative beliefs about oneself, others, or the world, negative emotional state, exhibits markedly diminished interest/participation in significant activities and feels detached/estranged from others, arousal symptoms : irritability or outbursts of anger, self-destructive behavior, poor concentration, hyper-vigilance, exaggerated startle resopnse and difficulty falling or staying asleep, duration of disturbances is greater than one month and significant distress/impairment in functioning. Pt reports she walks to work. McRae Helena Both states that she is eating around one meal a day, and makes healthy choices when she eats. Social:, for 19 years. Has three kids. Working at [employer], for last ,month. GED. CNA licensse. Past Psychiatric history:   The patient has a history of  depression, bipolar disorder, eating disorder- anorexia and attempted suicide. Previous treatment has included: Zoloft- felt sluggish, Lexapro- felt this caused some heart issues, Wellbutrin- felt too \"\" on this one, mood stabilizer- seroquel- stopped because of leg cramps, stimulant- ritalin- when she was younger, individual therapy- started in [facility], then just started with it up here and group therapy- in [place]. .    Family Mental Health history:   Pertinent family history: depression and bipolar disorder. Paternal Aunt and sister have bipolar. Mother is also bipolar. MSE:    Appearance: alert, cooperative  Attention:Intact  Appetite: normal  Ambulation: gait unable to assess due to telehealth.   Sleep disturbance: Yes  Loss of pleasure: Yes  Speech: spontaneous, normal rate, normal volume and well articulated  Mood: Depressed  Affect: depressed affect  Thought Content: intact, hopelessness, helplessness and all or nothing thinking  Insight: Poor  Judgment: Intact  Memory: Intact long-term and Intact short-term  Suicide Assessment: no suicidal ideation  Homicide Assessment: denies current homicidal ideation, plan and intent      History:      Review of Systems:   Constitutional: negative  HENT: negative  Eyes: positive for eye glasses, optic nerve issues. Respiratory: negative  Cardiovascular: negative  Gastrointestinal: positive for gastric bypass, GERD, IBS. Genitourinary: positive for irregular periods. Musculoskeletal: positive for hx of broken ankle  Skin:positive for hives. And rashes. Neurological:positive for migrainese. Endo/Heme/Allergies:positive for latex allergy. Current Outpatient Medications:     PARoxetine (PAXIL) 10 MG tablet, Take 0.5 tablets by mouth every morning for 7 days, THEN 1 tablet every morning for 23 days. , Disp: 27 tablet, Rfl: 0    traZODone (DESYREL) 50 MG tablet, Take 0.5 tablets by mouth nightly as needed for Sleep, Disp: 15 tablet, Rfl: 0    prazosin (MINIPRESS) 1 MG capsule, Take 1 capsule by mouth nightly, Disp: 30 capsule, Rfl: 0    hydrOXYzine (ATARAX) 25 MG tablet, Take 1 tablet by mouth every 8 hours as needed for Itching, Disp: 90 tablet, Rfl: 3    nystatin (MYCOSTATIN) 600684 UNIT/GM powder, Apply 3 times daily. , Disp: 60 g, Rfl: 11    magnesium hydroxide (MILK OF MAGNESIA) 400 MG/5ML suspension, Take 30 mLs by mouth daily as needed for Constipation, Disp: 1 Bottle, Rfl: 3    pantoprazole (PROTONIX) 40 MG tablet, Take 1 tablet by mouth daily, Disp: 60 tablet, Rfl: 3    promethazine (PHENERGAN) 25 MG tablet, Take 1 tablet by mouth every 8 hours as needed for Nausea, Disp: 30 tablet, Rfl: 2    docusate sodium (COLACE) 100 MG capsule, Take 1 capsule by mouth 2 times daily as needed for Constipation, Disp: 60 capsule, Rfl: 3    Multiple Vitamins-Minerals (CELEBRATE MULTI-COMPLETE 36) CHEW, Take 1 tablet by mouth 2 times daily, Disp: 60 tablet, Rfl: 5    calcium carbonate (TUMS) 500 MG chewable tablet, Take 1 tablet by mouth 2 times daily, Disp: 60 tablet, Rfl: 5    hydroCHLOROthiazide (HYDRODIURIL) 12.5 MG tablet, Take 1 tablet by mouth daily, Disp: 30 tablet, Rfl: 5     PDMP Monitoring:    Last PDMP [place] as Reviewed [facility]):  Review User Review Instant Review Result   Alyssa Gonzales 11/20/2020  9:28 AM Reviewed PDMP [1]     Last Controlled Substance Monitoring Documentation      Office Visit from 6/21/2019 in [facility]   Periodic Controlled Substance Monitoring  No signs of potential drug abuse or diversion identified. , Possible medication side effects, risk of tolerance/dependence & alternative treatments discussed. filed at 06/21/2019 1607        Urine Drug Screenings (1 yr)     Urine Drug Screen  Collected: 7/6/2016  9:41 PM (Final result)    Complete Results              Medication Contract and Consent for Opioid Use Documents Filed      No documents found                 OARRS checked and there were no concerns of substance abuse, or prescription misuse.          Social History     Socioeconomic History    Marital status:      Spouse name: Not on file    Number of children: Not on file    Years of education: Not on file    Highest education level: Not on file   Occupational History    Not on file   Social Needs    Financial resource strain: Not on file    Food insecurity     Worry: Not on file     Inability: Not on file    Transportation needs     Medical: Not on file     Non-medical: Not on file   Tobacco Use    Smoking status: Passive Smoke Exposure - Never Smoker    Smokeless tobacco: Never Used   Substance and Sexual Activity    Alcohol use: Yes     Comment: social     Drug use: Not Currently     Comment: drug use history as a teen    Sexual activity: Yes   Lifestyle    Physical activity     Days per week: Not on file     Minutes per session: Not on file    Stress: Not on file   Relationships    Social connections     Talks on phone: Not on file     Gets together: Not on file     Attends Worship service: Not on file     Active member of club or organization: Not on file     Attends meetings of clubs or organizations: Not on file     Relationship status: Not on file    Intimate partner violence     Fear of current or ex partner: Not on file     Emotionally abused: Not on file     Physically abused: Not on file     Forced sexual activity: Not on file   Other Topics Concern    Not on file   Social History Narrative    Not on file       TOBACCO: Carlos Show  reports that she is a non-smoker but has been exposed to tobacco smoke. She has never used smokeless tobacco.  ETOH: Teller Show  reports current alcohol use. Past Medical History:   Diagnosis Date    Gastric reflux     History of anxiety     history of panic attacks    Hypertension     Migraine     Neuropathy     Obesities, morbid ([address] 8/31/2018    TINO (obstructive sleep apnea) 12/17/2018    USES CPAP    Suicide attempt ([address]     Wears glasses       Metabolic monitoring is being done by PCP.    Family History   Problem Relation Age of Onset    Thyroid Disease Mother     Diabetes Mother     Heart Disease Mother     High Blood Pressure Mother    Jonas Migraines Mother     Diabetes Father     Heart Disease Father     Thyroid Disease Father     Liver Disease Father     High Blood Pressure Father     Cancer Father         liver    Alcohol Abuse Father     Depression Father     Thyroid Disease Sister     Neuropathy Sister     High Blood Pressure Sister     Kidney Disease Paternal Aunt     Heart Disease Paternal Uncle     Migraines Maternal Grandmother     Stroke Maternal Grandfather     Cancer Maternal Grandfather         breast    Other Paternal Grandmother         epilepsy    Diabetes Paternal Grandmother     High Blood Pressure Paternal Grandmother  Diabetes Paternal Grandfather     High Blood Pressure Paternal Grandfather     Heart Disease Paternal Grandfather        Last Labs:   Lab Results   Component Value Date    LABA1C 4.8 09/11/2020     Lab Results   Component Value Date    EAG 91 09/11/2020      Lab Results   Component Value Date    WBC 4.8 09/11/2020    HGB 14.4 09/11/2020    HCT 44.2 09/11/2020    MCV 96.3 09/11/2020     09/11/2020    LYMPHOPCT 36 09/11/2020    RBC 4.59 09/11/2020    MCH 31.4 09/11/2020    MCHC 32.6 09/11/2020    RDW 13.0 09/11/2020          Lab Results   Component Value Date     09/11/2020    K 4.7 09/11/2020     09/11/2020    CO2 24 09/11/2020    BUN 10 09/11/2020    CREATININE 0.55 09/11/2020    GLUCOSE 83 09/11/2020    CALCIUM 8.4 (L) 09/11/2020    PROT 6.4 09/11/2020    LABALBU 3.9 09/11/2020    BILITOT 0.54 09/11/2020    ALKPHOS 85 09/11/2020    AST 17 09/11/2020    ALT 20 09/11/2020    LABGLOM >60 09/11/2020    GFRAA >60 09/11/2020    GLOB NOT REPORTED 06/13/2019      . last      Diagnosis:      1. Major depressive disorder, recurrent episode with anxious distress ([facility] [address]    2. PTSD (post-traumatic stress disorder)        Plan:    Discussed starting paroxetine, and trazodone. Discussed risks and benefits of medications, as well as need for yearly lab work. Follow up with Denice Alexandre for continued therapy. 60 minutes spent face to face with greater than 50% was spent coordinating care, and therapy. No orders of the defined types were placed in this encounter. Orders Placed This Encounter   Medications    PARoxetine (PAXIL) 10 MG tablet     Sig: Take 0.5 tablets by mouth every morning for 7 days, THEN 1 tablet every morning for 23 days.      Dispense:  27 tablet     Refill:  0    traZODone (DESYREL) 50 MG tablet     Sig: Take 0.5 tablets by mouth nightly as needed for Sleep     Dispense:  15 tablet     Refill:  0    prazosin (MINIPRESS) 1 MG capsule     Sig: Take 1 capsule by mouth nightly Dispense:  30 capsule     Refill:  0       Pt interventions:    Discussed importance of medication adherence, Discussed risks, benefits, side effects of medication and need for follow up treatment, Discussed benefits of referral for specialty care and Discussed self-care (sleep, nutrition, rewarding activities, social support, exercise)

## 2020-11-23 ENCOUNTER — TELEMEDICINE (OUTPATIENT)
Dept: PULMONOLOGY | Age: 40
End: 2020-11-23
Payer: MEDICARE

## 2020-11-23 ENCOUNTER — OFFICE VISIT (OUTPATIENT)
Dept: BARIATRICS/WEIGHT MGMT | Age: 40
End: 2020-11-23
Payer: MEDICARE

## 2020-11-23 VITALS
BODY MASS INDEX: 36.03 KG/M2 | WEIGHT: 167 LBS | HEART RATE: 80 BPM | RESPIRATION RATE: 20 BRPM | SYSTOLIC BLOOD PRESSURE: 122 MMHG | DIASTOLIC BLOOD PRESSURE: 76 MMHG | HEIGHT: 57 IN | TEMPERATURE: 97.2 F

## 2020-11-23 PROBLEM — L98.7 EXCESS SKIN OF ABDOMEN: Status: ACTIVE | Noted: 2020-11-23

## 2020-11-23 PROCEDURE — 99213 OFFICE O/P EST LOW 20 MIN: CPT | Performed by: NURSE PRACTITIONER

## 2020-11-23 PROCEDURE — G8428 CUR MEDS NOT DOCUMENT: HCPCS | Performed by: NURSE PRACTITIONER

## 2020-11-23 PROCEDURE — 1036F TOBACCO NON-USER: CPT | Performed by: NURSE PRACTITIONER

## 2020-11-23 PROCEDURE — G8417 CALC BMI ABV UP PARAM F/U: HCPCS | Performed by: NURSE PRACTITIONER

## 2020-11-23 PROCEDURE — G8484 FLU IMMUNIZE NO ADMIN: HCPCS | Performed by: NURSE PRACTITIONER

## 2020-11-23 PROCEDURE — G8427 DOCREV CUR MEDS BY ELIG CLIN: HCPCS | Performed by: NURSE PRACTITIONER

## 2020-11-23 ASSESSMENT — ENCOUNTER SYMPTOMS
GASTROINTESTINAL NEGATIVE: 1
ALLERGIC/IMMUNOLOGIC NEGATIVE: 1
EYES NEGATIVE: 1

## 2020-11-23 NOTE — PROGRESS NOTES
tonsillectomy/adenoidectomy. Visit initially completed as a MyChart visit but due to difficulties with audio/visual was transition to a telephone call in order to facilitate communication. Prior work-up:  PFT 3/11/2019: FVC 3.16 (113% of predicted), FEV1 2.75 (113% of predicted), FEV1/FVC ratio 87 (100% of predicted), FEF 25-75 3.5 (118% of predicted). Lung volumes by plethysmography RV 0.99 (83% of predicted), TLC 4.13 (104% of predicted), DSB 18.21 (91% of predicted). Final impression: Normal pulmonary function test.    Sleep study:  CPAP titration study 1/12/2019: Patient was treated with CPAP throughout the night. Patient's respiratory events ultimately resolved with a pressure of 10 cm H2O. At this pressure there was 1 residual hypopneas for an apnea-hypopnea index of 0.6 events per hour sleep and minimum oxygen saturation 95%. She had a home sleep study/apnea link screening completed 10/28/2018: She had an apnea-hypopnea index of 8 events per hour. With an SPO2 lowest recorded desaturation was 85%. Review of Systems   Constitutional:        Daytime fatigue   HENT:        Sore throat/voice hoarseness in the morning/dry itchy cough in the morning. Eyes: Negative. Respiratory:        Denies shortness of breath. Cardiovascular: Negative. Gastrointestinal: Negative. Endocrine: Negative. Genitourinary: Negative. Musculoskeletal: Negative. Skin: Negative. Allergic/Immunologic: Negative. Neurological: Negative. Hematological: Negative. Psychiatric/Behavioral: Negative. Prior to Visit Medications    Medication Sig Taking? Authorizing Provider   PARoxetine (PAXIL) 10 MG tablet Take 0.5 tablets by mouth every morning for 7 days, THEN 1 tablet every morning for 23 days.   MIKO Miller NP   traZODone (DESYREL) 50 MG tablet Take 0.5 tablets by mouth nightly as needed for Sleep  MIKO Miller NP   prazosin (MINIPRESS) 1 MG capsule Take 1 capsule by mouth nightly  MIKO Welch NP   hydrOXYzine (ATARAX) 25 MG tablet Take 1 tablet by mouth every 8 hours as needed for Itching  Farida Rolle MD   pantoprazole (PROTONIX) 40 MG tablet Take 1 tablet by mouth daily  MIKO Lopez CNP   promethazine (PHENERGAN) 25 MG tablet Take 1 tablet by mouth every 8 hours as needed for Nausea  MIKO Lopez CNP   docusate sodium (COLACE) 100 MG capsule Take 1 capsule by mouth 2 times daily as needed for Constipation  Patient not taking: Reported on 11/23/2020  MIKO Lopez CNP   Multiple Vitamins-Minerals (CELEBRATE MULTI-COMPLETE 36) CHEW Take 1 tablet by mouth 2 times daily  Parish Trimble MD   calcium carbonate (TUMS) 500 MG chewable tablet Take 1 tablet by mouth 2 times daily  Parish Trimble MD       Social History     Tobacco Use    Smoking status: Passive Smoke Exposure - Never Smoker    Smokeless tobacco: Never Used   Substance Use Topics    Alcohol use: Yes     Comment: social     Drug use: Not Currently     Comment: drug use history as a teen              RECORD REVIEW: Previous medical records were reviewed at today's visit.     Wt Readings from Last 3 Encounters:   11/23/20 167 lb (75.8 kg)   11/19/20 161 lb (73 kg)   09/24/20 162 lb (73.5 kg)       Results for orders placed or performed during the hospital encounter of 09/11/20   Comprehensive Metabolic Panel   Result Value Ref Range    Glucose 83 70 - 99 mg/dL    BUN 10 6 - 20 mg/dL    CREATININE 0.55 0.50 - 0.90 mg/dL    Bun/Cre Ratio NOT REPORTED 9 - 20    Calcium 8.4 (L) 8.6 - 10.4 mg/dL    Sodium 140 135 - 144 mmol/L    Potassium 4.7 3.7 - 5.3 mmol/L    Chloride 107 98 - 107 mmol/L    CO2 24 20 - 31 mmol/L    Anion Gap 9 9 - 17 mmol/L    Alkaline Phosphatase 85 35 - 104 U/L    ALT 20 5 - 33 U/L    AST 17 <32 U/L    Total Bilirubin 0.54 0.3 - 1.2 mg/dL    Total Protein 6.4 6.4 - 8.3 g/dL    Alb 3.9 3.5 - 5.2 g/dL    Albumin/Globulin Ratio 1.6 1.0 - 2.5    GFR Non-African American >60 >60 mL/min    GFR African American >60 >60 mL/min    GFR Comment          GFR Staging NOT REPORTED    TSH without Reflex   Result Value Ref Range    TSH 1.11 0.30 - 5.00 mIU/L   Hemoglobin A1C   Result Value Ref Range    Hemoglobin A1C 4.8 4.0 - 6.0 %    Estimated Avg Glucose 91 mg/dL   Vitamin B12 & Folate   Result Value Ref Range    Vitamin B-12 388 232 - 1245 pg/mL    Folate 17.0 >4.8 ng/mL   Vitamin B1   Result Value Ref Range    Vitamin B1,Whole Blood 172 70 - 180 nmol/L   Vitamin D 25 Hydroxy   Result Value Ref Range    Vit D, 25-Hydroxy 15.6 (L) 30.0 - 100.0 ng/mL   Magnesium   Result Value Ref Range    Magnesium 2.0 1.6 - 2.6 mg/dL   Iron and TIBC   Result Value Ref Range    Iron 55 37 - 145 ug/dL    TIBC 296 250 - 450 ug/dL    Iron Saturation 19 (L) 20 - 55 %    UIBC 241 112 - 347 ug/dL   Vitamin A   Result Value Ref Range    Vitamin A 0.41 0.30 - 1.20 mg/L    RETINYL PALMITATE <0.02 0.00 - 0.10 mg/L    Vitamin A, Interp Normal    Lipid Panel   Result Value Ref Range    Cholesterol 120 <200 mg/dL    HDL 42 >40 mg/dL    LDL Cholesterol 55 0 - 130 mg/dL    Chol/HDL Ratio 2.9 <5    Triglycerides 114 <150 mg/dL    VLDL NOT REPORTED 1 - 30 mg/dL   PTH, Intact   Result Value Ref Range    Pth Intact 108.7 (H) 15.0 - 65.0 pg/mL   CBC Auto Differential   Result Value Ref Range    WBC 4.8 3.5 - 11.3 k/uL    RBC 4.59 3.95 - 5.11 m/uL    Hemoglobin 14.4 11.9 - 15.1 g/dL    Hematocrit 44.2 36.3 - 47.1 %    MCV 96.3 82.6 - 102.9 fL    MCH 31.4 25.2 - 33.5 pg    MCHC 32.6 28.4 - 34.8 g/dL    RDW 13.0 11.8 - 14.4 %    Platelets 922 753 - 311 k/uL    MPV 11.5 8.1 - 13.5 fL    NRBC Automated 0.0 0.0 per 100 WBC    Differential Type NOT REPORTED     Seg Neutrophils 55 36 - 65 %    Lymphocytes 36 24 - 43 %    Monocytes 5 3 - 12 %    Eosinophils % 3 1 - 4 %    Basophils 1 0 - 2 %    Immature Granulocytes 0 0 %    Segs Absolute 2.63 1.50 - 8.10 k/uL    Absolute Lymph # 1.76 1.10 - 3.70 k/uL    Absolute Mono # 0.24 0.10 - 1.20 k/uL    Absolute Eos # 0.15 0.00 - 0.44 k/uL    Basophils Absolute 0.05 0.00 - 0.20 k/uL    Absolute Immature Granulocyte <0.03 0.00 - 0.30 k/uL    WBC Morphology NOT REPORTED     RBC Morphology NOT REPORTED     Platelet Estimate NOT REPORTED        No results found. PHYSICAL EXAMINATION:  Due to this being a TeleHealth encounter, evaluation of the following organ systems is limited: Vitals/Constitutional/EENT/Resp/CV/GI//MS/Neuro/Skin/Heme-Lymph-Imm. Constitutional: [x] Appears well-developed and well-nourished. [] Abnormal  Mental status  [x] Alert and awake  [x] Oriented to person/place/time [x]Able to follow commands    [x] No apparent distress      Eyes:  EOM    [x]  Normal  [] Abnormal-  Sclera  [x]  Normal  [] Abnormal -         Discharge [x]  None visible  [] Abnormal -    HENT:   [x] Normocephalic, atraumatic. [] Abnormal shaped head   [x] Mouth/Throat: Mucous membranes are moist.     Ears [x] Normal  [] Abnormal-    Neck: [x] Normal range of motion [x] Supple [x] No visualized mass. Pulmonary/Chest: [x] Respiratory effort normal.  [x] No visualized signs of difficulty breathing or respiratory distress        [] Abnormal      Musculoskeletal:   [x] Normal range of motion. [x] Normal gait with no signs of ataxia. [x]  No signs of cyanosis of the peripheral portions of extremities. [] Abnormal       Neurological:        [x] Normal cranial nerve (limited exam to video visit) [x] No focal weakness observed       [] Abnormal          Speech       [x] Normal   [] Abnormal     Skin:        [x] No rash on visible skin  [x] Normal  [] Abnormal     Psychiatric:       [x] Normal  [] Abnormal        [x] Normal Mood  [] Anxious appearing      Other pertinent exam findings:-        ASSESSMENT:  1. TINO (obstructive sleep apnea)    2. Status post gastric bypass for obesity          Plan:   1. Medications reviewed- No pulmonary medications.    2. Maintain an active

## 2020-11-23 NOTE — PROGRESS NOTES
Disease Mother     High Blood Pressure Mother    Ramonita Jacinto Migraines Mother     Diabetes Father     Heart Disease Father     Thyroid Disease Father     Liver Disease Father     High Blood Pressure Father     Cancer Father         liver    Alcohol Abuse Father     Depression Father     Thyroid Disease Sister     Neuropathy Sister     High Blood Pressure Sister     Kidney Disease Paternal Aunt     Heart Disease Paternal Uncle     Migraines Maternal Grandmother     Stroke Maternal Grandfather     Cancer Maternal Grandfather         breast    Other Paternal Grandmother         epilepsy    Diabetes Paternal Grandmother     High Blood Pressure Paternal Grandmother     Diabetes Paternal Grandfather     High Blood Pressure Paternal Grandfather     Heart Disease Paternal Grandfather        Social History:  Social History     Socioeconomic History    Marital status:      Spouse name: Not on file    Number of children: Not on file    Years of education: Not on file    Highest education level: Not on file   Occupational History    Not on file   Social Needs    Financial resource strain: Not on file    Food insecurity     Worry: Not on file     Inability: Not on file   [employer] needs     Medical: Not on file     Non-medical: Not on file   Tobacco Use    Smoking status: Passive Smoke Exposure - Never Smoker    Smokeless tobacco: Never Used   Substance and Sexual Activity    Alcohol use: Yes     Comment: social     Drug use: Not Currently     Comment: drug use history as a teen    Sexual activity: Yes   Lifestyle    Physical activity     Days per week: Not on file     Minutes per session: Not on file    Stress: Not on file   Relationships    Social connections     Talks on phone: Not on file     Gets together: Not on file     Attends Manda[employer] service: Not on file     Active member of club or organization: Not on file     Attends meetings of clubs or organizations: Not on file Relationship status: Not on file    Intimate partner violence     Fear of current or ex partner: Not on file     Emotionally abused: Not on file     Physically abused: Not on file     Forced sexual activity: Not on file   Other Topics Concern    Not on file   Social History Narrative    Not on file       Current Medications:  Current Outpatient Medications   Medication Sig Dispense Refill    PARoxetine (PAXIL) 10 MG tablet Take 0.5 tablets by mouth every morning for 7 days, THEN 1 tablet every morning for 23 days. 27 tablet 0    traZODone (DESYREL) 50 MG tablet Take 0.5 tablets by mouth nightly as needed for Sleep 15 tablet 0    prazosin (MINIPRESS) 1 MG capsule Take 1 capsule by mouth nightly 30 capsule 0    hydrOXYzine (ATARAX) 25 MG tablet Take 1 tablet by mouth every 8 hours as needed for Itching 90 tablet 3    pantoprazole (PROTONIX) 40 MG tablet Take 1 tablet by mouth daily 60 tablet 3    promethazine (PHENERGAN) 25 MG tablet Take 1 tablet by mouth every 8 hours as needed for Nausea 30 tablet 2    Multiple Vitamins-Minerals (CELEBRATE MULTI-COMPLETE 36) CHEW Take 1 tablet by mouth 2 times daily 60 tablet 5    calcium carbonate (TUMS) 500 MG chewable tablet Take 1 tablet by mouth 2 times daily 60 tablet 5    docusate sodium (COLACE) 100 MG capsule Take 1 capsule by mouth 2 times daily as needed for Constipation (Patient not taking: Reported on 11/23/2020) 60 capsule 3     No current facility-administered medications for this visit. Vital Signs:  /76 (Site: Right Upper Arm, Position: Sitting, Cuff Size: Large Adult)   Pulse 80   Temp 97.2 °F (36.2 °C) (Infrared)   Resp 20   Ht 4' 9\" (1.448 m)   Wt 167 lb (75.8 kg)   BMI 36.14 kg/m²     BMI/Height/Weight:  Body mass index is 36.14 kg/m². Review of Systems - A review of systems was performed. All was negative unless otherwise documented in HPI. Constitutional: Negative for fever, chills and diaphoresis.    HENT: Negative for hearing loss and trouble swallowing. Eyes: Negative for photophobia and visual disturbance. Respiratory: Negative for cough, shortness of breath and wheezing. Cardiovascular: Negative for chest pain and palpitations. Gastrointestinal: Negative for nausea, vomiting, abdominal pain, diarrhea, constipation, blood in stool and abdominal distention. Endocrine: Negative for polydipsia, polyphagia and polyuria. Genitourinary: Negative for dysuria, frequency, hematuria and difficulty urinating. Musculoskeletal: Negative for myalgias, joint swelling. Skin: Negative for pallor and rash. Neurological: Negative for dizziness, tremors, light-headedness and headaches. Psychiatric/Behavioral: Negative for sleep disturbance and dysphoric mood. Objective:      Physical Exam   Vital signs reviewed. General: Well-developed and well-nourished. No acute distress. Skin: Warm, dry and intact. HEENT: Normocephalic. EOMs intact. Conjunctivae normal. Neck supple. Cardiovascular: Normal rate, regular rhythm. Pulmonary/Chest: Normal effort. Lungs clear to auscultation. No rales, rhonchi or wheezing. Abdominal: Positive bowel sounds. Soft, nontender. Nondistended. No rigidity, rebound, or guarding. Musculoskeletal: Movement x4. No edema. Neurological: Gait normal. Alert and oriented to person, place, and time. Psychiatric: Normal mood and affect. Speech and behavior normal. Judgment and thought content normal. Cognition and memory intact. Assessment:       Diagnosis Orders   1. TINO (obstructive sleep apnea)  CBC Auto Differential    Comprehensive Metabolic Panel    Ferritin    Hemoglobin A1C    Iron and TIBC    Vitamin A    TSH without Reflex    T4, Free    PTH, Intact    Magnesium    Vitamin B1, Whole Blood    Vitamin B12    Vitamin D 25 Hydroxy    Zinc    Laurie Koo MD, Plastic Surgery, [place]   2.  Status post gastric bypass for obesity  CBC Auto Differential    Comprehensive Metabolic Debbie Hood MD, Plastic Surgery, [place]   8. Restless leg syndrome  CBC Auto Differential    Comprehensive Metabolic Panel    Ferritin    Hemoglobin A1C    Iron and TIBC    Vitamin A    TSH without Reflex    T4, Free    PTH, Intact    Magnesium    Vitamin B1, Whole Blood    Vitamin B12    Vitamin D 25 Hydroxy    Ceferino Hood MD, Plastic Surgery, [place]   9. Folate deficiency  CBC Auto Differential    Comprehensive Metabolic Panel    Ferritin    Hemoglobin A1C    Iron and TIBC    Vitamin A    TSH without Reflex    T4, Free    PTH, Intact    Magnesium    Vitamin B1, Whole Blood    Vitamin B12    Vitamin D 25 Hydroxy    Ceferino Hood MD, Plastic Surgery, [place]   10. Low zinc level  CBC Auto Differential    Comprehensive Metabolic Panel    Ferritin    Hemoglobin A1C    Iron and TIBC    Vitamin A    TSH without Reflex    T4, Free    PTH, Intact    Magnesium    Vitamin B1, Whole Blood    Vitamin B12    Vitamin D 25 Hydroxy    Ceferino Hood MD, Plastic Surgery, [place]   11. Excess skin of abdomen  CBC Auto Differential    Comprehensive Metabolic Panel    Ferritin    Hemoglobin A1C    Iron and TIBC    Vitamin A    TSH without Reflex    T4, Free    PTH, Intact    Magnesium    Vitamin B1, Whole Blood    Vitamin B12    Vitamin D 25 Hydroxy    Ceferino Hood MD, Plastic Surgery, [address] [place] [address] Avenue:    Dietitian visit today. Patient to continue to increase protein to obtain 60-80g/day, at least 48-64oz of fluid daily, and gradually increase exercise regimen. Bariatric labs ordered. Referral to plastics for excess abdominal skin. Encouraged monthly accountability visits with dietitian. Follow-up  Return in about 6 months (around 5/23/2021).     Orders this encounter:  Orders Placed This Encounter   Procedures    CBC Auto Differential     Standing Status:   Future     Standing Expiration Date:   11/23/2021    Comprehensive Metabolic Panel Standing Status:   Future     Standing Expiration Date:   11/23/2021    Ferritin     Standing Status:   Future     Standing Expiration Date:   11/23/2021    Hemoglobin A1C     Standing Status:   Future     Standing Expiration Date:   11/23/2021    Iron and TIBC     Standing Status:   Future     Standing Expiration Date:   11/23/2021     Order Specific Question:   Is Patient Fasting? Answer:   yes     Order Specific Question:   No of Hours? Answer:   15    Vitamin A     Standing Status:   Future     Standing Expiration Date:   11/23/2021    TSH without Reflex     Standing Status:   Future     Standing Expiration Date:   11/23/2021    T4, Free     Standing Status:   Future     Standing Expiration Date:   11/23/2021    PTH, Intact     Standing Status:   Future     Standing Expiration Date:   11/23/2021    Magnesium     Standing Status:   Future     Standing Expiration Date:   11/23/2021    Vitamin B1, Whole Blood     Standing Status:   Future     Standing Expiration Date:   11/23/2021    Vitamin B12     Standing Status:   Future     Standing Expiration Date:   11/23/2021    Vitamin D 25 Hydroxy     Standing Status:   Future     Standing Expiration Date:   11/23/2021    Zinc     Standing Status:   Future     Standing Expiration Date:   11/23/2021   Dorice Runner, MD, Plastic Surgery, [place]     Referral Priority:   Routine     Referral Type:   Eval and Treat     Referral Reason:   Specialty Services Required     Referred to Provider:   Renetta Trinh MD     Requested Specialty:   Plastic Surgery     Number of Visits Requested:   1       Prescriptions this encounter:  No orders of the defined types were placed in this encounter.       Electronically signed by:  Mike Campbell CNP

## 2020-11-23 NOTE — PROGRESS NOTES
Medical Nutrition Therapy  Metabolic and Bariatric surgery  18 months  follow up note         Pt reports:         Vitals:   Vitals:    11/23/20 0858   BP: 122/76   Site: Right Upper Arm   Position: Sitting   Cuff Size: Large Adult   Pulse: 80   Resp: 20   Temp: 97.2 °F (36.2 °C)   TempSrc: Infrared   Weight: 167 lb (75.8 kg)   Height: 4' 9\" (1.448 m)      Body mass index is 36.14 kg/m². Labs reviewed:     Multivitamin/mineral intake:4  Calcium intake:   2  Other:            Nutrition Assessment:   PES: Inadequate food and beverage intake r/t WLS as evidenced by loss of excess body weight lost 85 lbs over . Goals   60-80gm of protein  48-64oz of fluid  Vitamin adherance  Basic adherance to WLS behavious and this document has been scanned into the chart.        [x] met     []  Not met        Plan:   F/u annually         Shade Fuller

## 2020-12-29 ENCOUNTER — TELEPHONE (OUTPATIENT)
Dept: PSYCHOLOGY | Age: 40
End: 2020-12-29

## 2021-01-21 DIAGNOSIS — K59.01 SLOW TRANSIT CONSTIPATION: ICD-10-CM

## 2021-01-21 DIAGNOSIS — K29.50 ANTRAL GASTRITIS: ICD-10-CM

## 2021-01-21 RX ORDER — PROMETHAZINE HYDROCHLORIDE 25 MG/1
25 TABLET ORAL EVERY 8 HOURS PRN
Qty: 30 TABLET | Refills: 2 | OUTPATIENT
Start: 2021-01-21

## 2021-01-21 RX ORDER — DOCUSATE SODIUM 100 MG/1
100 CAPSULE, LIQUID FILLED ORAL 2 TIMES DAILY PRN
Qty: 60 CAPSULE | Refills: 3 | Status: SHIPPED | OUTPATIENT
Start: 2021-01-21 | End: 2021-03-09 | Stop reason: SDUPTHER

## 2021-01-21 RX ORDER — PANTOPRAZOLE SODIUM 40 MG/1
40 TABLET, DELAYED RELEASE ORAL DAILY
Qty: 60 TABLET | Refills: 3 | Status: SHIPPED | OUTPATIENT
Start: 2021-01-21 | End: 2021-03-09 | Stop reason: SDUPTHER

## 2021-01-21 NOTE — TELEPHONE ENCOUNTER
Patient called request 3 refills, medications pending. She is also requesting a cream for a rash underneath her belly. She has tried a previous powder and baby powder with no relief.  She has also tried Vaseline and diaper cream.  Please advise

## 2021-01-27 ENCOUNTER — OFFICE VISIT (OUTPATIENT)
Dept: SURGERY | Age: 41
End: 2021-01-27
Payer: MEDICARE

## 2021-01-27 VITALS
HEIGHT: 58 IN | WEIGHT: 169.2 LBS | OXYGEN SATURATION: 97 % | SYSTOLIC BLOOD PRESSURE: 122 MMHG | HEART RATE: 84 BPM | DIASTOLIC BLOOD PRESSURE: 79 MMHG | BODY MASS INDEX: 35.52 KG/M2

## 2021-01-27 DIAGNOSIS — E65 SYMPTOMATIC ABDOMINAL PANNICULUS: ICD-10-CM

## 2021-01-27 DIAGNOSIS — M62.08 DIASTASIS OF RECTUS ABDOMINIS: Primary | ICD-10-CM

## 2021-01-27 DIAGNOSIS — R21 RASH: ICD-10-CM

## 2021-01-27 PROCEDURE — G8417 CALC BMI ABV UP PARAM F/U: HCPCS | Performed by: PLASTIC SURGERY

## 2021-01-27 PROCEDURE — G8484 FLU IMMUNIZE NO ADMIN: HCPCS | Performed by: PLASTIC SURGERY

## 2021-01-27 PROCEDURE — G8427 DOCREV CUR MEDS BY ELIG CLIN: HCPCS | Performed by: PLASTIC SURGERY

## 2021-01-27 PROCEDURE — 1036F TOBACCO NON-USER: CPT | Performed by: PLASTIC SURGERY

## 2021-01-27 PROCEDURE — 99203 OFFICE O/P NEW LOW 30 MIN: CPT | Performed by: PLASTIC SURGERY

## 2021-01-27 NOTE — PROGRESS NOTES
[address] [place] [address] [facility] SPECIALISTS  [facility] [id number]       History and Physical     Chief Complaint   Patient presents with    New Patient     Patient states she is here to discuss panniculectomy. HPI:   Dinah Briseno is a 45 y.o. female who presents status post bariatric surgery. Patient's initial weight was 296 pounds. Patient currently weighs 169 pounds. Patient has lost greater than 100 pounds. Patient has maintained her weight for greater than 1 9 months. Patient has a pannus that extends down past her pubis. It causes her pain and discomfort. It affects her quality of life. Patient continues to get rashes. Patient continues to treat rashes. However they do continue to recur. Her large pannus also interferes with her daily activities. It also interferes with her being able to do more exercises. It interferes with her having intercourse and not being to find clothes that fit symmetrically. It also interferes with her urination and seatbelt not fitting well. Her large pannus interferes with her daily hygiene such as cleaning her feet and has an odor to it. This has been going on for greater than 6 months. Patient is here for evaluation treatment. Medications:     Current Outpatient Medications   Medication Sig Dispense Refill    pantoprazole (PROTONIX) 40 MG tablet Take 1 tablet by mouth daily 60 tablet 3    docusate sodium (COLACE) 100 MG capsule Take 1 capsule by mouth 2 times daily as needed for Constipation 60 capsule 3    PARoxetine (PAXIL) 10 MG tablet Take 0.5 tablets by mouth every morning for 7 days, THEN 1 tablet every morning for 23 days.  27 tablet 0    traZODone (DESYREL) 50 MG tablet Take 0.5 tablets by mouth nightly as needed for Sleep 15 tablet 0    promethazine (PHENERGAN) 25 MG tablet Take 1 tablet by mouth every 8 hours as needed for Nausea 30 tablet 2  Multiple Vitamins-Minerals (CELEBRATE MULTI-COMPLETE 36) CHEW Take 1 tablet by mouth 2 times daily 60 tablet 5    calcium carbonate (TUMS) 500 MG chewable tablet Take 1 tablet by mouth 2 times daily 60 tablet 5    prazosin (MINIPRESS) 1 MG capsule Take 1 capsule by mouth nightly 30 capsule 0     No current facility-administered medications for this visit. Allergies: Allergies   Allergen Reactions    Latex Hives and Itching     Review of Systems:   Constitutional: Negative for fever, chills, fatigue and unexpected weight change. HENT: Patient has a history of migraines. Eyes: Negative for pain and discharge. Respiratory: Patient has a history of sleep apnea. Cardiovascular patient has a history of hypertension. Gastrointestinal: Patient has a history of gastric reflux. Skin: Negative for pallor and rash. Neurological: Patient has a history of neuropathy. Hematological: Does not bruise/bleed easily. Psychiatric/Behavioral: Negative for behavioral problems. Has a history of anxiety.     Past Medical History:   Diagnosis Date    Gastric reflux     History of anxiety     history of panic attacks    Hypertension     Migraine     Neuropathy     Obesities, morbid ([address] 8/31/2018    TINO (obstructive sleep apnea) 12/17/2018    USES CPAP    Suicide attempt ([address]     Wears glasses      Past Surgical History:   Procedure Laterality Date    ABDOMEN SURGERY      CHOLECYSTECTOMY, LAPAROSCOPIC  10/07/2019    Laparoscopic cholecystectomy    CHOLECYSTECTOMY, LAPAROSCOPIC N/A 10/7/2019    XI LAPAROSCOPIC ROBOTIC CHOLECYSTECTOMY performed by Jordan Lynn DO at [address] [facility]  04/01/2019    ROBOTIC LAPAROSCOPIC GASTRIC BYPASS ZAID-EN-Y, LIVER BIOPSY, EGD     ZAID-EN-Y GASTRIC BYPASS N/A 4/1/2019    XI ROBOTIC LAPAROSCOPIC GASTRIC BYPASS ZAID-EN-Y, LIVER BIOPSY, EGD performed by Jordan Lynn DO at [facility]  UPPER GASTROINTESTINAL ENDOSCOPY N/A 12/7/2018    EGD ESOPHAGOGASTRODUODENOSCOPY performed by Tara Gilmore DO at [facility]    UPPER GASTROINTESTINAL ENDOSCOPY N/A 6/14/2019    egd  performed by Tara Gilmore DO at [facility]    WISDOM TOOTH EXTRACTION       Social History     Socioeconomic History    Marital status:      Spouse name: Not on file    Number of children: Not on file    Years of education: Not on file    Highest education level: Not on file   Occupational History    Not on file   Social Needs    Financial resource strain: Not on file    Food insecurity     Worry: Not on file     Inability: Not on file    Transportation needs     Medical: Not on file     Non-medical: Not on file   Tobacco Use    Smoking status: Passive Smoke Exposure - Never Smoker    Smokeless tobacco: Never Used   Substance and Sexual Activity    Alcohol use: Yes     Comment: social     Drug use: Not Currently     Comment: drug use history as a teen    Sexual activity: Yes   Lifestyle    Physical activity     Days per week: Not on file     Minutes per session: Not on file    Stress: Not on file   Relationships    Social connections     Talks on phone: Not on file     Gets together: Not on file     Attends Rastafarian service: Not on file     Active member of club or organization: Not on file     Attends meetings of clubs or organizations: Not on file     Relationship status: Not on file    Intimate partner violence     Fear of current or ex partner: Not on file     Emotionally abused: Not on file     Physically abused: Not on file     Forced sexual activity: Not on file   Other Topics Concern    Not on file   Social History Narrative    Not on file     Family History   Problem Relation Age of Onset    Thyroid Disease Mother     Diabetes Mother     Heart Disease Mother     High Blood Pressure Mother     Migraines Mother     Diabetes Father     Heart Disease Father  Thyroid Disease Father     Liver Disease Father     High Blood Pressure Father     Cancer Father         liver    Alcohol Abuse Father     Depression Father     Thyroid Disease Sister     Neuropathy Sister     High Blood Pressure Sister     Kidney Disease Paternal Aunt     Heart Disease Paternal Uncle     Migraines Maternal Grandmother     Stroke Maternal Grandfather     Cancer Maternal Grandfather         breast    Other Paternal Grandmother         epilepsy    Diabetes Paternal Grandmother     High Blood Pressure Paternal Grandmother     Diabetes Paternal Grandfather     High Blood Pressure Paternal Grandfather     Heart Disease Paternal Grandfather      Physical Exam:   /79 (Site: Left Upper Arm, Position: Sitting, Cuff Size: Medium Adult)   Pulse 84   Ht 4' 10\" (1.473 m)   Wt 169 lb 3.2 oz (76.7 kg)   LMP 2021 (Exact Date)   SpO2 97%   BMI 35.36 kg/m²    Body mass index is 35.36 kg/m². Physical Exam   Nursing note and vitals reviewed. Constitutional: Oriented to person, place, and time. Appears well-developed and well-nourished. No distress. Head: Normocephalic and atraumatic. Eyes: Conjunctivae and EOM are normal.   Pulmonary/Chest: Effort normal. No respiratory distress. Neurological: Alert and oriented to person, place, and time. Skin: Skin is warm and dry. No rash noted today. Abdomen soft nontender nondistended rectus diastases is present. Psychiatric: Normal mood and affect. Behavior is normal  PANNICULECTOMY HEALTH CHECKLIST:  Height: Height: 4' 10\" (147.3 cm)   Weight: Weight: 169 lb 3.2 oz (76.7 kg)    BMI: Body mass index is 35.36 kg/m². Does your pannus affect your daily activities and quality of life? Yes          How lon2020   Which daily living activities are affected in the following ways?   Cleaning of feet - yes   Odor - yes   Interferes with exercise - yes   Seba Dalkai - yes   Painful pulling of abdominal skin - yes Clothes not fitting - yes           Urination - yes   Pulling of pubic hair - no   Seat belt not fitting - yes   Other:      Back pain? Yes              Any Treatment? No        Any Testing? No   Where/What:     Have you had gastric bypass? Yes              Initial weight: 296 lb        Weight stable for how long? 9+ months   Do you have a hernia? No        Testing: No     Date/location:   Have you had a hernia repair in the past?       No        Was mesh used? No        Surgeon for hernia:    Any Rashes/Ulcers under folds of skin? Yes              Medications used:                                                        OTC Baby Powder, diaper cream                                                                                               RX Nystatin Powder         Notes:   Imaging:   @[address]@        Impression/Plan:      Diagnosis Orders   1. Diastasis of rectus abdominis  CT ABDOMEN PELVIS W IV CONTRAST Additional Contrast? Oral   2. Symptomatic abdominal panniculus     3. Rash       Patient Active Problem List   Diagnosis    Chronic migraine    Neuropathy    Vitamin D deficiency    Antral gastritis    TINO (obstructive sleep apnea)    Status post gastric bypass for obesity    Restless leg syndrome    Folate deficiency    Low zinc level    Gastroesophageal reflux disease without esophagitis    Obesity (BMI 30-39. 9)    Bipolar II disorder, most recent episode depressed with rapid cycling ([facility]    Near syncope    Major depressive disorder, recurrent episode with anxious distress ([facility]    PTSD (post-traumatic stress disorder)    Excess skin of abdomen    Symptomatic abdominal panniculus    Diastasis of rectus abdominis    Rash     Plan:  I discussed the differences between panniculectomy and abdominoplasty. The risk of both procedures were discussed with patient in detail. All questions were answered. We also discussed hypertension and effects on postoperative bleeding. The following information was discussed with the Patient, but this is not an all-inclusive-other issues were also discussed with patient. I also discussed the following with the patient. I discussed loss of umbilicus. I discussed scars. I discussed dogears. I discussed wound healing. I discussed skin necrosis. I also discussed that status post bariatric surgery that the skin has stretched beyond its limited and there is still there to be elasticity and deformity. We discussed fat necrosis. We discussed postoperative seromas. We discussed postoperative bleeding. Also discussed malposition of the umbilicus. GENERAL INFORMATION  Panniculectomy is a surgical procedure to remove excess skin and fatty tissue from the lower abdomen wall. Panniculectomy does not treat muscle laxity of the upper abdomen. Obese individuals who intend to lose weight should postpone all forms of body contouring surgery until they have reached a stable weight. There are a variety of different techniques used by plastic surgeons for panniculectomy. Panniculectomy can be combined with other forms of body-contouring surgery, including suction-assisted lipectomy, or performed at the same time with other elective surgeries. ALTERNATIVE TREATMENTS  Alternative forms of management consist of not treating the areas of loose skin and fatty deposits. Liposuction may be a surgical alternative to if there is good skin tone and localized abdominal fatty. If you have lost your skin laxity as apparent by multiple stretch marks you will not be a good liposuction candidate. Diet and exercise programs may be of benefit in the overall reduction of excess body fat and contour improvement. Risks and potential complications are associated with alternative surgical forms of treatment.     RISKS OF PANNICULECTOMY SURGERY Every surgical procedure involves a certain amount of risk and it is important that you understand these risks and the possible complications associated with them. In addition, every procedure has limitations. An individual's choice to undergo a surgical procedure is based on the comparison of the risk to potential benefit. Although the majority of patients do not experience these complications, you should discuss each of them with your plastic surgeon to make sure you completely understand all possible consequences of a abdominoplasty/panniculectomy. Bleeding- It is possible, though unusual, to experience a bleeding episode during or after surgery. There is blood in the pannus, and depending the size of your pannus you may be subjected to considerable blood loss. Should post-operative bleeding occur, it may require an emergency treatment to drain the accumulated blood or blood transfusion. Intra-operative blood transfusions may be required. Do not take any aspirin or anti-inflammatory medications for ten days before surgery, as this may increase the risk of bleeding. Non-prescription herbs and dietary supplements can increase the risk of surgical bleeding. Hematoma can occur at any time following injury. If blood transfusions are needed to treat blood loss, there is a risk of blood related infections such as hepatitis and the HIV (AIDS). Heparin medications that are used to prevent blood clots in veins can produce bleeding and decreased blood platelets. Infection - Infection is can occur after this surgery. Should an infection occur, treatment including antibiotics, hospitalization, or additional surgery may be necessary. There is a greater risk of infection when body contouring procedures are performed in conjunction with abdominal surgical procedures. Change in Skin Sensation- It is common to experience diminished (or loss) of skin sensation in areas that have had surgery. Diminished (or complete loss of skin sensation) may not totally resolve after an abdominoplasty/pannicular     Skin Contour Irregularities- Contour and shape irregularities and depressions may occur after abdominoplasty. Visible and palpable wrinkling of skin can occur. Residual skin irregularities at the ends of the incisions or dog ears are always a possibility as is skin pleating when there is excessive redundant skin. This may improve with time, or it can be surgically corrected. Major Wound Separation- Wounds may separate after surgery. Should this occur, additional treatment including surgery may be necessary. Skin Discoloration / Swelling- Bruising and swelling normally occurs following panniculectomy. The skin in or near the surgical site can appear either lighter or darker than surrounding skin. Although uncommon, swelling and skin discoloration may persist for long periods of time and, in rare situations, may be permanent. Skin Sensitivity- Itching, tenderness, or exaggerated responses to hot or cold temperatures may occur after surgery. Usually this resolve during healing, but in some situations it may be chronic. Sutures- Most surgical techniques use deep sutures. You may notice these sutures after your surgery. Sutures may spontaneously poke through the skin, become visible or produce irritation that requires removal.    Damage to Deeper Structures- There is the potential for injury to deeper structures including nerves, blood vessels, muscles, and lungs (pneumothorax), or intra-abdominal injury can occure during any surgical procedure. The potential for this to occur varies according to the type of procedure being performed. Injury to deeper structures may be temporary or permanent. Fat Necrosis- Fatty tissue found deep in the skin might die. This may produce areas of firmness within the skin. Additional surgery to remove areas of fat necrosis may be necessary. There is the possibility of contour irregularities in the skin that may result from fat necrosis. Obesity: If you're BMI is greater than 30 you may have a higher chance of complications. This may include but not limited to wound healing and infections. Also if you have other medical problems such as diabetes and hypertension it may effect your healing as well as your surgical results in bleeding. Umbilicus- Malposition, scarring, unacceptable appearance or loss of the umbilicus (navel) may occur. You may lose the umbilicus when this is combined with a hernia repairs, or due to the strech of the skin the umbilicus has stretched out so much that it can not be salvaged    Pubic Distortion- There will be distortion of their labia and pubic area. Additional treatment including surgery may be necessary. Even with additional surgery she may never have symmetry. At times you may have painful intercourse. Scarring-  All surgery leaves scars, some more visible than others. This surgery will leave large scars. Abnormal scars may occur within the skin and deeper tissues depending on your healing. Scars may be unattractive and of different color than surrounding skin. Scar appearance may also vary within the same scar, exhibit contour variations or \"bunching\" due to the amount of excess skin. Scars may be asymmetrical (appear different between right and left side of the body). There is the possibility of visible marks in the skin from sutures. In some cases scars may require surgical revision or treatment. Surgical Anesthesia- Both local and general anesthesia involve risk.   There is the possibility of complications, injury, and even death from all forms of surgical anesthesia or sedation Asymmetry-  Symmetrical body appearance may not result from panniculectomy. Factors such as skin tone, fatty deposits, skeletal prominence, and muscle tone may contribute to normal asymmetry in body features. Additional surgery may be necessary to attempt to attempt to improve asymmetry. Allergic Reactions- In some cases, local allergies to tape, suture material and glues, blood products, topical preparations or injected agents have been reported. Serious systemic reactions including shock (anaphylaxis) may occur to drugs used during surgery and prescription medications. Allergic reactions may require additional treatment. Delayed Healing- Wound disruption or delayed wound healing is possible. Some areas of the abdomen may not heal normally and may take a long time to heal.  Some areas of skin or tissue may die. This may require frequent dressing changes or further surgery to remove the non-healed tissue. Smokers have a greater risk of skin loss and wound healing complications. Also patient with certain systemic disease or taking certain medications are at increased risk. Also infections under the pannus may effect your healing. Seroma- Fluid accumulations infrequently occur in between the skin and the abdominal wall. This may require additional procedures for drainage of fluid. Shock- In rare circumstances, your surgical procedure can cause severe trauma, particularly when multiple or extensive procedures are performed. Although serious complications are infrequent, infections or excessive fluid loss can lead to severe illness and even death. If surgical shock occurs, hospitalization and additional treatment would be necessary. Surgical Wetting Solutions-There is the possibility that large volumes of fluid containing dilute local anesthetic drugs and epinephrine that is injected into fatty deposits during surgery may contribute to fluid overload or systemic reaction to these medications. Additional treatment including hospitalization may be necessary. Persistent Swelling (Lymphedema)- Persistent swelling in the legs can occur following panniculectomy. Pain- You will experience pain after your surgery. Pain of varying intensity and duration may occur and persist after panniculectomy. Chronic pain may occur very  from nerves becoming trapped in scar tissue after panniculectomy. There may be numbness that can occur after a panniculectomy this could be temporary or permanent    Unsatisfactory Result- There is no guarantee or warranty expressed or implied, on the results that may be obtained. You may be disappointed with the results of panniculectomy surgery. This would include risks such as asymmetry, unsatisfactory or highly visible surgical scar location, unacceptable visible deformities, bunching and rippling in the skin near the suture lines or at the ends of the incisions (dog ears), poor healing, wound disruption, and loss of sensation. It may not be possible to correct or improve the effects of surgical scars. In some situations, it may not be possible to achieve optimal results with a single surgical procedure. Additional surgery may be required to improve results. Deep Venous Thrombosis, Cardiac and Pulmonary Complications- Surgery, especially longer procedures, may be associated with the formation of, or increase in, blood clots in the venous system. Pulmonary complications may occur secondarily to both blood clots (pulmonary emboli), fat deposits (fat emboli) or partial collapse of the lungs after general anesthesia. Pulmonary and fat emboli can be life-threatening or fatal in some circumstances. Air travel, inactivity and other conditions may increase the incidence of blood clots traveling to the lungs causing a major blood clot that may result in death. It is important to discuss with your physician any past history of blood clots, swollen legs or the use of estrogen or birth control pills that may contribute to this condition. Cardiac complications are a risk with any surgery and anesthesia, even in patients without symptoms. Should any of these complications occur, you may require hospitalization and additional treatment. If you experience shortness of breath, chest pains, or unusual heart beats, seek medical attention immediately. ADDITIONAL ADVISORIES    Long-Term Results- Subsequent alterations in the appearance of your body may occur as the result of aging, sun exposure, weight loss, weight gain, pregnancy, menopause or other circumstances not related to your surgery. Metabolic Status of Massive Weight Loss Patients- Your personal metabolic status of blood chemistry and protein levels may be abnormal following massive weight loss and surgical procedures to make a patient loose weight. Individuals with abnormalities may be a risk for serious medical and surgical complications, including delayed wound healing, infection or even in rare cases, death. Body-Piercing Procedures- Individuals who currently wear body-piercing jewelry or are seeking to undergo body-piercing procedures must consider the possibility that an infection could develop anytime following this procedure. Treatment including antibiotics, hospitalization or additional surgery may be necessary. Intimate Relations After Surgery- Surgery involves coagulating of blood vessels and increased activity of any kind may open these vessels leading to a bleed, or hematoma. Increased activity that increased your pulse or heart rate may cause additional bruising, swelling, and the need for return to surgery and control bleeding. It is wise to refrain from sexual activity until your physician states it is safe. Medications-  There are many adverse reactions that occur as the result of taking over-the-counter, herbal, and/or prescription medications. Be sure to check with your physician about any drug interactions that may exist with medications that you are already taking. If you have an adverse reaction, stop the drugs immediately and call your plastic surgeon for further instructions. If the reaction is severe, go immediately to the nearest emergency room. When taking the prescribed pain medications after surgery, realize that they can affect your thought process and coordination. Do not drive, do not operate complex equipment, do not make any important decisions and do not drink any alcohol while taking these medications. Be sure to take your prescribed medication only as directed. If at blood thinners such as aspirin and Coumadin or Plavix etc. Is prescribed by a physician you must consult with the prescribing physician prior to stopping any of the blood thinners. Our focus is improvement rather than perfection. Complications or less than satisfactory results are sometimes unavoidable, may require additional surgery and often are stressful. Follow all physician instructions carefully; this is essential for the success of your outcome. It is important that the surgical incisions are not subjected to excessive force, swelling, abrasion, or motion during the time of healing. Personal and vocational activity needs to be restricted. Protective dressings and drains should not be removed unless instructed by me. Successful post-operative function depends on both surgery and subsequent care. Physical activity that increases your pulse or heart rate may cause bruising, swelling, fluid accumulation and the need for return to surgery. It is wise to refrain from intimate physical activities after surgery until your physician states it is safe. It is important that you participate in follow-up care, return for aftercare, and promote your recovery after surgery. FINANCIAL RESPONSIBILITIES  The cost of surgery involves several charges for the services provided. The total includes fees charged by your surgeon, the cost of surgical supplies, anesthesia, laboratory tests, and possible hospital charges, depending on where the surgery is performed. Depending on whether the cost of surgery is covered by an insurance plan, you will be responsible for necessary co-payments, deductibles, and charges not covered. The fees charged for this procedure do not include any potential future costs for additional procedures that you elect to have or require in order to revise, optimize, or complete your outcome. Additional costs may occur should complications develop from the surgery. Secondary surgery or hospital day-surgery charges involved with revision surgery will also be your responsibility.     HEALTH INSURANCE Most health insurance companies exclude coverage for cosmetic surgical operations any complications that might occur from surgery. Please carefully review your health insurance subscriber-information pamphlet or contact your insurance company for a detailed explanation of their policies for covering abdominoplasty/panniculectomy procedures. Most insurance plans may exclude coverage for secondary or revisionary surgery. ASPS consent abdominoplasty    GENERAL INFORMATION  Abdominoplasty is a surgical procedure to remove excess skin and fatty tissue from the middle and lower abdomen and to tighten muscles of the abdominal wall. Abdominoplasty is not a surgical treatment for being overweight. Weight changes will affect your body contouring results. You should not have body contouring until you have reached a stable weight. ALTERNATIVE TREATMENTS  Alternative forms of management consist of not treating the areas of loose skin and fatty deposits. Liposuction may be a surgical alternative to abdominoplasty if there is good skin tone and localized abdominal fatty deposits in an individual of normal weight. Diet and exercise programs may be of benefit in the overall reduction of excess body fat and contour improvement. Risks and potential complications are also associated with alternative surgical forms of treatment. INHERENT RISKS OF ABDOMINOPLASTY SURGERY  Every surgical procedure involves a certain amount of risk and it is important that you understand these risks and the possible complications associated with them. In addition, every procedure has limitations. An individual's choice to undergo a surgical procedure is based on the comparison of the risk to potential benefit. SPECIFIC RISKS OF ABDOMINOPLASTY SURGERY  Change in Skin Sensation: It is common to experience diminished (or loss) of skin sensation in areas that have had surgery. Diminished (or complete loss of skin sensation) may not totally resolve after an abdominoplasty. Skin Contour Irregularities:  Contour and shape irregularities and depressions may occur after abdominoplasty. Visible and palpable wrinkling of skin can occur. Residual skin irregularities at the ends of the incisions or dog ears are always a possibility as is skin pleating when there is excessive redundant skin. This may improve with time, or it can be surgically corrected. Major Wound Separation:  Wounds may separate after surgery. Should this occur, additional treatment including surgery may be necessary. Umbilicus: Malposition, scarring, unacceptable appearance or loss of the umbilicus (navel) may occur. Pubic Distortion: It is possible, though unusual, for women to develop distortion of their labia and pubic area. Should this occur, additional treatment including surgery may be necessary. Use of Platelet Gel or Fibrin Sealants Tissue Glue:  Platelet Gel (from your blood) and Fibrin sealants (from heat-treated human blood components to  inactivate virus transmission) may be used to hold tissue layers together at surgery and to diminish postoperative bruising following an abdominoplasty. Sealants have been carefully produced from screened donor blood plasma for hepatitis, syphilis, and human immunodeficiency virus (HIV). These products  have been used safely for many years as sealants in cardiovascular and general surgery. This product is thought to be of help in diminishing surgical bleeding and by adhering layers of tissue together.   GENERAL RISKS OF SURGERY  Healing Issues: Certain medical conditions, dietary supplements and medications may delay and interfere with healing. Patients with massive weight loss may have a healing delay that could result in the incisions coming apart, infection, and tissue changes resulting in the need for additional medical care, surgery, and prolonged hospitalizations. Patients with diabetes or those taking medications such as steroids on an extended basis may have prolonged healing issues. Smoking will cause a delay in the healing process, often resulting in the need for additional surgery. There are general risks associated with healing such as swelling, bleeding, possibility of additional surgery, prolonged recovery, color changes, shape changes, infection, not meeting your goals and expectations, and added expense to the patient. There may also be a longer recovery due to the length of surgery and anesthesia. Patients with significant skin laxity (patients seeking facelifts, breast lifts, abdominoplasty, and body lifts) will continue to have the same lax  skin after surgery. The quality or elasticity of skin will not change, and recurrence of skin looseness will occur at some time in the future, quicker for some than others. There are nerve endings that may become involved with healing scars from surgery such as suction-assisted lipectomy, abdominoplasty, facelifts, body lifts, and extremity surgery. While there may not be a major nerve injury, the small nerve endings during the healing period may become too active producing a painful or oversensitive area due to the small sensory nerve involved with scar tissue. Often, massage and early non-surgical intervention resolves this. It is important to discuss post-surgical pain with your surgeon. Bleeding: It is possible, to experience a bleeding episode during or after surgery.  Should postoperative  bleeding occur, it may require emergency treatment to drain accumulated blood or you may require a blood transfusion. Increased activity too soon after surgery  can lead to increased chance of bleeding and additional surgery. It is important to follow postoperative instructions and limit exercise and strenuous activity for the instructed time. Do not take any aspirin or anti-inflammatory medications for at least ten days before or after surgery, as this may increase the risk of bleeding. Non-prescription herbs and dietary supplements can increase the risk of surgical bleeding. Hematoma can occur at any time, usually in the first three weeks following injury to the operative area. If blood transfusions are necessary to treat blood loss, there is the risk of blood-related infections such as hepatitis and HIV (AIDS). Heparin medications that are used to prevent blood clots in veins can produce bleeding and decreased blood platelets. Infection:  Infection can occur after surgery. Should an infection occur, additional treatment including antibiotics, hospitalization, or additional surgery may be necessary. It is important to tell your surgeon of any other infections, such as ingrown toenail, insect bite, or urinary tract infection. Remote infections, infection in other part of the body, may lead to an infection in the operated area. Scarring: All surgery leaves scars, some more visible than others. Although wound healing after a surgical  procedure is expected, abnormal scars may occur within the skin and deeper tissues. Scars may be unattractive and of different color than the surrounding skin tone. Scar appearance may also vary within the same scar. Scars may be asymmetrical (appear different on the right and left side of the body). There is the possibility of visible marks in the skin from sutures. In some cases, scars may require surgical revision or treatment. Firmness:  Excessive firmness can occur after surgery due to internal scarring.  The occurrence of this is not predictable. Additional treatment including surgery may be necessary. Change in Skin Sensation: It is common to experience diminished (or loss) of skin sensation in areas that have had surgery. Diminished (or complete loss of skin sensation) may not totally resolve. Skin Contour Irregularities:  Contour and shape irregularities may occur. Visible and palpable wrinkling of skin may occur. Residual  skin irregularities at the ends of the incisions or dog ears are always a possibility when there is  excessive redundant skin. This may improve with time, or it can be surgically corrected. Skin Discoloration / Swelling:  Some bruising and swelling will normally occur. The skin in or near the surgical site can appear either lighter or darker than surrounding skin. Although uncommon, swelling and skin discoloration may persist for long periods of time and, in rare situations, may be permanent. Skin Sensitivity:  Itching, tenderness, or exaggerated responses to hot or cold temperatures may occur after surgery. Usually this resolves during healing, but in some situations it may be chronic. Major Wound Separation:  Wounds may separate after surgery. Should this occur, additional treatment including surgery may be necessary. Sutures:  Most surgical techniques use deep sutures. You may notice these sutures after your surgery.  Sutures may spontaneously poke through the skin, become visible or produce irritation that requires suture removal.  Delayed Healing: Wound disruption or delayed wound healing is possible. Some areas of the skin may not heal normally and may take a long time to heal. Areas of skin may die. This may require frequent dressing changes or further surgery to remove the non-healed tissue. Individuals who have decreased blood supply to tissue from past surgery or radiation therapy may be at increased risk for wound healing and poor surgical outcome. Smokers have a greater risk of skin loss and wound healing complications. Damage to Deeper Structures: There is the potential for injury to deeper structures including nerves, blood vessels, muscles, and lungs (pneumothorax) during any surgical procedure. The potential for this to occur varies according to the type of procedure being performed. Injury to deeper structures may be temporary or permanent. Fat Necrosis:  Fatty tissue found deep in the skin might die. This may produce areas of firmness within the skin. Additional surgery to remove areas of fat necrosis may be necessary. There is the possibility of contour irregularities in the skin that may result from fat necrosis. Seroma:  Fluid may accumulate between the skin and the underlying tissues following surgery, trauma  or vigorous exercise. Should this problem occur, it may require additional procedures for drainage of fluid. Surgical Anesthesia:  Both local and general anesthesia involves risk. There is the possibility of complications, injury, and even death from all forms of surgical anesthesia or sedation. Shock: In rare circumstances, your surgical procedure can cause severe trauma, particularly when multiple or extensive procedures are performed. Although serious complications can occur infections or excessive fluid loss can lead to severe illness and even death. If surgical shock occurs, hospitalization and additional treatment would be necessary.   Pain: You will experience pain after your surgery. Pain of varying intensity and duration may occur and persist after surgery. Chronic pain may occur very infrequently from nerves becoming trapped in scar tissue or due to tissue stretching. Cardiac and Pulmonary Complications:  Pulmonary complications may occur secondarily to blood clots (pulmonary emboli), fat deposits (fat emboli) or partial collapse of the lungs after general anesthesia. Pulmonary emboli can be life threatening or fatal in some circumstances. Inactivity and other conditions may increase the incidence of blood clots traveling to the lungs causing a major blood clot that may result in death. It is important to discuss if you have any past history of swelling in your legs or blood clots that may contribute to this condition. Cardiac complications are a risk with any surgery and anesthesia, even in patients without symptoms. If you experience shortness of breath, chest pains, or unusual heart beats, seek medical attention immediately. Should any of these complications occur, you may require hospitalization and additional treatment. Venous Thrombosis and Sequelae: Thrombosed veins, which resemble cords, occasionally develop in the area of the breast or around IV sites, and usually resolve without medical or surgical treatment. It is important to discuss with me surgeon any birth control pills you are taking. Certain high estrogen pills may increase your risk of thrombosed veins. Allergic Reactions:  In Some cases, local allergies to tape, suture material and glues, blood products, topical preparations or injected agents have been reported. Serious systemic reactions including shock (anaphylaxis) may occur in response to drugs used during surgery and prescription medicines. Allergic reactions may require additional treatment.   Drug Reactions: Unexpected drug allergies, lack of proper response to medication, or illness caused by the prescribed drug are possibilities. It is important for you to inform your physician of any problems you have had with any medication or allergies to medication, prescribed or over the counter, as well as medications you now regularly take. Asymmetry:  Symmetrical body appearance may not result after surgery. Factors such as skin tone, fatty deposits, skeletal prominence, and muscle tone may contribute to normal asymmetry in body features. Most patients have differences between the right and left side of their bodies before any surgery is performed. Additional surgery may be necessary to attempt to diminish asymmetry. Surgical Wetting Solutions: There is the possibility that large volumes of fluid containing dilute local anesthetic drugs and epinephrine that is injected into fatty deposits during surgery may contribute to fluid overload or systemic reaction to these medications. Additional treatment including hospitalization may be necessary. Persistent Swelling (Lymphedema):  Persistent swelling can occur following surgery.   Unsatisfactory Result:  Although results are expected, there is no guarantee or warranty expressed or implied, on the results that may be obtained. The body is not asymmetric and almost everyone has some degree of unevenness which may not be recognized in advance. One side of the face may be slightly larger, one side of the face droopier. The breast and trunk area exhibit the same possibilities. Many of such issues cannot be fully corrected with surgery. The more realistic your expectations as to results, the better your results will be in your eye. Some patients never achieve their desired goals or results, at no fault of the surgeon or surgery. You may be disappointed with the results of surgery. Asymmetry, unanticipated shape and size, loss of function, wound disruption, poor healing, and loss of sensation may occur after surgery. Size may be incorrect. Unsatisfactory surgical scar location or appearance may occur. It may be  necessary to perform additional surgery to improve your results. ADDITIONAL ADVISORIES  Smoking, Second-Hand Smoke Exposure, Nicotine Products (Patch, Gum, Nasal Spray):  Patients who are currently smoking or use tobacco or nicotine products (patch, gum, or nasal spray) are at a greater risk for significant surgical complications of skin dying and delayed healing and additional scarring. Individuals exposed to second-hand smoke are also at potential risk for similar complications attributable to nicotine exposure. Additionally, smoking may have a significant negative effect on anesthesia and recovery from anesthesia, with coughing and possibly increased bleeding.  Individuals who are not exposed to tobacco smoke or nicotine-containing products have a significantly lower risk of this type of complication. I acknowledge that I will inform my physician if I continue to smoke within this time frame, and understand that for my safety, the surgery, if possible, may be delayed. Smoking may have such a negative effect on your surgery that a urine test just before surgery may be done which will prove the presence of Nicotine. If positive, your surgery may be cancelled and your surgery,   Sleep Apnea / CPAP:   Individuals who have breathing disorders such as Obstructive Sleep Apnea and who may rely upon CPAP devices (constant positive airway pressure) or utilize nighttime oxygen are advised that they are at a substantive risk for respiratory arrest and death when they take narcotic pain medications following surgery. This is an important consideration when evaluating the safety of surgical procedures in terms of very serious complications, including death, that relate to pre-existing medical conditions. Surgery may be considered only with monitoring afterwards in a hospital setting in order to reduce risk of potential respiratory complications and to safely manage pain following surgery.      Medications and Herbal Dietary Supplements: There are potential adverse reactions that occur as the result of taking over the counter, herbal, and/or prescription medications. Aspirin and medications that contain aspirin interfere with bleeding. These include non-steroidal anti-inflammatories such as Motrin, Advil, and Aleve. It is very important not to stop drugs that interfere with platelets, such as Plavix, which is used after a stent. It is important if you have had a stent and are taking Plavix that you inform the plastic surgeon. Stopping Plavix may result in a heart attack, stroke and even death. Be sure to check with your prescribing physician prior to stopping any medications. You may have drug interactions that may exist with medications which you are already taking. If you have an adverse reaction, stop the drugs immediately and call for instructions. If the reaction is severe, go immediately to the nearest emergency room. When taking the prescribed pain medications after surgery, realize that they can affect your thought process and coordination. Do not drive, do not operate complex equipment, do not make any important decisions and do not drink any alcohol while taking these medications. Be sure to take your prescribed medication only as directed. Sun Exposure  Direct or Tanning Salon:  The effects of the sun are damaging to the skin. Exposing the treated areas to sun may result in  increased scarring, color changes, and poor healing. Patients who tan, either outdoors or in a salon, should inform their surgeon and either delay treatment, or avoid tanning until the you are told that it is safe to resume. The damaging effect of sun exposure occurs even with the use sun block or clothing coverage.   Travel Plans: Any surgery holds the risk of complications that may delay healing and your return to normal life. Please let the surgeon know of any travel plans, important commitments already scheduled or planned, or time demands that are important to you, so that appropriate timing of surgery can occur. There are no guarantees that you will be able to resume all activities in the desired time frame. Long-Term Results:  Subsequent alterations in the appearance of your body may occur as the result of aging, sun exposure, weight loss, weight gain, pregnancy, menopause or other circumstances not related to your surgery. Body-Piercing Procedures:  Individuals who currently wear body-piercing jewelry in the surgical region are advised that an infection could develop from this activity. .  Intimate Relations After Surgery:  Surgery involves coagulating of blood vessels and increased activity of any kind may open these vessels leading to a bleed, or hematoma. Activity that increases your pulse or heart rate may cause additional bruising, swelling, and the need for return to surgery to control bleeding. It is wise to refrain from intimate physical activities until your physician states it is safe. Mental Health Disorders and Elective Surgery: It is important that all patients seeking to undergo elective surgery have realistic expectations that focus on improvement rather than perfection. Complications or less than satisfactory results are sometimes unavoidable, may require additional surgery and often are stressful. lthough many individuals may benefit psychologically from the results of elective  surgery, effects on mental health cannot be accurately predicted.   DVT/PE Risks and Advisory: There is a risk of blood clots, Deep Vein Thrombosis (DVT) and Pulmonary Embolus (PE) with every surgical procedure. It varies with the risk factors below. The higher the risk factors, the greater the risk and the more involved you must be in both understanding these risks and, when permitted by your physician, walking and moving your legs. There may also be leg stockings, squeezing active leg devices, and possibly medicines to help lower your risk. There are many conditions that may increase or affect risks of clotting. Inform your doctor about any past or present history of any of the following:  Past History of Blood Clots  Family History of Blood Clots  Birth Control Pills  Swollen Legs  History of Cancer  Large Dose Vitamins  Varicose Veins  Past Illnesses of the Heart, Liver, Lung, or Gastrointestinal Tract. I understand the risks relating to DVT/PE and how important it is to comply with therapy as  discussed with my surgeon. The methods of preventative therapy include:  Early ambulation when allowed  Compression devices (SCD/ICD)  ASA protocol when allowed (Aspirin)  Heparin protocol when allowed  Enoxaparin protocol when allowed  The risks of DVT/PE may be almost as great as the prophylactic therapy when involving Aspirin, Heparin,  and Exoxaparin. Be aware that if your surgery is elective, those patients with very high risks should consider not proceeding with such elective surgery.   ADDITIONAL SURGERY NECESSARY (Re-Operations) REVISION POLICY  Surgical revision surgery is a common part of elective surgery. Your procedure will not stop you from aging, sagging, scarring, or experiencing ongoing skin changes that are more genetically controlled. If revision surgery is either desired or advisable within one year after the initial surgery, there may be a Fees associated with it. HEALTH INSURANCE  Most health insurance companies exclude coverage for cosmetic surgical operations or any resulting complications. Please carefully review your health insurance subscriber-information pamphlet. Most insurance plans exclude coverage for secondary or revisionary surgery due to complications of cosmetic surgery. It is unethical and fraudulent to bill insurance for cosmetic procedures. We cannot participate in such activities. FINANCIAL RESPONSIBILITIES   The cost of surgery involves several charges for the services provided. The total includes fees charged by your surgeon, the cost of surgical supplies, anesthesia, laboratory tests, and possible outpatient hospital charges, depending on where the surgery is performed. Depending on whether the cost of surgery is covered by an insurance plan, you will be responsible for necessary co-payments, deductibles, and charges not covered. The fees charged for this procedure do not include any potential future costs for additional procedures that you elect to have or require in order to revise, optimize, or complete your outcome. Additional costs may occur should complications develop from the surgery. Secondary surgery or hospital day-surgery charges involved with revision surgery will also be your responsibility. In signing  the consent for this surgery/procedure, you acknowledge that you have been informed about its risk and consequences and accept responsibility for the clinical decisions that were made along with the financial costs of all future treatments. Electronically signed by:  Yury Nolen MD 1/27/2021

## 2021-01-27 NOTE — LETTER
[facility]  Formerly [facility], [address] [place] [address]  Phone: [phone]  Fax: [phone]    Otho Frankel, MD        January 27, 2021     Vaishali Khan, [address] [id number] [address]    Patient: Jeff Johnson  MR Number: [id number]  YOB: 1980  Date of Visit: 1/27/2021    Dear Dr. Vaishali Khan:    Thank you for the request for consultation for Kamar Mcneill to me for the evaluation of abdominal panniculus. . Below are the relevant portions of my assessment and plan of care. Plan:  I discussed the differences between panniculectomy and abdominoplasty. The risk of both procedures were discussed with patient in detail. All questions were answered. If you have questions, please do not hesitate to call me. I look forward to following Florencio Farley along with you.     Sincerely,        Otho Frankel, MD

## 2021-01-27 NOTE — PROGRESS NOTES
PANNICULECTOMY HEALTH CHECKLIST:  Height: Height: 4' 10\" (147.3 cm)   Weight: Weight: 169 lb 3.2 oz (76.7 kg)    BMI: Body mass index is 35.36 kg/m². Does your pannus affect your daily activities and quality of life? Yes          How lon2020   Which daily living activities are affected in the following ways? Cleaning of feet - yes   Odor - yes   Interferes with exercise - yes   [place] - yes   Painful pulling of abdominal skin - yes   Clothes not fitting - yes           Urination - yes   Pulling of pubic hair - no   Seat belt not fitting - yes   Other:      Back pain? Yes              Any Treatment? No        Any Testing? No   Where/What:     Have you had gastric bypass? Yes              Initial weight: 296 lb        Weight stable for how long? 9+ months   Do you have a hernia? No        Testing: No     Date/location:   Have you had a hernia repair in the past?       No        Was mesh used? No        Surgeon for hernia:    Any Rashes/Ulcers under folds of skin?   Yes              Medications used:                                                        OTC Baby Powder, diaper cream                                                                                               RX Nystatin Powder         Notes:

## 2021-01-28 ENCOUNTER — TELEPHONE (OUTPATIENT)
Dept: INTERNAL MEDICINE | Age: 41
End: 2021-01-28

## 2021-01-28 ENCOUNTER — HOSPITAL ENCOUNTER (OUTPATIENT)
Dept: LAB | Age: 41
Setting detail: SPECIMEN
Discharge: HOME | End: 2021-01-28
Payer: MEDICARE

## 2021-01-28 DIAGNOSIS — Z01.818 PREOP TESTING: Primary | ICD-10-CM

## 2021-01-28 NOTE — TELEPHONE ENCOUNTER
Writer call pt trying to schedule a appt for her medical clearance     Put original form in the next day basket

## 2021-02-10 DIAGNOSIS — R21 RASH: Primary | ICD-10-CM

## 2021-02-10 RX ORDER — NYSTATIN 100000 U/G
CREAM TOPICAL
Qty: 30 G | Refills: 0 | Status: ON HOLD | OUTPATIENT
Start: 2021-02-10 | End: 2021-04-01 | Stop reason: HOSPADM

## 2021-02-11 DIAGNOSIS — M62.08 DIASTASIS OF RECTUS ABDOMINIS: Primary | ICD-10-CM

## 2021-02-12 ENCOUNTER — HOSPITAL ENCOUNTER (OUTPATIENT)
Dept: CT IMAGING | Age: 41
Discharge: HOME | End: 2021-02-14
Payer: MEDICARE

## 2021-02-12 DIAGNOSIS — Z98.84 STATUS POST GASTRIC BYPASS FOR OBESITY: ICD-10-CM

## 2021-02-12 DIAGNOSIS — M62.08 DIASTASIS OF RECTUS ABDOMINIS: ICD-10-CM

## 2021-02-12 DIAGNOSIS — E55.9 VITAMIN D DEFICIENCY: Primary | ICD-10-CM

## 2021-02-12 DIAGNOSIS — E66.9 OBESITY (BMI 30-39.9): ICD-10-CM

## 2021-02-12 PROCEDURE — 74177 CT ABD & PELVIS W/CONTRAST: CPT

## 2021-02-12 PROCEDURE — 82565 ASSAY OF CREATININE: CPT

## 2021-02-12 PROCEDURE — 6360000004 HC RX CONTRAST MEDICATION: Performed by: PSYCHIATRY & NEUROLOGY

## 2021-02-12 RX ADMIN — IOHEXOL 50 ML: 240 INJECTION, SOLUTION INTRATHECAL; INTRAVASCULAR; INTRAVENOUS; ORAL at 07:54

## 2021-02-12 RX ADMIN — IOPAMIDOL 75 ML: 755 INJECTION, SOLUTION INTRAVENOUS at 07:53

## 2021-02-23 DIAGNOSIS — K42.9 UMBILICAL HERNIA WITHOUT OBSTRUCTION AND WITHOUT GANGRENE: ICD-10-CM

## 2021-02-23 DIAGNOSIS — M62.08 DIASTASIS OF RECTUS ABDOMINIS: Primary | ICD-10-CM

## 2021-03-01 ENCOUNTER — OFFICE VISIT (OUTPATIENT)
Dept: SURGERY | Age: 41
End: 2021-03-01
Payer: MEDICARE

## 2021-03-01 VITALS
SYSTOLIC BLOOD PRESSURE: 129 MMHG | HEIGHT: 57 IN | WEIGHT: 165.6 LBS | BODY MASS INDEX: 35.72 KG/M2 | OXYGEN SATURATION: 99 % | DIASTOLIC BLOOD PRESSURE: 87 MMHG | HEART RATE: 54 BPM

## 2021-03-01 DIAGNOSIS — K42.9 UMBILICAL HERNIA WITHOUT OBSTRUCTION AND WITHOUT GANGRENE: Primary | ICD-10-CM

## 2021-03-01 PROCEDURE — 1036F TOBACCO NON-USER: CPT | Performed by: SURGERY

## 2021-03-01 PROCEDURE — G8484 FLU IMMUNIZE NO ADMIN: HCPCS | Performed by: SURGERY

## 2021-03-01 PROCEDURE — 99213 OFFICE O/P EST LOW 20 MIN: CPT | Performed by: SURGERY

## 2021-03-01 PROCEDURE — G8417 CALC BMI ABV UP PARAM F/U: HCPCS | Performed by: SURGERY

## 2021-03-01 PROCEDURE — G8427 DOCREV CUR MEDS BY ELIG CLIN: HCPCS | Performed by: SURGERY

## 2021-03-01 NOTE — PROGRESS NOTES
[id number] [address] [facility]   History & Physical  Alon Young DO  Pt Name: Augie Radford  MRN: [id number]  YOB: 1980  Date of evaluation: 3/1/2021  Primary Care Physician: Tolu Beaver MD    Chief Complaint: umbilical hernia      SUBJECTIVE:    History of Present Illness: This is a 45 y.o.  female who presents for evaluation for umbilical hernia repair, she is scheduled to undergo abdominoplasty/panniculectomy with Dr. Del Mendoza for complications related to excess skin/soft tissue secondary to weight loss from RNY gastric bypass. Chart review performed to add information to the HPI: Yes    Past Medical History   has a past medical history of Gastric reflux, History of anxiety, Hypertension, Migraine, Neuropathy, Obesities, morbid ([address] TINO (obstructive sleep apnea), Suicide attempt ([address] and Wears glasses. Past Surgical History   has a past surgical history that includes Tubal ligation; Upper gastrointestinal endoscopy (N/A, 12/7/2018); [place] tooth extraction; Sukhjinder-en-Y Gastric Bypass (04/01/2019); Sukjhinder-en-Y Gastric Bypass (N/A, 4/1/2019); Upper gastrointestinal endoscopy (N/A, 6/14/2019); Abdomen surgery; Cholecystectomy, laparoscopic (10/07/2019); and Cholecystectomy, laparoscopic (N/A, 10/7/2019). Family History  family history includes Alcohol Abuse in her father; Cancer in her father and maternal grandfather; Depression in her father; Diabetes in her father, mother, paternal grandfather, and paternal grandmother; Heart Disease in her father, mother, paternal grandfather, and paternal uncle; High Blood Pressure in her father, mother, paternal grandfather, paternal grandmother, and sister; Kidney Disease in her paternal aunt; Liver Disease in her father; Migraines in her maternal grandmother and mother; Neuropathy in her sister; Other in her paternal grandmother; Stroke in her maternal grandfather; Thyroid Disease in her father, mother, and sister.     Social History prazosin (MINIPRESS) 1 MG capsule Take 1 capsule by mouth nightly 11/20/20  Yes MIKO Caldwell NP   promethazine (PHENERGAN) 25 MG tablet Take 1 tablet by mouth every 8 hours as needed for Nausea 9/21/20  Yes MIKO Kelley CNP   Multiple Vitamins-Minerals (CELEBRATE MULTI-COMPLETE 36) CHEW Take 1 tablet by mouth 2 times daily 5/15/20  Yes Linda Roberson MD   calcium carbonate (TUMS) 500 MG chewable tablet Take 1 tablet by mouth 2 times daily 5/15/20  Yes Linda Roberson MD   PARoxetine (PAXIL) 10 MG tablet Take 0.5 tablets by mouth every morning for 7 days, THEN 1 tablet every morning for 23 days. 11/20/20 1/27/21  MIKO Caldwell NP       Allergies  Latex      Review of Systems:  General: Denies any fever, chills. Eyes: Denies any changes in vision, diplopia or eye pain  Ears, Nose, Mouth: Denies changes in hearing/tinnitus or drainage from ears, no rhinorrhea or bloody nose, no difficulty chewing  Throat: no difficulty swallowing, no throat pain  Respiratory: Denies any shortness of breath or cough. Cardiac: Denies any chest pain, palpitations, claudication or edema. Gastrointestinal: Denies any melena, hematochezia, hematemesis or pyrosis. Genitourinary: Denies any frequency, urgency, hesitancy or incontinence. Musculoskeletal: Denies worsening muscle weakness or recent trauma  Skin: Denies rashes or lesions  Psychiatric: Denies any recent changes in mood or affect  Hematologic: Denies bruising or bleeding easily. PHYSICAL EXAMINATION  Vitals:   Vitals:    03/01/21 1148   BP: 129/87   Pulse:    SpO2:        General Appearance:  awake, alert, no acute distress, well developed, well nourished   Skin:  Skin color, texture, turgor normal. No rashes or lesions. Head/face:  NCAT, face symmetrical  Eyes:  PERRL, no evidence of conjunctivitis or ptosis bilaterally  Ears:  External ears and canals grossly normal, no evidence of otorrhea. Nose/Sinuses:  Nares normal. Septum midline. Mucosa normal. No external drainage noted. Mouth/Neck:  Mucosa moist.  No external oral lesions. Trachea midline. No visible masses. Lungs:  Normal chest expansion, unlabored breathing without accessory muscle use. No audible rales, rhonchi, or wheezing. Cardiovascular: S1S2. No evidence of JVD. No evidence of pulsatile masses in abdomen  Abdomen:  Soft, non-tender, no organomegaly, no masses. Musculoskeletal: No evidence of bony/muscular deformities, trauma, atrophy of either left/right upper/lower extremity. No evidence of digital clubbing or cyanosis. Neurologic:  CN 2-12 grossly intact without obvious deficits. Grossly normal sensation in all extremities. Psychiatric: appropriate judgement and insight, appropriate recall of recent and remote memory, no evidence of depression/anxiety/agitation    LABS:  CBC: No results for input(s): WBC, HGB, PLT in the last 72 hours. BMP:  No results for input(s): NA, K, CL, CO2, BUN, CREATININE, GLUCOSE in the last 72 hours. Hepatic: No results for input(s): AST, ALT, ALB, ALKPHOS, BILITOT, BILIDIR in the last 72 hours. Coagulation: No results for input(s): APTT, PROT, INR in the last 72 hours.     RADIOLOGY:  The following images and reports were personally reviewed with the following significant findings pertinent to the Chief Complaint and/or HPI:    Ct Abdomen Pelvis W Iv Contrast Additional Contrast? Oral    Result Date: 2/12/2021 EXAMINATION: CT OF THE ABDOMEN AND PELVIS WITH CONTRAST 2/12/2021 6:50 am TECHNIQUE: CT of the abdomen and pelvis was performed with the administration of intravenous contrast. Multiplanar reformatted images are provided for review. Dose modulation, iterative reconstruction, and/or weight based adjustment of the mA/kV was utilized to reduce the radiation dose to as low as reasonably achievable. COMPARISON: None. HISTORY: ORDERING SYSTEM PROVIDED HISTORY: Diastasis of rectus abdominis TECHNOLOGIST PROVIDED HISTORY: FINDINGS: Lower Chest: The visualized heart and lungs show no acute abnormalities. Organs: 3.2 cm cyst in the left lobe of the liver. In the posteroinferior right lobe of liver a 4 mm hypodensity is too small to characterize but probably a cyst.  The spleen is normal in size. Pancreas, adrenal glands and kidneys show no significant abnormalities. Cholecystectomy noted. GI/Bowel: Sukhjinder-en-Y gastric bypass surgery noted. Stomach is collapsed. Small large bowel loops normal in caliber showing no significant abnormalities. No evidence for appendicitis. Pelvis: Phleboliths. Pelvic organs unremarkable. 2 cm right adnexal cyst requires no follow-up. Peritoneum/Retroperitoneum: No free intraperitoneal fluid or significant lymphadenopathy. Bones/Soft Tissues: There is diastasis of the rectus muscles which status from about 10.5 cm above level of umbilicus and extends through the level of umbilicus terminating about 4.5 cm below level of umbilicus. Rectus muscles are  by up to 3 cm. There is a tiny fat containing umbilical hernia. No acute bony abnormality. 1. A 3.2 cm left lobe of liver cyst and probable 4 mm right lobe of liver cyst. 2. Diastasis of the rectus muscles about 15 cm in craniocaudal length extending from mid epigastric region to about 4.5 cm below level of umbilicus. The rectus muscles are  by up to about 3 cm. 3. A tiny fat containing umbilical hernia. DIAGNOSES:   Diagnosis Orders   1. Umbilical hernia without obstruction and without gangrene             PLAN:  Plan for open umbilical hernia repair without mesh given its size, this can be repair during pt's abdominoplasty/panniculectomy. The risks include bleeding, infection, scarring, nerve pain, damage to surrounding tissues, recurrence of hernia, need for further surgery in the future. The benefits, alternatives, complications and procedure details were explained to the patient, all questions were answered.   ·   ·       Medical Decision Making: low complexity     Electronically signed by Jeronimo Obregon DO on 3/1/2021 at 1:54 PM

## 2021-03-02 NOTE — PROGRESS NOTES
3/4/2021    TELEHEALTH EVALUATION -- Audio/Visual (During JCOPH-58 public health emergency)    Patient and physician are located in their individual locations. This is visit is completed via [employer] application [x]/ Doxy. me[] / Telephone []     HPI:    Hector Oreilly (:  1980) has requested an audio/video evaluation for the following concern(s):    Follow-up for TINO and morbid obesity status post bariatric surgery. Patient was last seen in the office in 2020 as a telehealth visit per me. At that visit a split-night sleep study was ordered as patient had lost her CPAP machine and has had a significant weight loss from her prior sleep study. Patient was previously recommended for sleep study however she did not pursue as she felt that her snoring was related to her pillows and has resolved after changing her pillows. Patient need surgical clearance for a scheduled panniculectomy, abdominoplasty and abdominal hernia repair. This is scheduled on 2021. Patient is cleared with minimal risk for the surgery, she is postoperatively she may need CPAP/BiPAP until she is fully recovered from anesthesia. Patient denies any shortness of breath, cough, wheezing. She is not planning on obtaining Covid vaccine. Discussed however patient not persuaded. Medications:   No pulmonary medications. Prior Work Up:  PFT 3/11/2019: FVC 3.16 (113% of predicted), FEV1 2.75 (113% of predicted), FEV1/FVC ratio 87 (100% of predicted), FEF 2575 3.5 (118% of predicted). Lung volumes by plethysmography RV 0.99 (83% of predicted), TLC 4.13 (104% of predicted), DSB 18.21 (91% of predicted).   Final impression: Normal pulmonary function test.     Sleep study: CPAP titration study 1/12/2019: Patient was treated with CPAP throughout the night. Patient's respiratory events ultimately resolved with a pressure of 10 cm H2O. At this pressure there was 1 residual hypopneas for an apnea-hypopnea index of 0.6 events per hour sleep and minimum oxygen saturation 95%.     She had a home sleep study/apnea link screening completed 10/28/2018: She had an apnea-hypopnea index of 8 events per hour. With an SPO2 lowest recorded desaturation was 85%.         Review of Systems   Constitutional: Negative for fatigue and fever. HENT: Negative for postnasal drip, rhinorrhea, sinus pressure and sinus pain. Respiratory: Negative for cough, chest tightness, shortness of breath, wheezing and stridor. Cardiovascular: Negative for chest pain and leg swelling. Gastrointestinal: Negative for constipation, diarrhea, nausea and vomiting. Genitourinary: Negative for dysuria, frequency and urgency. Musculoskeletal: Negative for arthralgias, joint swelling and myalgias. Skin: Negative for rash. Neurological: Negative for dizziness, speech difficulty and headaches. Hematological: Does not bruise/bleed easily. Psychiatric/Behavioral: Negative for agitation, hallucinations, sleep disturbance and suicidal ideas. Prior to Visit Medications    Medication Sig Taking? Authorizing Provider   nystatin (MYCOSTATIN) 600186 UNIT/GM cream Apply topically 2 times daily.  Yes Tracie Jones MD   pantoprazole (PROTONIX) 40 MG tablet Take 1 tablet by mouth daily Yes Elias Bradley DO   docusate sodium (COLACE) 100 MG capsule Take 1 capsule by mouth 2 times daily as needed for Constipation Yes Elias Bradley DO   traZODone (DESYREL) 50 MG tablet Take 0.5 tablets by mouth nightly as needed for Sleep Yes MIKO Weber NP   prazosin (MINIPRESS) 1 MG capsule Take 1 capsule by mouth nightly Yes MIKO Weber NP promethazine (PHENERGAN) 25 MG tablet Take 1 tablet by mouth every 8 hours as needed for Nausea Yes German Keys APRN - CNP   Multiple Vitamins-Minerals (CELEBRATE MULTI-COMPLETE 36) CHEW Take 1 tablet by mouth 2 times daily Yes Emerson Rogers MD   calcium carbonate (TUMS) 500 MG chewable tablet Take 1 tablet by mouth 2 times daily Yes Emerson Rogers MD   PARoxetine (PAXIL) 10 MG tablet Take 0.5 tablets by mouth every morning for 7 days, THEN 1 tablet every morning for 23 days. Berenice Crigler, APRN - NP       Social History     Tobacco Use    Smoking status: Passive Smoke Exposure - Never Smoker    Smokeless tobacco: Never Used   Substance Use Topics    Alcohol use: Yes     Comment: social     Drug use: Not Currently     Comment: drug use history as a teen              RECORD REVIEW: Previous medical records were reviewed at today's visit.     Wt Readings from Last 3 Encounters:   03/01/21 165 lb 9.6 oz (75.1 kg)   01/27/21 169 lb 3.2 oz (76.7 kg)   11/23/20 167 lb (75.8 kg)       Results for orders placed or performed during the hospital encounter of 09/11/20   Comprehensive Metabolic Panel   Result Value Ref Range    Glucose 83 70 - 99 mg/dL    BUN 10 6 - 20 mg/dL    CREATININE 0.55 0.50 - 0.90 mg/dL    Bun/Cre Ratio NOT REPORTED 9 - 20    Calcium 8.4 (L) 8.6 - 10.4 mg/dL    Sodium 140 135 - 144 mmol/L    Potassium 4.7 3.7 - 5.3 mmol/L    Chloride 107 98 - 107 mmol/L    CO2 24 20 - 31 mmol/L    Anion Gap 9 9 - 17 mmol/L    Alkaline Phosphatase 85 35 - 104 U/L    ALT 20 5 - 33 U/L    AST 17 <32 U/L    Total Bilirubin 0.54 0.3 - 1.2 mg/dL    Total Protein 6.4 6.4 - 8.3 g/dL    Albumin 3.9 3.5 - 5.2 g/dL    Albumin/Globulin Ratio 1.6 1.0 - 2.5    GFR Non-African American >60 >60 mL/min    GFR African American >60 >60 mL/min    GFR Comment          GFR Staging NOT REPORTED    TSH without Reflex   Result Value Ref Range    TSH 1.11 0.30 - 5.00 mIU/L   Hemoglobin A1C   Result Value Ref Range Hemoglobin A1C 4.8 4.0 - 6.0 %    Estimated Avg Glucose 91 mg/dL   Vitamin B12 & Folate   Result Value Ref Range    Vitamin B-12 388 232 - 1245 pg/mL    Folate 17.0 >4.8 ng/mL   Vitamin B1   Result Value Ref Range    Vitamin B1,Whole Blood 172 70 - 180 nmol/L   Vitamin D 25 Hydroxy   Result Value Ref Range    Vit D, 25-Hydroxy 15.6 (L) 30.0 - 100.0 ng/mL   Magnesium   Result Value Ref Range    Magnesium 2.0 1.6 - 2.6 mg/dL   Iron and TIBC   Result Value Ref Range    Iron 55 37 - 145 ug/dL    TIBC 296 250 - 450 ug/dL    Iron Saturation 19 (L) 20 - 55 %    UIBC 241 112 - 347 ug/dL   Vitamin A   Result Value Ref Range    Vitamin A 0.41 0.30 - 1.20 mg/L    RETINYL PALMITATE <0.02 0.00 - 0.10 mg/L    Vitamin A, Interp Normal    Lipid Panel   Result Value Ref Range    Cholesterol 120 <200 mg/dL    HDL 42 >40 mg/dL    LDL Cholesterol 55 0 - 130 mg/dL    Chol/HDL Ratio 2.9 <5    Triglycerides 114 <150 mg/dL    VLDL NOT REPORTED 1 - 30 mg/dL   PTH, Intact   Result Value Ref Range    Pth Intact 108.7 (H) 15.0 - 65.0 pg/mL   CBC Auto Differential   Result Value Ref Range    WBC 4.8 3.5 - 11.3 k/uL    RBC 4.59 3.95 - 5.11 m/uL    Hemoglobin 14.4 11.9 - 15.1 g/dL    Hematocrit 44.2 36.3 - 47.1 %    MCV 96.3 82.6 - 102.9 fL    MCH 31.4 25.2 - 33.5 pg    MCHC 32.6 28.4 - 34.8 g/dL    RDW 13.0 11.8 - 14.4 %    Platelets 854 345 - 687 k/uL    MPV 11.5 8.1 - 13.5 fL    NRBC Automated 0.0 0.0 per 100 WBC    Differential Type NOT REPORTED     Seg Neutrophils 55 36 - 65 %    Lymphocytes 36 24 - 43 %    Monocytes 5 3 - 12 %    Eosinophils % 3 1 - 4 %    Basophils 1 0 - 2 %    Immature Granulocytes 0 0 %    Segs Absolute 2.63 1.50 - 8.10 k/uL    Absolute Lymph # 1.76 1.10 - 3.70 k/uL    Absolute Mono # 0.24 0.10 - 1.20 k/uL    Absolute Eos # 0.15 0.00 - 0.44 k/uL    Basophils Absolute 0.05 0.00 - 0.20 k/uL    Absolute Immature Granulocyte <0.03 0.00 - 0.30 k/uL    WBC Morphology NOT REPORTED     RBC Morphology NOT REPORTED Platelet Estimate NOT REPORTED        Ct Abdomen Pelvis W Iv Contrast Additional Contrast? Oral    Result Date: 2/12/2021  EXAMINATION: CT OF THE ABDOMEN AND PELVIS WITH CONTRAST 2/12/2021 6:50 am TECHNIQUE: CT of the abdomen and pelvis was performed with the administration of intravenous contrast. Multiplanar reformatted images are provided for review. Dose modulation, iterative reconstruction, and/or weight based adjustment of the mA/kV was utilized to reduce the radiation dose to as low as reasonably achievable. COMPARISON: None. HISTORY: ORDERING SYSTEM PROVIDED HISTORY: Diastasis of rectus abdominis TECHNOLOGIST PROVIDED HISTORY: FINDINGS: Lower Chest: The visualized heart and lungs show no acute abnormalities. Organs: 3.2 cm cyst in the left lobe of the liver. In the posteroinferior right lobe of liver a 4 mm hypodensity is too small to characterize but probably a cyst.  The spleen is normal in size. Pancreas, adrenal glands and kidneys show no significant abnormalities. Cholecystectomy noted. GI/Bowel: Sukhjinder-en-Y gastric bypass surgery noted. Stomach is collapsed. Small large bowel loops normal in caliber showing no significant abnormalities. No evidence for appendicitis. Pelvis: Phleboliths. Pelvic organs unremarkable. 2 cm right adnexal cyst requires no follow-up. Peritoneum/Retroperitoneum: No free intraperitoneal fluid or significant lymphadenopathy. Bones/Soft Tissues: There is diastasis of the rectus muscles which status from about 10.5 cm above level of umbilicus and extends through the level of umbilicus terminating about 4.5 cm below level of umbilicus. Rectus muscles are  by up to 3 cm. There is a tiny fat containing umbilical hernia. No acute bony abnormality. 1. A 3.2 cm left lobe of liver cyst and probable 4 mm right lobe of liver cyst. 2. Diastasis of the rectus muscles about 15 cm in craniocaudal length extending from mid epigastric region to about 4.5 cm below level of umbilicus. The rectus muscles are  by up to about 3 cm. 3. A tiny fat containing umbilical hernia. PHYSICAL EXAMINATION:  Due to this being a TeleHealth encounter, evaluation of the following organ systems is limited: Vitals/Constitutional/EENT/Resp/CV/GI//MS/Neuro/Skin/Heme-Lymph-Imm. Constitutional: [x] Appears well-developed and well-nourished. [] Abnormal  Mental status  [x] Alert and awake  [x] Oriented to person/place/time [x]Able to follow commands    [x] No apparent distress      Eyes:  EOM    [x]  Normal  [] Abnormal-  Sclera  [x]  Normal  [] Abnormal -         Discharge [x]  None visible  [] Abnormal -    HENT:   [x] Normocephalic, atraumatic. [] Abnormal shaped head   [x] Mouth/Throat: Mucous membranes are moist.     Ears [x] Normal  [] Abnormal-    Neck: [x] Normal range of motion [x] Supple [x] No visualized mass. Pulmonary/Chest: [x] Respiratory effort normal.  [x] No visualized signs of difficulty breathing or respiratory distress        [] Abnormal      Musculoskeletal:   [x] Normal range of motion. [x] Normal gait with no signs of ataxia. [x]  No signs of cyanosis of the peripheral portions of extremities. [] Abnormal       Neurological:        [x] Normal cranial nerve (limited exam to video visit) [x] No focal weakness observed       [] Abnormal          Speech       [x] Normal   [] Abnormal     Skin:        [x] No rash on visible skin  [x] Normal  [] Abnormal     Psychiatric:       [x] Normal  [] Abnormal        [x] Normal Mood  [] Anxious appearing      Other pertinent exam findings:-        ASSESSMENT:  1. Status post gastric bypass for obesity    2. TINO (obstructive sleep apnea)    3.  COVID-19 virus vaccination refused          Plan: 1. Maintain an active lifestyle. 2. Patient's questions were answered to her satisfaction. 3. Pulmonary function tests were reviewed   4. Patient refusing sleep study. 5. We'll see the patient back in 12 months         An  electronic signature was used to authenticate this note. --Ivan Marcelino, MIKO - CNP on 3/4/2021 at 12:58 PM    9}    Pursuant to the emergency declaration under the [address] [place] [address] [facility] authority and the [employer] and [employer] Act, this Virtual  Visit was conducted, with patient's consent, to reduce the patient's risk of exposure to COVID-19 and provide continuity of care for an established patient.     Services were provided through a video synchronous discussion virtually to substitute for in-person clinic visit.     _______________________________________________________________________________________________________________________________________________

## 2021-03-04 ENCOUNTER — TELEMEDICINE (OUTPATIENT)
Dept: PULMONOLOGY | Age: 41
End: 2021-03-04
Payer: MEDICARE

## 2021-03-04 DIAGNOSIS — Z98.84 STATUS POST GASTRIC BYPASS FOR OBESITY: Primary | ICD-10-CM

## 2021-03-04 DIAGNOSIS — G47.33 OSA (OBSTRUCTIVE SLEEP APNEA): ICD-10-CM

## 2021-03-04 DIAGNOSIS — Z28.21 COVID-19 VIRUS VACCINATION REFUSED: ICD-10-CM

## 2021-03-04 PROCEDURE — 99213 OFFICE O/P EST LOW 20 MIN: CPT | Performed by: NURSE PRACTITIONER

## 2021-03-04 PROCEDURE — G8427 DOCREV CUR MEDS BY ELIG CLIN: HCPCS | Performed by: NURSE PRACTITIONER

## 2021-03-04 ASSESSMENT — ENCOUNTER SYMPTOMS
VOMITING: 0
SINUS PAIN: 0
WHEEZING: 0
DIARRHEA: 0
CONSTIPATION: 0
RHINORRHEA: 0
SINUS PRESSURE: 0
COUGH: 0
SHORTNESS OF BREATH: 0
STRIDOR: 0
CHEST TIGHTNESS: 0
NAUSEA: 0

## 2021-03-04 ASSESSMENT — SLEEP AND FATIGUE QUESTIONNAIRES
HOW LIKELY ARE YOU TO NOD OFF OR FALL ASLEEP WHILE SITTING QUIETLY AFTER LUNCH WITHOUT ALCOHOL: 0
ESS TOTAL SCORE: 0
HOW LIKELY ARE YOU TO NOD OFF OR FALL ASLEEP WHILE SITTING AND TALKING TO SOMEONE: 0
HOW LIKELY ARE YOU TO NOD OFF OR FALL ASLEEP WHILE SITTING AND READING: 0
HOW LIKELY ARE YOU TO NOD OFF OR FALL ASLEEP IN A CAR, WHILE STOPPED FOR A FEW MINUTES IN TRAFFIC: 0
HOW LIKELY ARE YOU TO NOD OFF OR FALL ASLEEP WHILE WATCHING TV: 0
HOW LIKELY ARE YOU TO NOD OFF OR FALL ASLEEP WHEN YOU ARE A PASSENGER IN A CAR FOR AN HOUR WITHOUT A BREAK: 0
HOW LIKELY ARE YOU TO NOD OFF OR FALL ASLEEP WHILE LYING DOWN TO REST IN THE AFTERNOON WHEN CIRCUMSTANCES PERMIT: 0

## 2021-03-04 NOTE — LETTER
[facility], [address] [place] [id number]  Phone: [phone]  Fax: MIKO Edwadr CNP        March 4, 2021     Patient: Hector Oreilly   YOB: 1980   Date of Visit: 3/4/2021       To Whom it May Concern:    Ricki Pichardo was seen in my clinic on 3/4/2021 as a telehealth visit ([address]). She is scheduled for abdominoplasty, panniculectomy and abdominal hernia repair on 4/1/2021. She is asking for surgical clearance to be sent to her surgeons. She has a past medical history of morbid obesity status post bariatric surgery, TINO no longer on CPAP. She are presently asymptomatic from a pulmonary standpoint and as long as he/she is fully aware of the risk  and benefits of his upcoming procedure as described by you, I see no reason why he/she cannot proceed at this time, and poses minimal risk from a pulmonary perspective. Postoperatively patient may benefit from CPAP/BiPAP until fully recovered from anesthesia. It is my hope that this information will be of help to you and our mutual patient and should you have further questions regarding this matter, please do not hesitate to contact the office.     Sincerely yours,        Sofya REYES  [facility]  [address], [address], [address]  (office number) [phone]  (fax number) [phone]

## 2021-03-09 ENCOUNTER — OFFICE VISIT (OUTPATIENT)
Dept: INTERNAL MEDICINE | Age: 41
End: 2021-03-09
Payer: MEDICARE

## 2021-03-09 ENCOUNTER — HOSPITAL ENCOUNTER (OUTPATIENT)
Age: 41
Setting detail: SPECIMEN
Discharge: HOME | End: 2021-03-09
Payer: MEDICARE

## 2021-03-09 VITALS
BODY MASS INDEX: 36.03 KG/M2 | DIASTOLIC BLOOD PRESSURE: 64 MMHG | WEIGHT: 167 LBS | HEART RATE: 60 BPM | HEIGHT: 57 IN | SYSTOLIC BLOOD PRESSURE: 111 MMHG

## 2021-03-09 DIAGNOSIS — G25.81 RESTLESS LEG SYNDROME: ICD-10-CM

## 2021-03-09 DIAGNOSIS — E55.9 VITAMIN D DEFICIENCY: ICD-10-CM

## 2021-03-09 DIAGNOSIS — Z98.84 STATUS POST GASTRIC BYPASS FOR OBESITY: ICD-10-CM

## 2021-03-09 DIAGNOSIS — K29.50 ANTRAL GASTRITIS: ICD-10-CM

## 2021-03-09 DIAGNOSIS — E60 LOW ZINC LEVEL: ICD-10-CM

## 2021-03-09 DIAGNOSIS — N25.81 SECONDARY HYPERPARATHYROIDISM (HCC): Primary | ICD-10-CM

## 2021-03-09 DIAGNOSIS — L24.0 CONTACT DERMATITIS AND ECZEMA DUE TO DETERGENTS: ICD-10-CM

## 2021-03-09 DIAGNOSIS — G62.9 NEUROPATHY: ICD-10-CM

## 2021-03-09 DIAGNOSIS — Z01.818 PRE-OP EVALUATION: ICD-10-CM

## 2021-03-09 DIAGNOSIS — K21.9 GASTROESOPHAGEAL REFLUX DISEASE WITHOUT ESOPHAGITIS: ICD-10-CM

## 2021-03-09 DIAGNOSIS — N25.81 SECONDARY HYPERPARATHYROIDISM (HCC): ICD-10-CM

## 2021-03-09 DIAGNOSIS — E66.9 OBESITY (BMI 30-39.9): ICD-10-CM

## 2021-03-09 DIAGNOSIS — L98.7 EXCESS SKIN OF ABDOMEN: ICD-10-CM

## 2021-03-09 DIAGNOSIS — E53.8 FOLATE DEFICIENCY: ICD-10-CM

## 2021-03-09 DIAGNOSIS — K59.01 SLOW TRANSIT CONSTIPATION: ICD-10-CM

## 2021-03-09 DIAGNOSIS — G47.33 OSA (OBSTRUCTIVE SLEEP APNEA): ICD-10-CM

## 2021-03-09 DIAGNOSIS — F31.81 BIPOLAR II DISORDER, MOST RECENT EPISODE DEPRESSED WITH RAPID CYCLING (HCC): ICD-10-CM

## 2021-03-09 DIAGNOSIS — E55.9 VITAMIN D DEFICIENCY: Primary | ICD-10-CM

## 2021-03-09 LAB
ABSOLUTE EOS #: 0.18 K/UL (ref 0–0.44)
ABSOLUTE EOS #: 0.18 K/UL (ref 0–0.44)
ABSOLUTE IMMATURE GRANULOCYTE: <0.03 K/UL (ref 0–0.3)
ABSOLUTE IMMATURE GRANULOCYTE: <0.03 K/UL (ref 0–0.3)
ABSOLUTE LYMPH #: 1.85 K/UL (ref 1.1–3.7)
ABSOLUTE LYMPH #: 1.85 K/UL (ref 1.1–3.7)
ABSOLUTE MONO #: 0.28 K/UL (ref 0.1–1.2)
ABSOLUTE MONO #: 0.28 K/UL (ref 0.1–1.2)
ALBUMIN SERPL-MCNC: 4.1 G/DL (ref 3.5–5.2)
ALBUMIN SERPL-MCNC: 4.1 G/DL (ref 3.5–5.2)
ALBUMIN/GLOBULIN RATIO: 1.6 (ref 1–2.5)
ALBUMIN/GLOBULIN RATIO: 1.6 (ref 1–2.5)
ALP BLD-CCNC: 80 U/L (ref 35–104)
ALP BLD-CCNC: 80 U/L (ref 35–104)
ALT SERPL-CCNC: 27 U/L (ref 5–33)
ALT SERPL-CCNC: 27 U/L (ref 5–33)
ANION GAP SERPL CALCULATED.3IONS-SCNC: 9 MMOL/L (ref 9–17)
ANION GAP SERPL CALCULATED.3IONS-SCNC: 9 MMOL/L (ref 9–17)
AST SERPL-CCNC: 25 U/L
AST SERPL-CCNC: 25 U/L
BASOPHILS # BLD: 1 % (ref 0–2)
BASOPHILS # BLD: 1 % (ref 0–2)
BASOPHILS ABSOLUTE: 0.04 K/UL (ref 0–0.2)
BASOPHILS ABSOLUTE: 0.04 K/UL (ref 0–0.2)
BILIRUB SERPL-MCNC: 0.77 MG/DL (ref 0.3–1.2)
BILIRUB SERPL-MCNC: 0.77 MG/DL (ref 0.3–1.2)
BUN BLDV-MCNC: 11 MG/DL (ref 6–20)
BUN BLDV-MCNC: 11 MG/DL (ref 6–20)
BUN/CREAT BLD: ABNORMAL (ref 9–20)
BUN/CREAT BLD: ABNORMAL (ref 9–20)
CALCIUM IONIZED: 1.23 MMOL/L (ref 1.13–1.33)
CALCIUM SERPL-MCNC: 9 MG/DL (ref 8.6–10.4)
CALCIUM SERPL-MCNC: 9 MG/DL (ref 8.6–10.4)
CHLORIDE BLD-SCNC: 102 MMOL/L (ref 98–107)
CHLORIDE BLD-SCNC: 102 MMOL/L (ref 98–107)
CO2: 25 MMOL/L (ref 20–31)
CO2: 25 MMOL/L (ref 20–31)
CREAT SERPL-MCNC: 0.41 MG/DL (ref 0.5–0.9)
CREAT SERPL-MCNC: 0.41 MG/DL (ref 0.5–0.9)
DIFFERENTIAL TYPE: NORMAL
DIFFERENTIAL TYPE: NORMAL
EOSINOPHILS RELATIVE PERCENT: 4 % (ref 1–4)
EOSINOPHILS RELATIVE PERCENT: 4 % (ref 1–4)
ESTIMATED AVERAGE GLUCOSE: 91 MG/DL
FERRITIN: 18 UG/L (ref 13–150)
GFR AFRICAN AMERICAN: >60 ML/MIN
GFR AFRICAN AMERICAN: >60 ML/MIN
GFR NON-AFRICAN AMERICAN: >60 ML/MIN
GFR NON-AFRICAN AMERICAN: >60 ML/MIN
GFR SERPL CREATININE-BSD FRML MDRD: ABNORMAL ML/MIN/{1.73_M2}
GLUCOSE BLD-MCNC: 103 MG/DL (ref 70–99)
GLUCOSE BLD-MCNC: 103 MG/DL (ref 70–99)
HBA1C MFR BLD: 4.8 % (ref 4–6)
HCT VFR BLD CALC: 41.4 % (ref 36.3–47.1)
HCT VFR BLD CALC: 41.4 % (ref 36.3–47.1)
HEMOGLOBIN: 13.5 G/DL (ref 11.9–15.1)
HEMOGLOBIN: 13.5 G/DL (ref 11.9–15.1)
IMMATURE GRANULOCYTES: 0 %
IMMATURE GRANULOCYTES: 0 %
IRON SATURATION: 27 % (ref 20–55)
IRON: 92 UG/DL (ref 37–145)
LYMPHOCYTES # BLD: 38 % (ref 24–43)
LYMPHOCYTES # BLD: 38 % (ref 24–43)
MAGNESIUM: 1.8 MG/DL (ref 1.6–2.6)
MCH RBC QN AUTO: 31.3 PG (ref 25.2–33.5)
MCH RBC QN AUTO: 31.3 PG (ref 25.2–33.5)
MCHC RBC AUTO-ENTMCNC: 32.6 G/DL (ref 28.4–34.8)
MCHC RBC AUTO-ENTMCNC: 32.6 G/DL (ref 28.4–34.8)
MCV RBC AUTO: 95.8 FL (ref 82.6–102.9)
MCV RBC AUTO: 95.8 FL (ref 82.6–102.9)
MONOCYTES # BLD: 6 % (ref 3–12)
MONOCYTES # BLD: 6 % (ref 3–12)
NRBC AUTOMATED: 0 PER 100 WBC
NRBC AUTOMATED: 0 PER 100 WBC
PDW BLD-RTO: 13 % (ref 11.8–14.4)
PDW BLD-RTO: 13 % (ref 11.8–14.4)
PHOSPHORUS: 3.9 MG/DL (ref 2.6–4.5)
PLATELET # BLD: 165 K/UL (ref 138–453)
PLATELET # BLD: 165 K/UL (ref 138–453)
PLATELET ESTIMATE: NORMAL
PLATELET ESTIMATE: NORMAL
PMV BLD AUTO: 13 FL (ref 8.1–13.5)
PMV BLD AUTO: 13 FL (ref 8.1–13.5)
POTASSIUM SERPL-SCNC: 4.1 MMOL/L (ref 3.7–5.3)
POTASSIUM SERPL-SCNC: 4.1 MMOL/L (ref 3.7–5.3)
PTH INTACT: 121.8 PG/ML (ref 15–65)
PTH INTACT: 121.8 PG/ML (ref 15–65)
RBC # BLD: 4.32 M/UL (ref 3.95–5.11)
RBC # BLD: 4.32 M/UL (ref 3.95–5.11)
RBC # BLD: NORMAL 10*6/UL
RBC # BLD: NORMAL 10*6/UL
SEG NEUTROPHILS: 51 % (ref 36–65)
SEG NEUTROPHILS: 51 % (ref 36–65)
SEGMENTED NEUTROPHILS ABSOLUTE COUNT: 2.48 K/UL (ref 1.5–8.1)
SEGMENTED NEUTROPHILS ABSOLUTE COUNT: 2.48 K/UL (ref 1.5–8.1)
SODIUM BLD-SCNC: 136 MMOL/L (ref 135–144)
SODIUM BLD-SCNC: 136 MMOL/L (ref 135–144)
THYROXINE, FREE: 1.06 NG/DL (ref 0.93–1.7)
TOTAL IRON BINDING CAPACITY: 345 UG/DL (ref 250–450)
TOTAL PROTEIN: 6.7 G/DL (ref 6.4–8.3)
TOTAL PROTEIN: 6.7 G/DL (ref 6.4–8.3)
TSH SERPL DL<=0.05 MIU/L-ACNC: 1.21 MIU/L (ref 0.3–5)
UNSATURATED IRON BINDING CAPACITY: 253 UG/DL (ref 112–347)
VITAMIN B-12: 309 PG/ML (ref 232–1245)
VITAMIN D 25-HYDROXY: 9.1 NG/ML (ref 30–100)
VITAMIN D 25-HYDROXY: 9.1 NG/ML (ref 30–100)
WBC # BLD: 4.8 K/UL (ref 3.5–11.3)
WBC # BLD: 4.8 K/UL (ref 3.5–11.3)
WBC # BLD: NORMAL 10*3/UL
WBC # BLD: NORMAL 10*3/UL

## 2021-03-09 PROCEDURE — 99213 OFFICE O/P EST LOW 20 MIN: CPT | Performed by: INTERNAL MEDICINE

## 2021-03-09 PROCEDURE — G8417 CALC BMI ABV UP PARAM F/U: HCPCS | Performed by: INTERNAL MEDICINE

## 2021-03-09 PROCEDURE — 1036F TOBACCO NON-USER: CPT | Performed by: INTERNAL MEDICINE

## 2021-03-09 PROCEDURE — G8484 FLU IMMUNIZE NO ADMIN: HCPCS | Performed by: INTERNAL MEDICINE

## 2021-03-09 PROCEDURE — 99211 OFF/OP EST MAY X REQ PHY/QHP: CPT | Performed by: INTERNAL MEDICINE

## 2021-03-09 PROCEDURE — G8427 DOCREV CUR MEDS BY ELIG CLIN: HCPCS | Performed by: INTERNAL MEDICINE

## 2021-03-09 RX ORDER — PANTOPRAZOLE SODIUM 40 MG/1
40 TABLET, DELAYED RELEASE ORAL DAILY
Qty: 60 TABLET | Refills: 3 | Status: SHIPPED | OUTPATIENT
Start: 2021-03-09

## 2021-03-09 RX ORDER — ERGOCALCIFEROL 1.25 MG/1
50000 CAPSULE ORAL WEEKLY
Qty: 12 CAPSULE | Refills: 0 | Status: SHIPPED | OUTPATIENT
Start: 2021-03-09

## 2021-03-09 RX ORDER — DIAPER,BRIEF,INFANT-TODD,DISP
EACH MISCELLANEOUS
Qty: 453.6 G | Refills: 1 | Status: SHIPPED | OUTPATIENT
Start: 2021-03-09 | End: 2021-03-16

## 2021-03-09 RX ORDER — PHENOL 1.4 %
1 AEROSOL, SPRAY (ML) MUCOUS MEMBRANE 2 TIMES DAILY
Qty: 270 TABLET | Refills: 2 | Status: SHIPPED | OUTPATIENT
Start: 2021-03-09

## 2021-03-09 RX ORDER — PROMETHAZINE HYDROCHLORIDE 25 MG/1
25 TABLET ORAL EVERY 8 HOURS PRN
Qty: 30 TABLET | Refills: 2 | Status: SHIPPED | OUTPATIENT
Start: 2021-03-09

## 2021-03-09 RX ORDER — ERGOCALCIFEROL 1.25 MG/1
50000 CAPSULE ORAL WEEKLY
Qty: 12 CAPSULE | Refills: 0 | Status: SHIPPED | OUTPATIENT
Start: 2021-03-09 | End: 2021-05-26

## 2021-03-09 RX ORDER — HYDROXYZINE HYDROCHLORIDE 25 MG/1
25 TABLET, FILM COATED ORAL EVERY 8 HOURS PRN
Qty: 40 TABLET | Refills: 0 | Status: SHIPPED | OUTPATIENT
Start: 2021-03-09 | End: 2021-03-29

## 2021-03-09 RX ORDER — DOCUSATE SODIUM 100 MG/1
100 CAPSULE, LIQUID FILLED ORAL 2 TIMES DAILY PRN
Qty: 60 CAPSULE | Refills: 3 | Status: SHIPPED | OUTPATIENT
Start: 2021-03-09

## 2021-03-09 NOTE — PATIENT INSTRUCTIONS
-Pt due for 6 month f/u in September-- pt to call in August to set up an appt--reminder in [facility] to contact patient as well--AVS given to patient    -Bloodwork orders given to patient, they will have them done before their next visit.     -Kacy Zhang

## 2021-03-09 NOTE — PROGRESS NOTES
[facility]      Date of Patient's Visit: 3/9/2021    Progress note    Patient Care Team:  Fidel Ferguson MD as PCP - General (Internal Medicine)  Fidel Ferguson MD as PCP - [facility]  Tim Cerna MD as Consulting Physician (Pulmonology)      CHIEF COMPLAINT  Chief Complaint   Patient presents with   [facility] Consult     skin removal, hernia repair    Health Maintenance     hep c pended, decline varicella    Eczema     on arms       SUBJECTIVE  Faiza Ip is a 45 y.o. female who presents for c/o   Contact dermatitis in left arm after using bleach     Pre op evaluation for pannuculectomy and abdominoplasty     Depression and anxiety : improved         ROS  All other review of systems negative, except for those noted.     Review of Systems    Past Medical History:   Diagnosis Date    Gastric reflux     Hernia of abdominal wall     History of anxiety     history of panic attacks    Hypertension     Migraine     Neuropathy     Obesities, morbid ([facility] 8/31/2018    TINO (obstructive sleep apnea) 12/17/2018    USES CPAP    Suicide attempt ([facility]     Wears glasses        Past Surgical History:   Procedure Laterality Date    ABDOMEN SURGERY      CHOLECYSTECTOMY, LAPAROSCOPIC  10/07/2019    Laparoscopic cholecystectomy    CHOLECYSTECTOMY, LAPAROSCOPIC N/A 10/7/2019    XI LAPAROSCOPIC ROBOTIC CHOLECYSTECTOMY performed by uHmera Riggs DO at [address] [facility]  04/01/2019    ROBOTIC LAPAROSCOPIC GASTRIC BYPASS ZAID-EN-Y, LIVER BIOPSY, EGD     ZAID-EN-Y GASTRIC BYPASS N/A 4/1/2019    XI ROBOTIC LAPAROSCOPIC GASTRIC BYPASS ZAID-EN-Y, LIVER BIOPSY, EGD performed by Humera Riggs DO at [facility] N/A 12/7/2018    EGD ESOPHAGOGASTRODUODENOSCOPY performed by Humera Riggs DO at [address] [place] [address] N/A 6/14/2019    egd  performed by Tom Garcia, DO at [facility]    WISDOM TOOTH EXTRACTION         Family History   Problem Relation Age of Onset    Thyroid Disease Mother     Diabetes Mother     Heart Disease Mother     High Blood Pressure Mother    Tiffany Officer Migraines Mother     Diabetes Father     Heart Disease Father     Thyroid Disease Father     Liver Disease Father     High Blood Pressure Father     Cancer Father         liver    Alcohol Abuse Father     Depression Father     Thyroid Disease Sister     Neuropathy Sister     High Blood Pressure Sister     Kidney Disease Paternal Aunt     Heart Disease Paternal Uncle     Migraines Maternal Grandmother     Stroke Maternal Grandfather     Cancer Maternal Grandfather         breast    Other Paternal Grandmother         epilepsy    Diabetes Paternal Grandmother     High Blood Pressure Paternal Grandmother     Diabetes Paternal Grandfather     High Blood Pressure Paternal Grandfather     Heart Disease Paternal Grandfather        Social History     Socioeconomic History    Marital status:      Spouse name: None    Number of children: None    Years of education: None    Highest education level: None   Occupational History    None   Social Needs    Financial resource strain: None    Food insecurity     Worry: None     Inability: None    Transportation needs     Medical: None     Non-medical: None   Tobacco Use    Smoking status: Passive Smoke Exposure - Never Smoker    Smokeless tobacco: Never Used   Substance and Sexual Activity    Alcohol use: Yes     Comment: social     Drug use: Not Currently     Comment: drug use history as a teen    Sexual activity: Yes   Lifestyle    Physical activity     Days per week: None     Minutes per session: None    Stress: None   Relationships    Social connections     Talks on phone: None     Gets together: None     Attends Ten[employer] service: None     Active member of club or organization: None     Attends meetings of clubs or organizations: None     Relationship status: None    Intimate partner violence     Fear of current or ex partner: None     Emotionally abused: None     Physically abused: None     Forced sexual activity: None   Other Topics Concern    None   Social History Narrative    None       Current Outpatient Medications   Medication Sig Dispense Refill    docusate sodium (COLACE) 100 MG capsule Take 1 capsule by mouth 2 times daily as needed for Constipation 60 capsule 3    pantoprazole (PROTONIX) 40 MG tablet Take 1 tablet by mouth daily 60 tablet 3    promethazine (PHENERGAN) 25 MG tablet Take 1 tablet by mouth every 8 hours as needed for Nausea 30 tablet 2    hydrOXYzine (ATARAX) 25 MG tablet Take 1 tablet by mouth every 8 hours as needed for Itching 40 tablet 0    hydrocortisone (ALA-LARISA) 1 % cream Apply topically 2 times daily. 453.6 g 1    vitamin D (ERGOCALCIFEROL) 1.25 MG (58239 UT) CAPS capsule Take 1 capsule by mouth once a week 12 capsule 0    calcium carbonate (CALCIUM 600) 600 MG TABS tablet Take 1 tablet by mouth 2 times daily 270 tablet 2    nystatin (MYCOSTATIN) 435555 UNIT/GM cream Apply topically 2 times daily. 30 g 0    traZODone (DESYREL) 50 MG tablet Take 0.5 tablets by mouth nightly as needed for Sleep 15 tablet 0    calcium carbonate (TUMS) 500 MG chewable tablet Take 1 tablet by mouth 2 times daily 60 tablet 5    vitamin D (ERGOCALCIFEROL) 1.25 MG (17535 UT) CAPS capsule Take 1 capsule by mouth once a week for 12 doses 12 capsule 0    PARoxetine (PAXIL) 10 MG tablet Take 0.5 tablets by mouth every morning for 7 days, THEN 1 tablet every morning for 23 days.  27 tablet 0    prazosin (MINIPRESS) 1 MG capsule Take 1 capsule by mouth nightly (Patient not taking: Reported on 3/9/2021) 30 capsule 0    Multiple Vitamins-Minerals (CELEBRATE MULTI-COMPLETE 36) CHEW Take 1 tablet by mouth 2 times daily (Patient not taking: Reported on 3/9/2021) 60 tablet 5     No current facility-administered medications for this visit. Allergies   Allergen Reactions    Latex Hives and Itching       PHYSICAL EXAM:   Vital Signs:   /64 (Site: Right Upper Arm, Position: Sitting, Cuff Size: Medium Adult)   Pulse 60   Ht 4' 9\" (1.448 m)   Wt 167 lb (75.8 kg)   BMI 36.14 kg/m²     BP Readings from Last 3 Encounters:   03/09/21 111/64   03/01/21 129/87   01/27/21 122/79        Wt Readings from Last 3 Encounters:   03/09/21 167 lb (75.8 kg)   03/01/21 165 lb 9.6 oz (75.1 kg)   01/27/21 169 lb 3.2 oz (76.7 kg)       Physical Exam  Cardiovascular:      Rate and Rhythm: Normal rate. Skin:     General: Skin is warm. Neurological:      General: No focal deficit present. Mental Status: She is alert and oriented to person, place, and time. Psychiatric:         Mood and Affect: Mood normal.         Body mass index is 36.14 kg/m². RESULTS    Lab Findings    CBC:   Lab Results   Component Value Date    WBC 4.8 03/09/2021    HGB 13.5 03/09/2021     03/09/2021     BMP:   Lab Results   Component Value Date     03/09/2021    K 4.1 03/09/2021     03/09/2021    CO2 25 03/09/2021    BUN 11 03/09/2021    CREATININE 0.41 03/09/2021    GLUCOSE 103 03/09/2021     HEMOGLOBIN A1C:   Lab Results   Component Value Date    LABA1C 4.8 03/09/2021     MICROALBUMIN URINE: No results found for: MICROALBUR  FASTING LIPID PANEL:   Lab Results   Component Value Date    CHOL 120 09/11/2020    HDL 42 09/11/2020    TRIG 114 09/11/2020     Lab Results   Component Value Date    LDLCHOLESTEROL 55 09/11/2020     LIVER PROFILE:   Lab Results   Component Value Date    ALT 27 03/09/2021    AST 25 03/09/2021    PROT 6.7 03/09/2021    BILITOT 0.77 03/09/2021    BILIDIR 0.22 06/13/2019    LABALBU 4.1 03/09/2021      THYROID FUNCTION:   Lab Results   Component Value Date    TSH 1.21 03/09/2021      URINE ANALYSIS: No results found for: [address]    1.  Slow transit constipation    -

## 2021-03-11 LAB — ZINC: 56.7 UG/DL (ref 60–120)

## 2021-03-12 LAB
RETINYL PALMITATE: <0.02 MG/L (ref 0–0.1)
VITAMIN A LEVEL: 0.35 MG/L (ref 0.3–1.2)
VITAMIN A, INTERP: NORMAL
VITAMIN B1 WHOLE BLOOD: 129 NMOL/L (ref 70–180)

## 2021-03-23 DIAGNOSIS — F43.10 PTSD (POST-TRAUMATIC STRESS DISORDER): ICD-10-CM

## 2021-03-23 DIAGNOSIS — F33.9 MAJOR DEPRESSIVE DISORDER, RECURRENT EPISODE WITH ANXIOUS DISTRESS (HCC): ICD-10-CM

## 2021-03-23 DIAGNOSIS — F31.81 BIPOLAR II DISORDER, MOST RECENT EPISODE DEPRESSED WITH RAPID CYCLING (HCC): Primary | ICD-10-CM

## 2021-03-23 NOTE — PROGRESS NOTES
Preoperative Instructions:    Stop eating solid foods at midnight the night prior to your surgery. Stop drinking clear liquids at midnight the night prior to your surgery. Arrive at the surgery center ( C entrance) on ___8-5-99____________ by ____0600___________. Please stop any blood thinning medications as directed by your surgeon or prescribing physician. Failure to stop certain medications may interfere with your scheduled surgery. These may include: Aspirin, Coumadin, Plavix, NSAIDS (Motrin, Aleve, Advil, Mobic, Celebrex), Eliquis, Pradaxa, Xarelto, Fish oil, and herbal supplements. Hold multivitamin as directed. You may continue the rest of your medications through the night before surgery unless instructed otherwise. Day of surgery please take only the following medication(s) with a small sip of water:None     Please contact your pcp regarding Minipress prescription. Please shower with Hibiclens soap if not allergic to the night before and the morning of surgery. Reminders:  -If you are going home the day of your procedure, you will need a family member or friend to stay during the procedure and drive you home after your procedure. Your  must be 25years of age or older and able to sign off on your discharge instructions.    -If you are going home the same day of your surgery, someone must remain with you for the first 24 hours after your surgery if you receive sedation or anesthesia.      -Please do not wear any jewelery, contacts or body piercing the day of surgery

## 2021-03-24 ENCOUNTER — ANESTHESIA EVENT (OUTPATIENT)
Dept: OPERATING ROOM | Age: 41
End: 2021-03-24
Payer: MEDICARE

## 2021-03-24 RX ORDER — PRAZOSIN HYDROCHLORIDE 1 MG/1
1 CAPSULE ORAL NIGHTLY
Qty: 30 CAPSULE | Refills: 0 | Status: SHIPPED | OUTPATIENT
Start: 2021-03-24 | End: 2021-09-30 | Stop reason: ALTCHOICE

## 2021-03-24 NOTE — TELEPHONE ENCOUNTER
.Please Approve or Refuse.   Send to Pharmacy per Pt's Request:   Next Visit Date:  Visit date not found   Last Visit Date: 1/5/2021

## 2021-03-27 ENCOUNTER — HOSPITAL ENCOUNTER (OUTPATIENT)
Dept: LAB | Age: 41
Setting detail: SPECIMEN
Discharge: HOME | End: 2021-03-27
Payer: MEDICARE

## 2021-03-27 DIAGNOSIS — Z20.822 COVID-19 RULED OUT BY LABORATORY TESTING: Primary | ICD-10-CM

## 2021-03-27 PROCEDURE — U0005 INFEC AGEN DETEC AMPLI PROBE: HCPCS

## 2021-03-27 PROCEDURE — U0003 INFECTIOUS AGENT DETECTION BY NUCLEIC ACID (DNA OR RNA); SEVERE ACUTE RESPIRATORY SYNDROME CORONAVIRUS 2 (SARS-COV-2) (CORONAVIRUS DISEASE [COVID-19]), AMPLIFIED PROBE TECHNIQUE, MAKING USE OF HIGH THROUGHPUT TECHNOLOGIES AS DESCRIBED BY CMS-2020-01-R: HCPCS

## 2021-03-28 LAB
SARS-COV-2: NORMAL
SARS-COV-2: NOT DETECTED
SOURCE: NORMAL

## 2021-03-30 ENCOUNTER — HOSPITAL ENCOUNTER (OUTPATIENT)
Age: 41
Discharge: HOME | End: 2021-03-30
Payer: MEDICARE

## 2021-03-30 PROCEDURE — 93005 ELECTROCARDIOGRAM TRACING: CPT | Performed by: ANESTHESIOLOGY

## 2021-03-31 LAB
EKG ATRIAL RATE: 54 BPM
EKG P AXIS: 41 DEGREES
EKG P-R INTERVAL: 168 MS
EKG Q-T INTERVAL: 432 MS
EKG QRS DURATION: 100 MS
EKG QTC CALCULATION (BAZETT): 409 MS
EKG R AXIS: 33 DEGREES
EKG T AXIS: 31 DEGREES
EKG VENTRICULAR RATE: 54 BPM

## 2021-03-31 PROCEDURE — 93010 ELECTROCARDIOGRAM REPORT: CPT | Performed by: INTERNAL MEDICINE

## 2021-04-01 ENCOUNTER — HOSPITAL ENCOUNTER (OUTPATIENT)
Age: 41
Setting detail: OUTPATIENT SURGERY
Discharge: HOME | End: 2021-04-01
Attending: PLASTIC SURGERY | Admitting: PLASTIC SURGERY
Payer: MEDICARE

## 2021-04-01 ENCOUNTER — ANESTHESIA (OUTPATIENT)
Dept: OPERATING ROOM | Age: 41
End: 2021-04-01
Payer: MEDICARE

## 2021-04-01 VITALS — OXYGEN SATURATION: 99 % | TEMPERATURE: 98.2 F | DIASTOLIC BLOOD PRESSURE: 54 MMHG | SYSTOLIC BLOOD PRESSURE: 106 MMHG

## 2021-04-01 VITALS
DIASTOLIC BLOOD PRESSURE: 74 MMHG | HEIGHT: 58 IN | BODY MASS INDEX: 35.05 KG/M2 | OXYGEN SATURATION: 98 % | SYSTOLIC BLOOD PRESSURE: 121 MMHG | RESPIRATION RATE: 20 BRPM | TEMPERATURE: 98.8 F | HEART RATE: 74 BPM | WEIGHT: 167 LBS

## 2021-04-01 DIAGNOSIS — G89.18 POST-OP PAIN: ICD-10-CM

## 2021-04-01 DIAGNOSIS — M62.08 DIASTASIS OF RECTUS ABDOMINIS: ICD-10-CM

## 2021-04-01 DIAGNOSIS — Z01.818 PRE-OP TESTING: Primary | ICD-10-CM

## 2021-04-01 DIAGNOSIS — E65 SYMPTOMATIC ABDOMINAL PANNICULUS: ICD-10-CM

## 2021-04-01 LAB — HCG, PREGNANCY URINE (POC): NEGATIVE

## 2021-04-01 PROCEDURE — 2500000003 HC RX 250 WO HCPCS: Performed by: ANESTHESIOLOGY

## 2021-04-01 PROCEDURE — 6370000000 HC RX 637 (ALT 250 FOR IP): Performed by: PLASTIC SURGERY

## 2021-04-01 PROCEDURE — C9290 INJ, BUPIVACAINE LIPOSOME: HCPCS | Performed by: ANESTHESIOLOGY

## 2021-04-01 PROCEDURE — 7100000010 HC PHASE II RECOVERY - FIRST 15 MIN: Performed by: PLASTIC SURGERY

## 2021-04-01 PROCEDURE — 7100000000 HC PACU RECOVERY - FIRST 15 MIN: Performed by: PLASTIC SURGERY

## 2021-04-01 PROCEDURE — 15830 EXC EXCESSIVE SKIN ABDOMEN: CPT | Performed by: PLASTIC SURGERY

## 2021-04-01 PROCEDURE — 88304 TISSUE EXAM BY PATHOLOGIST: CPT

## 2021-04-01 PROCEDURE — 2500000003 HC RX 250 WO HCPCS: Performed by: NURSE ANESTHETIST, CERTIFIED REGISTERED

## 2021-04-01 PROCEDURE — 64488 TAP BLOCK BI INJECTION: CPT | Performed by: ANESTHESIOLOGY

## 2021-04-01 PROCEDURE — 2580000003 HC RX 258: Performed by: ANESTHESIOLOGY

## 2021-04-01 PROCEDURE — 3700000001 HC ADD 15 MINUTES (ANESTHESIA): Performed by: PLASTIC SURGERY

## 2021-04-01 PROCEDURE — 7100000001 HC PACU RECOVERY - ADDTL 15 MIN: Performed by: PLASTIC SURGERY

## 2021-04-01 PROCEDURE — 6360000002 HC RX W HCPCS

## 2021-04-01 PROCEDURE — 49585 REPAIR UMBILICAL HERN,5+Y/O,REDUC: CPT | Performed by: SURGERY

## 2021-04-01 PROCEDURE — 6360000002 HC RX W HCPCS: Performed by: NURSE ANESTHETIST, CERTIFIED REGISTERED

## 2021-04-01 PROCEDURE — 3600000003 HC SURGERY LEVEL 3 BASE: Performed by: PLASTIC SURGERY

## 2021-04-01 PROCEDURE — 6360000002 HC RX W HCPCS: Performed by: ANESTHESIOLOGY

## 2021-04-01 PROCEDURE — 7100000011 HC PHASE II RECOVERY - ADDTL 15 MIN: Performed by: PLASTIC SURGERY

## 2021-04-01 PROCEDURE — 15847 EXC SKIN ABD ADD-ON: CPT | Performed by: PLASTIC SURGERY

## 2021-04-01 PROCEDURE — 3700000000 HC ANESTHESIA ATTENDED CARE: Performed by: PLASTIC SURGERY

## 2021-04-01 PROCEDURE — 3600000013 HC SURGERY LEVEL 3 ADDTL 15MIN: Performed by: PLASTIC SURGERY

## 2021-04-01 PROCEDURE — 2709999900 HC NON-CHARGEABLE SUPPLY: Performed by: PLASTIC SURGERY

## 2021-04-01 PROCEDURE — 81025 URINE PREGNANCY TEST: CPT

## 2021-04-01 RX ORDER — SODIUM CHLORIDE, SODIUM LACTATE, POTASSIUM CHLORIDE, CALCIUM CHLORIDE 600; 310; 30; 20 MG/100ML; MG/100ML; MG/100ML; MG/100ML
INJECTION, SOLUTION INTRAVENOUS CONTINUOUS
Status: DISCONTINUED | OUTPATIENT
Start: 2021-04-01 | End: 2021-04-01 | Stop reason: HOSPADM

## 2021-04-01 RX ORDER — MORPHINE SULFATE 2 MG/ML
2 INJECTION, SOLUTION INTRAMUSCULAR; INTRAVENOUS EVERY 5 MIN PRN
Status: DISCONTINUED | OUTPATIENT
Start: 2021-04-01 | End: 2021-04-01 | Stop reason: HOSPADM

## 2021-04-01 RX ORDER — LIDOCAINE HYDROCHLORIDE 10 MG/ML
INJECTION, SOLUTION EPIDURAL; INFILTRATION; INTRACAUDAL; PERINEURAL PRN
Status: DISCONTINUED | OUTPATIENT
Start: 2021-04-01 | End: 2021-04-01 | Stop reason: SDUPTHER

## 2021-04-01 RX ORDER — SODIUM CHLORIDE 0.9 % (FLUSH) 0.9 %
10 SYRINGE (ML) INJECTION EVERY 12 HOURS SCHEDULED
Status: DISCONTINUED | OUTPATIENT
Start: 2021-04-01 | End: 2021-04-01 | Stop reason: HOSPADM

## 2021-04-01 RX ORDER — OXYCODONE HYDROCHLORIDE AND ACETAMINOPHEN 5; 325 MG/1; MG/1
1 TABLET ORAL PRN
Status: DISCONTINUED | OUTPATIENT
Start: 2021-04-01 | End: 2021-04-01 | Stop reason: HOSPADM

## 2021-04-01 RX ORDER — MEPERIDINE HYDROCHLORIDE 50 MG/ML
12.5 INJECTION INTRAMUSCULAR; INTRAVENOUS; SUBCUTANEOUS EVERY 5 MIN PRN
Status: DISCONTINUED | OUTPATIENT
Start: 2021-04-01 | End: 2021-04-01 | Stop reason: HOSPADM

## 2021-04-01 RX ORDER — SODIUM CHLORIDE 0.9 % (FLUSH) 0.9 %
10 SYRINGE (ML) INJECTION PRN
Status: DISCONTINUED | OUTPATIENT
Start: 2021-04-01 | End: 2021-04-01 | Stop reason: HOSPADM

## 2021-04-01 RX ORDER — SODIUM CHLORIDE 9 MG/ML
INJECTION, SOLUTION INTRAVENOUS CONTINUOUS
Status: DISCONTINUED | OUTPATIENT
Start: 2021-04-01 | End: 2021-04-01 | Stop reason: HOSPADM

## 2021-04-01 RX ORDER — FENTANYL CITRATE 50 UG/ML
INJECTION, SOLUTION INTRAMUSCULAR; INTRAVENOUS PRN
Status: DISCONTINUED | OUTPATIENT
Start: 2021-04-01 | End: 2021-04-01 | Stop reason: SDUPTHER

## 2021-04-01 RX ORDER — MIDAZOLAM HYDROCHLORIDE 1 MG/ML
INJECTION INTRAMUSCULAR; INTRAVENOUS
Status: COMPLETED
Start: 2021-04-01 | End: 2021-04-01

## 2021-04-01 RX ORDER — PROMETHAZINE HYDROCHLORIDE 25 MG/ML
12.5 INJECTION, SOLUTION INTRAMUSCULAR; INTRAVENOUS
Status: DISCONTINUED | OUTPATIENT
Start: 2021-04-01 | End: 2021-04-01 | Stop reason: HOSPADM

## 2021-04-01 RX ORDER — ONDANSETRON 2 MG/ML
4 INJECTION INTRAMUSCULAR; INTRAVENOUS
Status: COMPLETED | OUTPATIENT
Start: 2021-04-01 | End: 2021-04-01

## 2021-04-01 RX ORDER — ONDANSETRON 2 MG/ML
INJECTION INTRAMUSCULAR; INTRAVENOUS PRN
Status: DISCONTINUED | OUTPATIENT
Start: 2021-04-01 | End: 2021-04-01 | Stop reason: SDUPTHER

## 2021-04-01 RX ORDER — MIDAZOLAM HYDROCHLORIDE 1 MG/ML
INJECTION INTRAMUSCULAR; INTRAVENOUS PRN
Status: DISCONTINUED | OUTPATIENT
Start: 2021-04-01 | End: 2021-04-01 | Stop reason: SDUPTHER

## 2021-04-01 RX ORDER — FENTANYL CITRATE 50 UG/ML
100 INJECTION, SOLUTION INTRAMUSCULAR; INTRAVENOUS ONCE
Status: CANCELLED | OUTPATIENT
Start: 2021-04-01 | End: 2021-04-01

## 2021-04-01 RX ORDER — BUPIVACAINE HYDROCHLORIDE 2.5 MG/ML
INJECTION, SOLUTION EPIDURAL; INFILTRATION; INTRACAUDAL
Status: COMPLETED
Start: 2021-04-01 | End: 2021-04-01

## 2021-04-01 RX ORDER — 0.9 % SODIUM CHLORIDE 0.9 %
500 INTRAVENOUS SOLUTION INTRAVENOUS
Status: DISCONTINUED | OUTPATIENT
Start: 2021-04-01 | End: 2021-04-01 | Stop reason: HOSPADM

## 2021-04-01 RX ORDER — CEFAZOLIN SODIUM 2 G/50ML
SOLUTION INTRAVENOUS PRN
Status: DISCONTINUED | OUTPATIENT
Start: 2021-04-01 | End: 2021-04-01 | Stop reason: SDUPTHER

## 2021-04-01 RX ORDER — ROCURONIUM BROMIDE 10 MG/ML
INJECTION, SOLUTION INTRAVENOUS PRN
Status: DISCONTINUED | OUTPATIENT
Start: 2021-04-01 | End: 2021-04-01 | Stop reason: SDUPTHER

## 2021-04-01 RX ORDER — PROPOFOL 10 MG/ML
INJECTION, EMULSION INTRAVENOUS PRN
Status: DISCONTINUED | OUTPATIENT
Start: 2021-04-01 | End: 2021-04-01 | Stop reason: SDUPTHER

## 2021-04-01 RX ORDER — BUPIVACAINE HYDROCHLORIDE 2.5 MG/ML
INJECTION, SOLUTION EPIDURAL; INFILTRATION; INTRACAUDAL
Status: COMPLETED | OUTPATIENT
Start: 2021-04-01 | End: 2021-04-01

## 2021-04-01 RX ORDER — OXYCODONE HYDROCHLORIDE AND ACETAMINOPHEN 5; 325 MG/1; MG/1
2 TABLET ORAL PRN
Status: DISCONTINUED | OUTPATIENT
Start: 2021-04-01 | End: 2021-04-01 | Stop reason: HOSPADM

## 2021-04-01 RX ORDER — NEOSTIGMINE METHYLSULFATE 5 MG/5 ML
SYRINGE (ML) INTRAVENOUS PRN
Status: DISCONTINUED | OUTPATIENT
Start: 2021-04-01 | End: 2021-04-01 | Stop reason: SDUPTHER

## 2021-04-01 RX ORDER — CEPHALEXIN 500 MG/1
500 CAPSULE ORAL 4 TIMES DAILY
Qty: 40 CAPSULE | Refills: 0 | Status: SHIPPED | OUTPATIENT
Start: 2021-04-01

## 2021-04-01 RX ORDER — LABETALOL 20 MG/4 ML (5 MG/ML) INTRAVENOUS SYRINGE
5 EVERY 10 MIN PRN
Status: DISCONTINUED | OUTPATIENT
Start: 2021-04-01 | End: 2021-04-01 | Stop reason: HOSPADM

## 2021-04-01 RX ORDER — HYDROCODONE BITARTRATE AND ACETAMINOPHEN 5; 325 MG/1; MG/1
1 TABLET ORAL EVERY 6 HOURS PRN
Qty: 28 TABLET | Refills: 0 | Status: SHIPPED | OUTPATIENT
Start: 2021-04-01 | End: 2021-04-08

## 2021-04-01 RX ORDER — DIPHENHYDRAMINE HYDROCHLORIDE 50 MG/ML
12.5 INJECTION INTRAMUSCULAR; INTRAVENOUS
Status: DISCONTINUED | OUTPATIENT
Start: 2021-04-01 | End: 2021-04-01 | Stop reason: HOSPADM

## 2021-04-01 RX ORDER — GLYCOPYRROLATE 1 MG/5 ML
SYRINGE (ML) INTRAVENOUS PRN
Status: DISCONTINUED | OUTPATIENT
Start: 2021-04-01 | End: 2021-04-01 | Stop reason: SDUPTHER

## 2021-04-01 RX ORDER — MIDAZOLAM HYDROCHLORIDE 2 MG/2ML
2 INJECTION, SOLUTION INTRAMUSCULAR; INTRAVENOUS ONCE
Status: CANCELLED | OUTPATIENT
Start: 2021-04-01 | End: 2021-04-01

## 2021-04-01 RX ORDER — BACLOFEN 10 MG/1
10 TABLET ORAL 3 TIMES DAILY
Qty: 30 TABLET | Refills: 0 | Status: SHIPPED | OUTPATIENT
Start: 2021-04-01

## 2021-04-01 RX ORDER — FENTANYL CITRATE 50 UG/ML
INJECTION, SOLUTION INTRAMUSCULAR; INTRAVENOUS
Status: COMPLETED
Start: 2021-04-01 | End: 2021-04-01

## 2021-04-01 RX ORDER — BACLOFEN 10 MG/1
10 TABLET ORAL ONCE
Status: COMPLETED | OUTPATIENT
Start: 2021-04-01 | End: 2021-04-01

## 2021-04-01 RX ORDER — ONDANSETRON 2 MG/ML
INJECTION INTRAMUSCULAR; INTRAVENOUS
Status: COMPLETED
Start: 2021-04-01 | End: 2021-04-01

## 2021-04-01 RX ORDER — DEXAMETHASONE SODIUM PHOSPHATE 10 MG/ML
INJECTION, SOLUTION INTRAMUSCULAR; INTRAVENOUS PRN
Status: DISCONTINUED | OUTPATIENT
Start: 2021-04-01 | End: 2021-04-01 | Stop reason: SDUPTHER

## 2021-04-01 RX ADMIN — SODIUM CHLORIDE, POTASSIUM CHLORIDE, SODIUM LACTATE AND CALCIUM CHLORIDE: 600; 310; 30; 20 INJECTION, SOLUTION INTRAVENOUS at 06:44

## 2021-04-01 RX ADMIN — ONDANSETRON 4 MG: 2 INJECTION INTRAMUSCULAR; INTRAVENOUS at 11:05

## 2021-04-01 RX ADMIN — MIDAZOLAM HYDROCHLORIDE 2 MG: 1 INJECTION, SOLUTION INTRAMUSCULAR; INTRAVENOUS at 08:31

## 2021-04-01 RX ADMIN — Medication 2 MG: at 11:09

## 2021-04-01 RX ADMIN — DEXAMETHASONE SODIUM PHOSPHATE 5 MG: 10 INJECTION, SOLUTION INTRAMUSCULAR; INTRAVENOUS at 08:56

## 2021-04-01 RX ADMIN — SODIUM CHLORIDE, POTASSIUM CHLORIDE, SODIUM LACTATE AND CALCIUM CHLORIDE: 600; 310; 30; 20 INJECTION, SOLUTION INTRAVENOUS at 09:10

## 2021-04-01 RX ADMIN — BACLOFEN 10 MG: 10 TABLET ORAL at 12:21

## 2021-04-01 RX ADMIN — ROCURONIUM BROMIDE 20 MG: 10 INJECTION INTRAVENOUS at 10:11

## 2021-04-01 RX ADMIN — Medication 0.4 MG: at 11:09

## 2021-04-01 RX ADMIN — HYDROMORPHONE HYDROCHLORIDE 0.5 MG: 1 INJECTION, SOLUTION INTRAMUSCULAR; INTRAVENOUS; SUBCUTANEOUS at 11:52

## 2021-04-01 RX ADMIN — LIDOCAINE HYDROCHLORIDE 20 MG: 10 INJECTION, SOLUTION EPIDURAL; INFILTRATION; INTRACAUDAL; PERINEURAL at 08:36

## 2021-04-01 RX ADMIN — FENTANYL CITRATE 100 MCG: 50 INJECTION, SOLUTION INTRAMUSCULAR; INTRAVENOUS at 08:17

## 2021-04-01 RX ADMIN — HYDROMORPHONE HYDROCHLORIDE 0.5 MG: 1 INJECTION, SOLUTION INTRAMUSCULAR; INTRAVENOUS; SUBCUTANEOUS at 12:03

## 2021-04-01 RX ADMIN — ONDANSETRON 4 MG: 2 INJECTION INTRAMUSCULAR; INTRAVENOUS at 11:52

## 2021-04-01 RX ADMIN — Medication 0.5 MG: at 12:03

## 2021-04-01 RX ADMIN — FENTANYL CITRATE 25 MCG: 50 INJECTION, SOLUTION INTRAMUSCULAR; INTRAVENOUS at 11:24

## 2021-04-01 RX ADMIN — MIDAZOLAM HYDROCHLORIDE 2 MG: 1 INJECTION, SOLUTION INTRAMUSCULAR; INTRAVENOUS at 08:17

## 2021-04-01 RX ADMIN — BUPIVACAINE 20 ML: 13.3 INJECTION, SUSPENSION, LIPOSOMAL INFILTRATION at 08:25

## 2021-04-01 RX ADMIN — Medication 1 MG: at 11:06

## 2021-04-01 RX ADMIN — BUPIVACAINE HYDROCHLORIDE 60 ML: 2.5 INJECTION, SOLUTION EPIDURAL; INFILTRATION; INTRACAUDAL; PERINEURAL at 08:25

## 2021-04-01 RX ADMIN — ROCURONIUM BROMIDE 50 MG: 10 INJECTION INTRAVENOUS at 08:36

## 2021-04-01 RX ADMIN — PROPOFOL 150 MG: 10 INJECTION, EMULSION INTRAVENOUS at 08:36

## 2021-04-01 RX ADMIN — FENTANYL CITRATE 100 MCG: 50 INJECTION, SOLUTION INTRAMUSCULAR; INTRAVENOUS at 08:36

## 2021-04-01 RX ADMIN — FENTANYL CITRATE 25 MCG: 50 INJECTION, SOLUTION INTRAMUSCULAR; INTRAVENOUS at 11:16

## 2021-04-01 RX ADMIN — Medication 0.2 MG: at 11:06

## 2021-04-01 RX ADMIN — CEFAZOLIN SODIUM 2 G: 2 SOLUTION INTRAVENOUS at 08:58

## 2021-04-01 RX ADMIN — Medication 0.5 MG: at 11:52

## 2021-04-01 RX ADMIN — FENTANYL CITRATE 50 MCG: 50 INJECTION, SOLUTION INTRAMUSCULAR; INTRAVENOUS at 10:11

## 2021-04-01 RX ADMIN — FENTANYL CITRATE 25 MCG: 50 INJECTION, SOLUTION INTRAMUSCULAR; INTRAVENOUS at 10:45

## 2021-04-01 RX ADMIN — FENTANYL CITRATE 25 MCG: 50 INJECTION, SOLUTION INTRAMUSCULAR; INTRAVENOUS at 11:06

## 2021-04-01 ASSESSMENT — PULMONARY FUNCTION TESTS
PIF_VALUE: 19
PIF_VALUE: 19
PIF_VALUE: 18
PIF_VALUE: 19
PIF_VALUE: 16
PIF_VALUE: 20
PIF_VALUE: 19
PIF_VALUE: 18
PIF_VALUE: 19
PIF_VALUE: 2
PIF_VALUE: 19
PIF_VALUE: 18
PIF_VALUE: 18
PIF_VALUE: 3
PIF_VALUE: 2
PIF_VALUE: 0
PIF_VALUE: 19
PIF_VALUE: 19
PIF_VALUE: 18
PIF_VALUE: 18
PIF_VALUE: 17
PIF_VALUE: 19
PIF_VALUE: 2
PIF_VALUE: 18
PIF_VALUE: 19
PIF_VALUE: 19
PIF_VALUE: 18
PIF_VALUE: 18
PIF_VALUE: 1
PIF_VALUE: 16
PIF_VALUE: 19
PIF_VALUE: 19
PIF_VALUE: 2
PIF_VALUE: 2
PIF_VALUE: 17
PIF_VALUE: 19
PIF_VALUE: 2
PIF_VALUE: 21
PIF_VALUE: 19
PIF_VALUE: 18
PIF_VALUE: 17
PIF_VALUE: 17
PIF_VALUE: 0
PIF_VALUE: 18
PIF_VALUE: 6
PIF_VALUE: 18
PIF_VALUE: 1
PIF_VALUE: 19
PIF_VALUE: 17
PIF_VALUE: 19
PIF_VALUE: 20
PIF_VALUE: 16
PIF_VALUE: 17
PIF_VALUE: 16
PIF_VALUE: 19
PIF_VALUE: 18
PIF_VALUE: 18
PIF_VALUE: 16
PIF_VALUE: 19
PIF_VALUE: 17
PIF_VALUE: 19
PIF_VALUE: 19
PIF_VALUE: 17
PIF_VALUE: 18
PIF_VALUE: 1
PIF_VALUE: 3
PIF_VALUE: 19
PIF_VALUE: 19
PIF_VALUE: 18
PIF_VALUE: 2
PIF_VALUE: 22
PIF_VALUE: 18
PIF_VALUE: 19
PIF_VALUE: 19
PIF_VALUE: 4
PIF_VALUE: 16
PIF_VALUE: 18
PIF_VALUE: 18
PIF_VALUE: 19
PIF_VALUE: 19
PIF_VALUE: 21
PIF_VALUE: 3
PIF_VALUE: 16
PIF_VALUE: 18
PIF_VALUE: 19
PIF_VALUE: 18
PIF_VALUE: 17
PIF_VALUE: 19
PIF_VALUE: 16
PIF_VALUE: 17

## 2021-04-01 ASSESSMENT — PAIN SCALES - GENERAL
PAINLEVEL_OUTOF10: 5
PAINLEVEL_OUTOF10: 7

## 2021-04-01 ASSESSMENT — PAIN DESCRIPTION - PAIN TYPE: TYPE: SURGICAL PAIN

## 2021-04-01 ASSESSMENT — PAIN DESCRIPTION - LOCATION: LOCATION: ABDOMEN

## 2021-04-01 NOTE — ANESTHESIA PRE PROCEDURE
Department of Anesthesiology  Preprocedure Note       Name:  Queenie Magaña   Age:  45 y.o.  :  1980                                          MRN:  [id number]         Date:  2021      Surgeon: Majo Mishra):  MD Abbie Potter DO    Procedure: Procedure(s):  PANNICULECTOMY  ABDOMINOPLASTY WITH INCISIONAL WOUND VAC AND BIOPATCH AROUND ELIZABETH DRAIN AND PRE OP TAP BLOCK  HERNIA UMBILICAL REPAIR    Medications prior to admission:   Prior to Admission medications    Medication Sig Start Date End Date Taking? Authorizing Provider   prazosin (MINIPRESS) 1 MG capsule take 1 capsule by mouth nightly 3/24/21  Yes MIKO Barnes NP   docusate sodium (COLACE) 100 MG capsule Take 1 capsule by mouth 2 times daily as needed for Constipation 3/9/21  Yes Cuate Greene MD   pantoprazole (PROTONIX) 40 MG tablet Take 1 tablet by mouth daily 3/9/21  Yes Cuate Greene MD   promethazine (PHENERGAN) 25 MG tablet Take 1 tablet by mouth every 8 hours as needed for Nausea 3/9/21  Yes Cuate Greene MD   vitamin D (ERGOCALCIFEROL) 1.25 MG (11178 UT) CAPS capsule Take 1 capsule by mouth once a week for 12 doses 3/9/21 5/26/21 Yes MIKO Deluna CNP   vitamin D (ERGOCALCIFEROL) 1.25 MG (45157 UT) CAPS capsule Take 1 capsule by mouth once a week 3/9/21  Yes Cuate Greene MD   calcium carbonate (CALCIUM 600) 600 MG TABS tablet Take 1 tablet by mouth 2 times daily 3/9/21  Yes Cuate Greene MD   nystatin (MYCOSTATIN) 297806 UNIT/GM cream Apply topically 2 times daily.  2/10/21  Yes Nancy Price MD   traZODone (DESYREL) 50 MG tablet Take 0.5 tablets by mouth nightly as needed for Sleep 20  Yes MIKO Barnes NP   Multiple Vitamins-Minerals (CELEBRATE MULTI-COMPLETE 36) CHEW Take 1 tablet by mouth 2 times daily 5/15/20  Yes Cuate Greene MD   calcium carbonate (TUMS) 500 MG chewable tablet Take 1 tablet by mouth 2 times daily 5/15/20  Yes Cuate Greene MD   PARoxetine (PAXIL) 10 MG tablet Take 0.5 tablets by mouth every morning for 7 days, THEN 1 tablet every morning for 23 days. 11/20/20 1/27/21  MIKO Novak NP       Current medications:    Current Facility-Administered Medications   Medication Dose Route Frequency Provider Last Rate Last Admin    0.9 % sodium chloride infusion   Intravenous Continuous Josh Flannery MD        lactated ringers infusion   Intravenous Continuous Josh Flannery  mL/hr at 04/01/21 0644 New Bag at 04/01/21 0644    sodium chloride flush 0.9 % injection 10 mL  10 mL Intravenous 2 times per day Josh Flannery MD        sodium chloride flush 0.9 % injection 10 mL  10 mL Intravenous PRN Josh Flannery MD        bupivacaine (PF) (MARCAINE) 0.25 % injection             bupivacaine liposome (EXPAREL) 1.3 % injection             fentaNYL (SUBLIMAZE) 100 MCG/2ML injection             midazolam (VERSED) 2 MG/2ML injection             ceFAZolin (ANCEF) IVPB                Allergies: Allergies   Allergen Reactions    Latex Hives and Itching       Problem List:    Patient Active Problem List   Diagnosis Code    Chronic migraine G43.709    Neuropathy G62.9    Vitamin D deficiency E55.9    Antral gastritis K29.50    TINO (obstructive sleep apnea) G47.33    Status post gastric bypass for obesity Z98.84    Restless leg syndrome G25.81    Folate deficiency E53.8    Low zinc level E60    Gastroesophageal reflux disease without esophagitis K21.9    Obesity (BMI 30-39. 9) E66.9    Bipolar II disorder, most recent episode depressed with rapid cycling ([facility] [address] F31.81    Near syncope R55    Major depressive disorder, recurrent episode with anxious distress ([facility] F33.9    PTSD (post-traumatic stress disorder) F43.10    Excess skin of abdomen L98.7    Symptomatic abdominal panniculus E65    Diastasis of rectus abdominis M62.08    Rash R21       Past Medical History:        Diagnosis Date    Acid reflux     Gastric reflux     Hernia of abdominal wall     History of anxiety history of panic attacks    Hypertension     Migraine     Neuropathy     Obesities, morbid ([facility] [address] 8/31/2018    TINO (obstructive sleep apnea) 12/17/2018    USES CPAP    Suicide [facility] ([facility]     Thyroid disease     Wears glasses        Past Surgical History:        Procedure Laterality Date    ABDOMEN SURGERY      CHOLECYSTECTOMY, LAPAROSCOPIC  10/07/2019    Laparoscopic cholecystectomy    CHOLECYSTECTOMY, LAPAROSCOPIC N/A 10/7/2019    XI LAPAROSCOPIC ROBOTIC CHOLECYSTECTOMY performed by Jodi Mackenzie DO at [address], COLON, DIAGNOSTIC      ZAID-EN-Y GASTRIC BYPASS  04/01/2019    ROBOTIC LAPAROSCOPIC GASTRIC BYPASS ZAID-EN-Y, LIVER BIOPSY, EGD     ZAID-EN-Y GASTRIC BYPASS N/A 4/1/2019    XI ROBOTIC LAPAROSCOPIC GASTRIC BYPASS ZAID-EN-Y, LIVER BIOPSY, EGD performed by Jodi Mackenzie DO at [facility] N/A 12/7/2018    EGD ESOPHAGOGASTRODUODENOSCOPY performed by Jodi Mackenzie DO at [address] [place] [address] N/A 6/14/2019    egd  performed by [facility] Mackenzie DO at [facility]    WISDOM TOOTH EXTRACTION         Social History:    Social History     Tobacco Use    Smoking status: Passive Smoke Exposure - Never Smoker    Smokeless tobacco: Never Used   Substance Use Topics    Alcohol use: Yes     Comment: social                                 Counseling given: Not Answered      Vital Signs (Current):   Vitals:    04/01/21 0602   BP: 137/86   Pulse: 62   Resp: 16   Temp: 97.8 °F (36.6 °C)   TempSrc: Infrared   SpO2: 98%   Weight: 167 lb (75.8 kg)   Height: 4' 10\" (1.473 m)                                              BP Readings from Last 3 Encounters:   04/01/21 137/86   03/09/21 111/64   03/01/21 129/87       NPO Status: Time of last liquid consumption: 2100                        Time of last solid consumption: 1900                        Date of last liquid consumption: 03/31/21                        Date of last solid food consumption: 03/31/21    BMI:   Wt Readings from Last 3 Encounters:   04/01/21 167 lb (75.8 kg)   03/09/21 167 lb (75.8 kg)   03/01/21 165 lb 9.6 oz (75.1 kg)     Body mass index is 34.9 kg/m². CBC:   Lab Results   Component Value Date    WBC 4.8 03/09/2021    RBC 4.32 03/09/2021    HGB 13.5 03/09/2021    HCT 41.4 03/09/2021    MCV 95.8 03/09/2021    RDW 13.0 03/09/2021     03/09/2021       CMP:   Lab Results   Component Value Date     03/09/2021    K 4.1 03/09/2021     03/09/2021    CO2 25 03/09/2021    BUN 11 03/09/2021    CREATININE 0.41 03/09/2021    GFRAA >60 03/09/2021    LABGLOM >60 03/09/2021    GLUCOSE 103 03/09/2021    PROT 6.7 03/09/2021    CALCIUM 9.0 03/09/2021    BILITOT 0.77 03/09/2021    ALKPHOS 80 03/09/2021    AST 25 03/09/2021    ALT 27 03/09/2021       POC Tests: No results for input(s): POCGLU, POCNA, POCK, POCCL, POCBUN, POCHEMO, POCHCT in the last 72 hours.     Coags:   Lab Results   Component Value Date    PROTIME 12.2 10/07/2019    INR 1.0 10/07/2019    INR 1.0 03/18/2019    APTT 23.9 03/18/2019       HCG (If Applicable):   Lab Results   Component Value Date    HCG NEGATIVE 04/01/2021        ABGs: No results found for: PHART, PO2ART, HVB1IYC, TGE5QFN, BEART, C7WVJPWF     Type & Screen (If Applicable):  No results found for: LABABO, LABRH    Drug/Infectious Status (If Applicable):  No results found for: HIV, HEPCAB    COVID-19 Screening (If Applicable):   Lab Results   Component Value Date    COVID19 Not Detected 03/27/2021           Anesthesia Evaluation  Patient summary reviewed and Nursing notes reviewed  Airway: Mallampati: II  TM distance: >3 FB   Neck ROM: full  Mouth opening: > = 3 FB Dental:      Comment: -MISSING SOME UPPER AND LOWER TEETH    Pulmonary:normal exam    (+) sleep apnea: on CPAP,                             Cardiovascular:    (+) hypertension:,       ECG reviewed ROS comment: -EKG - SB @ 54     Neuro/Psych:   Negative Neuro/Psych ROS              GI/Hepatic/Renal:   (+) GERD: well controlled, morbid obesity          Endo/Other: Negative Endo/Other ROS                     ROS comment: -NPO AFTER MIDNIGHT  -ALLERGIES - LATEX Abdominal:           Vascular: negative vascular ROS. Anesthesia Plan      general and regional     ASA 2     (GETA, TAP BLOCK)  Induction: intravenous. MIPS: Postoperative opioids intended and Prophylactic antiemetics administered. Anesthetic plan and risks discussed with patient. Plan discussed with CRNA.     Attending anesthesiologist reviewed and agrees with Pre Eval content              Rocio Wolfe MD   4/1/2021

## 2021-04-01 NOTE — H&P
[address]      Patient's Name/ Date of Birth/ Gender: Radha Felix / 1980 (45 y.o.) / female     Attending physician: Mandy Judd MD    CC: umbilical hernia    History of present Illness: Radha Felix is a 45 y.o. female, presents today for panniculectomy/abdominoplasty, open umbilical hernia repair is planned for small umbilical hernia. Past Medical History:  has a past medical history of Acid reflux, Gastric reflux, Hernia of abdominal wall, History of anxiety, Hypertension, Migraine, Neuropathy, Obesities, morbid ([address] TINO (obstructive sleep apnea), Suicide attempt [facility]), Thyroid disease, and Wears glasses. Past Surgical History:   Past Surgical History:   Procedure Laterality Date    ABDOMEN SURGERY      CHOLECYSTECTOMY, LAPAROSCOPIC  10/07/2019    Laparoscopic cholecystectomy    CHOLECYSTECTOMY, LAPAROSCOPIC N/A 10/7/2019    XI LAPAROSCOPIC ROBOTIC CHOLECYSTECTOMY performed by Keary Baumgarten, DO at [address], COLON, DIAGNOSTIC      ZAID-EN-Y GASTRIC BYPASS  04/01/2019    ROBOTIC LAPAROSCOPIC GASTRIC BYPASS ZAID-EN-Y, LIVER BIOPSY, EGD     ZAID-EN-Y GASTRIC BYPASS N/A 4/1/2019    XI ROBOTIC LAPAROSCOPIC GASTRIC BYPASS ZAID-EN-Y, LIVER BIOPSY, EGD performed by Keary Baumgarten, DO at [address] ENDOSCOPY N/A 12/7/2018    EGD ESOPHAGOGASTRODUODENOSCOPY performed by Keary Baumgarten, DO at [facility]    UPPER GASTROINTESTINAL ENDOSCOPY N/A 6/14/2019    egd  performed by Keary Baumgarten, DO at [facility]    WISDOM TOOTH EXTRACTION         Social History:  reports that she is a non-smoker but has been exposed to tobacco smoke. She has never used smokeless tobacco. She reports current alcohol use. She reports previous drug use.     Family History: family history includes Alcohol Abuse in her father; Cancer in her father and maternal grandfather; Pulse: 62   Resp: 16   Temp: 97.8 °F (36.6 °C)   SpO2: 98%       Physical Exam:  Vital signs and Nurse's note reviewed  Gen:  A&Ox3, NAD  HEENT: NCAT, PERRLA, EOMI, no scleral icterus, oral mucosa moist  Neck: Trachea midline without obvious masses or lesions  Chest: Symmetric rise with inhalation, no evidence of trauma  CVS: S1S2  Resp: Good bilateral air entry, no audible wheeze or rhonchi  Abd: soft, non-tender, non-distended, no hepatosplenomegaly or palpable masses  Ext: No clubbing, cyanosis, edema, peripheral pulses 2+ Rad/Fem/DP/PT  CNS: Moves all extremities, no gross focal motor deficits  Skin: No erythema or ulcerations         Assessment:    Chay Vo is a 45 y.o. female with     Umbilical hernia    Plan:    1.  To OR for open umbilical hernia repair, primary repair given its size, all questions answered, written informed consent obtained      Halbert Sacks  4/1/2021

## 2021-04-01 NOTE — OP NOTE
Operative Note      Patient: Mell Luevano  YOB: 1980  MRN: [id number]    Date of Procedure: 4/1/2021    Pre-Op Diagnosis: ABDOMINAL RECTUS DIASTASIS  SYMPTOMATIC ABDOMINAL PANICULITIS, UMBILICAL HERNIA    Post-Op Diagnosis: UMBILICAL HERNIA ~0JA IN DIAMETER       Procedure(s): UMBILICAL HERNIA, PRIMARY REPAIR    Surgeon(s):  MD Ernesto Atwood DO    Assistant:   * No surgical staff found *    Anesthesia: General    Estimated Blood Loss (mL): Minimal    Complications: None    Specimens:   * No specimens in log *    Implants:  * No implants in log *      Drains: * No LDAs found *    Findings: AS ABOVE. WOUND CLASS 1    Detailed Description of Procedure:     HISTORY: The patient is a 45y.o. year old female undergoing panniculectomy with abdominoplasty, found to have umbilical hernia preoperatively, request was made for hernia repair during same procedure. The risk, benefits, expected outcome, and alternatives to the procedure were explained to the patient who expresses understanding and is in agreement. Operative detail:    My portion of the surgery was started while panniculectomy was in progress. Timeout was performed verifying correct patient, position, equipment and procedure to be performed. The umbilicus was dissected off of the abdominal wall, the base of the umbilical stalk was skeletonized at the fascial level and partially transected, there was a minimal amount of preperitoneal fat that was removed, otherwise no obvious hernia sac noted and no other incarcerated content, the tissues appeared quite healthy and the defect was ~6mm. The defect was closed with interrupted sutures of 0-vicryl. At this point the panniculectomy/abdominplasty was continued per Dr. Shaggy Fregoso. All sponge needle and instrument counts were correct at the end of this portion of the surgery.     Electronically signed by Ernesto Fallon DO on 4/1/2021 at 9:37 AM

## 2021-04-01 NOTE — ANESTHESIA PROCEDURE NOTES
Peripheral Block    Patient location during procedure: pre-op  Start time: 4/1/2021 8:19 AM  End time: 4/1/2021 8:25 AM  Staffing  Performed: anesthesiologist   Anesthesiologist: Karlo Noe MD  Preanesthetic Checklist  Completed: patient identified, IV checked, site marked, risks and benefits discussed, surgical consent, monitors and equipment checked, pre-op evaluation, timeout performed, anesthesia consent given, oxygen available and patient being monitored  Peripheral Block  Patient position: supine  Prep: ChloraPrep  Patient monitoring: cardiac monitor, continuous pulse ox and frequent blood pressure checks  Block type: TAP  Laterality: bilateral  Injection technique: single-shot  Guidance: ultrasound guided  Provider prep: mask and sterile gloves  Needle  Needle type: combined needle/nerve stimulator   Needle gauge: 20 G  Needle length: 4 IN.   Needle localization: anatomical landmarks and ultrasound guidance  Assessment  Injection assessment: negative aspiration for heme, no paresthesia on injection and local visualized surrounding nerve on ultrasound  Paresthesia pain: none  Slow fractionated injection: yes  Hemodynamics: stable  Medications Administered  Bupivacaine liposome (EXPAREL) injection 1.3%, 20 mL  bupivacaine (MARCAINE) PF injection 0.25%, 60 mL  Reason for block: post-op pain management and at surgeon's request

## 2021-04-01 NOTE — H&P
History and Physical           Chief Complaint   Patient presents with    New Patient       Patient states she is here to discuss panniculectomy.         HPI:   Constance Noel is a 45 y.o. female who presents status post bariatric surgery. Patient's initial weight was 296 pounds. Patient currently weighs 169 pounds. Patient has lost greater than 100 pounds. Patient has maintained her weight for greater than 1 9 months. Patient has a pannus that extends down past her pubis. It causes her pain and discomfort. It affects her quality of life. Patient continues to get rashes. Patient continues to treat rashes. However they do continue to recur. Her large pannus also interferes with her daily activities. It also interferes with her being able to do more exercises. It interferes with her having intercourse and not being to find clothes that fit symmetrically. It also interferes with her urination and seatbelt not fitting well. Her large pannus interferes with her daily hygiene such as cleaning her feet and has an odor to it. This has been going on for greater than 6 months. Patient is here for evaluation treatment.     Medications:      Current Facility-Administered Medications[]Expand by Default          Current Outpatient Medications   Medication Sig Dispense Refill    pantoprazole (PROTONIX) 40 MG tablet Take 1 tablet by mouth daily 60 tablet 3    docusate sodium (COLACE) 100 MG capsule Take 1 capsule by mouth 2 times daily as needed for Constipation 60 capsule 3    PARoxetine (PAXIL) 10 MG tablet Take 0.5 tablets by mouth every morning for 7 days, THEN 1 tablet every morning for 23 days.  27 tablet 0    traZODone (DESYREL) 50 MG tablet Take 0.5 tablets by mouth nightly as needed for Sleep 15 tablet 0    promethazine (PHENERGAN) 25 MG tablet Take 1 tablet by mouth every 8 hours as needed for Nausea 30 tablet 2    Multiple Vitamins-Minerals (CELEBRATE MULTI-COMPLETE 36) CHEW Take 1 tablet by mouth 2 times daily 60 tablet 5    calcium carbonate (TUMS) 500 MG chewable tablet Take 1 tablet by mouth 2 times daily 60 tablet 5    prazosin (MINIPRESS) 1 MG capsule Take 1 capsule by mouth nightly 30 capsule 0      No current facility-administered medications for this visit. Allergies:           Allergies   Allergen Reactions    Latex Hives and Itching      Review of Systems:   Constitutional: Negative for fever, chills, fatigue and unexpected weight change. HENT: Patient has a history of migraines. Eyes: Negative for pain and discharge. Respiratory: Patient has a history of sleep apnea. Cardiovascular patient has a history of hypertension. Gastrointestinal: Patient has a history of gastric reflux. Skin: Negative for pallor and rash. Neurological: Patient has a history of neuropathy. Hematological: Does not bruise/bleed easily. Psychiatric/Behavioral: Negative for behavioral problems.   Has a history of anxiety.     Past Medical History[]Expand by Default        Past Medical History:   Diagnosis Date    Gastric reflux      History of anxiety       history of panic attacks    Hypertension      Migraine      Neuropathy      Obesities, morbid ([facility] [address] 8/31/2018    TINO (obstructive sleep apnea) 12/17/2018     USES CPAP    Suicide attempt ([facility] [address]      Wears glasses           Past Surgical History[]Expand by Default         Past Surgical History:   Procedure Laterality Date    ABDOMEN SURGERY        CHOLECYSTECTOMY, LAPAROSCOPIC   10/07/2019     Laparoscopic cholecystectomy    CHOLECYSTECTOMY, LAPAROSCOPIC N/A 10/7/2019     XI LAPAROSCOPIC ROBOTIC CHOLECYSTECTOMY performed by Saadia Ang DO at [address] [place] [address]   04/01/2019     ROBOTIC LAPAROSCOPIC GASTRIC BYPASS ZAID-EN-Y, LIVER BIOPSY, EGD     ZAID-EN-Y GASTRIC BYPASS N/A 4/1/2019     XI ROBOTIC LAPAROSCOPIC GASTRIC BYPASS ZAID-EN-Y, LIVER BIOPSY, EGD performed by Saadia Ang DO at [facility] - yes   Other:      Back pain? Yes              Any Treatment? No        Any Testing? No   Where/What:      Have you had gastric bypass? Yes              Initial weight: 296 lb        Weight stable for how long? 9+ months   Do you have a hernia? No        Testing: No     Date/location:   Have you had a hernia repair in the past?       No        Was mesh used? No        Surgeon for hernia:     Any Rashes/Ulcers under folds of skin? Yes              Medications used:                                                        OTC Baby Powder, diaper cream                                                                                               RX Nystatin Powder          Notes:   Imaging:   @[address]@         Impression/Plan:        Diagnosis Orders   1. Diastasis of rectus abdominis  CT ABDOMEN PELVIS W IV CONTRAST Additional Contrast? Oral   2. Symptomatic abdominal panniculus      3. Rash             Patient Active Problem List   Diagnosis    Chronic migraine    Neuropathy    Vitamin D deficiency    Antral gastritis    TINO (obstructive sleep apnea)    Status post gastric bypass for obesity    Restless leg syndrome    Folate deficiency    Low zinc level    Gastroesophageal reflux disease without esophagitis    Obesity (BMI 30-39. 9)    Bipolar II disorder, most recent episode depressed with rapid cycling ([address]    Near syncope    Major depressive disorder, recurrent episode with anxious distress ([address]    PTSD (post-traumatic stress disorder)    Excess skin of abdomen    Symptomatic abdominal panniculus    Diastasis of rectus abdominis    Rash      Plan:  I discussed the differences between panniculectomy and abdominoplasty. The risk of both procedures were discussed with patient in detail.   All questions were answered.     We also discussed hypertension and effects on postoperative bleeding.        The following information was discussed with the Patient, but this is not an all-inclusive-other issues were also discussed with patient.     I also discussed the following with the patient. I discussed loss of umbilicus. I discussed scars. I discussed dogears. I discussed wound healing. I discussed skin necrosis. I also discussed that status post bariatric surgery that the skin has stretched beyond its limited and there is still there to be elasticity and deformity. We discussed fat necrosis. We discussed postoperative seromas. We discussed postoperative bleeding. Also discussed malposition of the umbilicus.        GENERAL INFORMATION  Panniculectomy is a surgical procedure to remove excess skin and fatty tissue from the lower abdomen wall. Panniculectomy does not treat muscle laxity of the upper abdomen. Obese individuals who intend to lose weight should postpone all forms of body contouring surgery until they have reached a stable weight.     There are a variety of different techniques used by plastic surgeons for panniculectomy. Panniculectomy can be combined with other forms of body-contouring surgery, including suction-assisted lipectomy, or performed at the same time with other elective surgeries.     ALTERNATIVE TREATMENTS  Alternative forms of management consist of not treating the areas of loose skin and fatty deposits. Liposuction may be a surgical alternative to if there is good skin tone and localized abdominal fatty. If you have lost your skin laxity as apparent by multiple stretch marks you will not be a good liposuction candidate. Diet and exercise programs may be of benefit in the overall reduction of excess body fat and contour improvement. Risks and potential complications are associated with alternative surgical forms of treatment.     RISKS OF PANNICULECTOMY SURGERY  Every surgical procedure involves a certain amount of risk and it is important that you understand these risks and the possible complications associated with them.   In addition, every procedure has limitations. An individual's choice to undergo a surgical procedure is based on the comparison of the risk to potential benefit. Although the majority of patients do not experience these complications, you should discuss each of them with your plastic surgeon to make sure you completely understand all possible consequences of a abdominoplasty/panniculectomy.     Bleeding- It is possible, though unusual, to experience a bleeding episode during or after surgery. There is blood in the pannus, and depending the size of your pannus you may be subjected to considerable blood loss. Should post-operative bleeding occur, it may require an emergency treatment to drain the accumulated blood or blood transfusion. Intra-operative blood transfusions may be required. Do not take any aspirin or anti-inflammatory medications for ten days before surgery, as this may increase the risk of bleeding. Non-prescription herbs and dietary supplements can increase the risk of surgical bleeding. Hematoma can occur at any time following injury. If blood transfusions are needed to treat blood loss, there is a risk of blood related infections such as hepatitis and the HIV (AIDS). Heparin medications that are used to prevent blood clots in veins can produce bleeding and decreased blood platelets.     Infection - Infection is can occur after this surgery. Should an infection occur, treatment including antibiotics, hospitalization, or additional surgery may be necessary. There is a greater risk of infection when body contouring procedures are performed in conjunction with abdominal surgical procedures.      Change in Skin Sensation- It is common to experience diminished (or loss) of skin sensation in areas that have had surgery.   Diminished (or complete loss of skin sensation) may not totally resolve after an abdominoplasty/pannicular      Skin Contour Irregularities- Contour and shape irregularities and depressions may occur after abdominoplasty. Visible and palpable wrinkling of skin can occur. Residual skin irregularities at the ends of the incisions or dog ears are always a possibility as is skin pleating when there is excessive redundant skin. This may improve with time, or it can be surgically corrected.     Major Wound Separation- Wounds may separate after surgery. Should this occur, additional treatment including surgery may be necessary.     Skin Discoloration / Swelling- Bruising and swelling normally occurs following panniculectomy. The skin in or near the surgical site can appear either lighter or darker than surrounding skin. Although uncommon, swelling and skin discoloration may persist for long periods of time and, in rare situations, may be permanent.       Skin Sensitivity- Itching, tenderness, or exaggerated responses to hot or cold temperatures may occur after surgery. Usually this resolve during healing, but in some situations it may be chronic.     Sutures- Most surgical techniques use deep sutures. You may notice these sutures after your surgery. Sutures may spontaneously poke through the skin, become visible or produce irritation that requires removal.     Damage to Deeper Structures- There is the potential for injury to deeper structures including nerves, blood vessels, muscles, and lungs (pneumothorax), or intra-abdominal injury can occure during any surgical procedure. The potential for this to occur varies according to the type of procedure being performed. Injury to deeper structures may be temporary or permanent.     Fat Necrosis- Fatty tissue found deep in the skin might die. This may produce areas of firmness within the skin. Additional surgery to remove areas of fat necrosis may be necessary. There is the possibility of contour irregularities in the skin that may result from fat necrosis.     Obesity: If you're BMI is greater than 30 you may have a higher chance of complications. This may include but not limited to wound healing and infections. Also if you have other medical problems such as diabetes and hypertension it may effect your healing as well as your surgical results in bleeding.     Umbilicus- Malposition, scarring, unacceptable appearance or loss of the umbilicus (navel) may occur. You may lose the umbilicus when this is combined with a hernia repairs, or due to the strech of the skin the umbilicus has stretched out so much that it can not be salvaged     Pubic Distortion- There will be distortion of their labia and pubic area. Additional treatment including surgery may be necessary. Even with additional surgery she may never have symmetry. At times you may have painful intercourse.     Scarring-  All surgery leaves scars, some more visible than others. This surgery will leave large scars. Abnormal scars may occur within the skin and deeper tissues depending on your healing. Scars may be unattractive and of different color than surrounding skin. Scar appearance may also vary within the same scar, exhibit contour variations or \"bunching\" due to the amount of excess skin. Scars may be asymmetrical (appear different between right and left side of the body). There is the possibility of visible marks in the skin from sutures. In some cases scars may require surgical revision or treatment.     Surgical Anesthesia- Both local and general anesthesia involve risk. There is the possibility of complications, injury, and even death from all forms of surgical anesthesia or sedation     Asymmetry-  Symmetrical body appearance may not result from panniculectomy. Factors such as skin tone, fatty deposits, skeletal prominence, and muscle tone may contribute to normal asymmetry in body features.   Additional surgery may be necessary to attempt to attempt to improve asymmetry.     Allergic Reactions- In some cases, local allergies to tape, suture material and glues, blood products, topical panniculectomy. There may be numbness that can occur after a panniculectomy this could be temporary or permanent     Unsatisfactory Result- There is no guarantee or warranty expressed or implied, on the results that may be obtained. You may be disappointed with the results of panniculectomy surgery. This would include risks such as asymmetry, unsatisfactory or highly visible surgical scar location, unacceptable visible deformities, bunching and rippling in the skin near the suture lines or at the ends of the incisions (dog ears), poor healing, wound disruption, and loss of sensation. It may not be possible to correct or improve the effects of surgical scars. In some situations, it may not be possible to achieve optimal results with a single surgical procedure. Additional surgery may be required to improve results.     Deep Venous Thrombosis, Cardiac and Pulmonary Complications- Surgery, especially longer procedures, may be associated with the formation of, or increase in, blood clots in the venous system. Pulmonary complications may occur secondarily to both blood clots (pulmonary emboli), fat deposits (fat emboli) or partial collapse of the lungs after general anesthesia. Pulmonary and fat emboli can be life-threatening or fatal in some circumstances. Air travel, inactivity and other conditions may increase the incidence of blood clots traveling to the lungs causing a major blood clot that may result in death. It is important to discuss with your physician any past history of blood clots, swollen legs or the use of estrogen or birth control pills that may contribute to this condition. Cardiac complications are a risk with any surgery and anesthesia, even in patients without symptoms. Should any of these complications occur, you may require hospitalization and additional treatment.   If you experience shortness of breath, chest pains, or unusual heart beats, seek medical attention immediately.      ADDITIONAL ADVISORIES     Long-Term Results- Subsequent alterations in the appearance of your body may occur as the result of aging, sun exposure, weight loss, weight gain, pregnancy, menopause or other circumstances not related to your surgery.     Metabolic Status of Massive Weight Loss Patients- Your personal metabolic status of blood chemistry and protein levels may be abnormal following massive weight loss and surgical procedures to make a patient loose weight. Individuals with abnormalities may be a risk for serious medical and surgical complications, including delayed wound healing, infection or even in rare cases, death.     Body-Piercing Procedures- Individuals who currently wear body-piercing jewelry or are seeking to undergo body-piercing procedures must consider the possibility that an infection could develop anytime following this procedure. Treatment including antibiotics, hospitalization or additional surgery may be necessary.            Intimate Relations After Surgery- Surgery involves coagulating of blood vessels and increased activity of any kind may open these vessels leading to a bleed, or hematoma. Increased activity that increased your pulse or heart rate may cause additional bruising, swelling, and the need for return to surgery and control bleeding. It is wise to refrain from sexual activity until your physician states it is safe.     Medications-  There are many adverse reactions that occur as the result of taking over-the-counter, herbal, and/or prescription medications. Be sure to check with your physician about any drug interactions that may exist with medications that you are already taking. If you have an adverse reaction, stop the drugs immediately and call your plastic surgeon for further instructions. If the reaction is severe, go immediately to the nearest emergency room.   When taking the prescribed pain medications after surgery, realize that they can affect your thought process and coordination. Do not drive, do not operate complex equipment, do not make any important decisions and do not drink any alcohol while taking these medications. Be sure to take your prescribed medication only as directed. If at blood thinners such as aspirin and Coumadin or Plavix etc. Is prescribed by a physician you must consult with the prescribing physician prior to stopping any of the blood thinners.     Our focus is improvement rather than perfection. Complications or less than satisfactory results are sometimes unavoidable, may require additional surgery and often are stressful.      Smoking, Second-Hand Smoke Exposure, Nicotine Products (Patch, Gum, Nasal Spray)-   Patients who are currently smoking, use tobacco products, or nicotine products (patch, gum, or nasal spray) are at a greater risk for significant surgical complications of skin dying, delayed healing, and additional scarring. Individuals exposed to second-hand smoke are also at potential risk for similar complications attributable to nicotine exposure. Additionally, smokers may have a significant negative effect on anesthesia and recovery from anesthesia, with coughing and possibly increased bleeding. Individuals who are not exposed to tobacco smoke or nicotine-containing products have a significantly lower risk of this type of complication.      It is important to refrain from smoking at least 6-8 weeks before surgery and I do not smoke for 8 weeks to 3 months after surgery.     Post-bariatric patients: It is imperative that quit smoking at least 6-8 weeks before undergoing this procedure as it will adversely affect your outcome. ADDITIONAL SURGERY NECESSARY (RE-OPERATIONS)  There are many variable conditions that may influence the long-term result of surgery. Should complications occur, additional surgery or other treatments may be necessary. Secondary surgery may be necessary to obtain optimal results.  Risks and complications can occur with any surgery, the risks cited are particularly associated with abdominoplasty/panniculectomy. Other complications and risks can occur but are even more uncommon. The practice of medicine and surgery is not an exact science. There is no guarantee or warranty expressed or implied, on the results that may be obtained. In some situations, it may not be possible to achieve optimal results with a single surgical procedure or even a multiple procedure.       PATIENT COMPLIANCE   Follow all physician instructions carefully; this is essential for the success of your outcome. It is important that the surgical incisions are not subjected to excessive force, swelling, abrasion, or motion during the time of healing. Personal and vocational activity needs to be restricted. Protective dressings and drains should not be removed unless instructed by me. Successful post-operative function depends on both surgery and subsequent care. Physical activity that increases your pulse or heart rate may cause bruising, swelling, fluid accumulation and the need for return to surgery. It is wise to refrain from intimate physical activities after surgery until your physician states it is safe. It is important that you participate in follow-up care, return for aftercare, and promote your recovery after surgery.       FINANCIAL RESPONSIBILITIES  The cost of surgery involves several charges for the services provided. The total includes fees charged by your surgeon, the cost of surgical supplies, anesthesia, laboratory tests, and possible hospital charges, depending on where the surgery is performed. Depending on whether the cost of surgery is covered by an insurance plan, you will be responsible for necessary co-payments, deductibles, and charges not covered.   The fees charged for this procedure do not include any potential future costs for additional procedures that you elect to have or require in order to revise, optimize, or complete your outcome. Additional costs may occur should complications develop from the surgery. Secondary surgery or hospital day-surgery charges involved with revision surgery will also be your responsibility. HEALTH INSURANCE  Most health insurance companies exclude coverage for cosmetic surgical operations any complications that might occur from surgery. Please carefully review your health insurance subscriber-information pamphlet or contact your insurance company for a detailed explanation of their policies for covering abdominoplasty/panniculectomy procedures. Most insurance plans may exclude coverage for secondary or revisionary surgery. ASPS consent abdominoplasty     GENERAL INFORMATION  Abdominoplasty is a surgical procedure to remove excess skin and fatty tissue from the middle and lower abdomen and to tighten muscles of the abdominal wall. Abdominoplasty is not a surgical treatment for being overweight. Weight changes will affect your body contouring results. You should not have body contouring until you have reached a stable weight.     ALTERNATIVE TREATMENTS  Alternative forms of management consist of not treating the areas of loose skin and fatty deposits. Liposuction may be a surgical alternative to abdominoplasty if there is good skin tone and localized abdominal fatty deposits in an individual of normal weight. Diet and exercise programs may be of benefit in the overall reduction of excess body fat and contour improvement. Risks and potential complications are also associated with alternative surgical forms of treatment. INHERENT RISKS OF ABDOMINOPLASTY SURGERY  Every surgical procedure involves a certain amount of risk and it is important that you understand these risks and the possible complications associated with them. In addition, every procedure has limitations.  An individual's choice to undergo a surgical procedure is based on the comparison of the risk to potential benefit. SPECIFIC RISKS OF ABDOMINOPLASTY SURGERY  Change in Skin Sensation: It is common to experience diminished (or loss) of skin sensation in areas that have had surgery. Diminished (or complete loss of skin sensation) may not totally resolve after an abdominoplasty. Skin Contour Irregularities:  Contour and shape irregularities and depressions may occur after abdominoplasty. Visible and palpable wrinkling of skin can occur. Residual skin irregularities at the ends of the incisions or dog ears are always a possibility as is skin pleating when there is excessive redundant skin. This may improve with time, or it can be surgically corrected. Major Wound Separation:  Wounds may separate after surgery. Should this occur, additional treatment including surgery may be necessary. Umbilicus: Malposition, scarring, unacceptable appearance or loss of the umbilicus (navel) may occur. Pubic Distortion: It is possible, though unusual, for women to develop distortion of their labia and pubic area. Should this occur, additional treatment including surgery may be necessary.     Use of Platelet Gel or Fibrin Sealants Tissue Glue:  Platelet Gel (from your blood) and Fibrin sealants (from heat-treated human blood components to  inactivate virus transmission) may be used to hold tissue layers together at surgery and to diminish postoperative bruising following an abdominoplasty. Sealants have been carefully produced from screened donor blood plasma for hepatitis, syphilis, and human immunodeficiency virus (HIV). These products  have been used safely for many years as sealants in cardiovascular and general surgery. This product is thought to be of help in diminishing surgical bleeding and by adhering layers of tissue together. GENERAL RISKS OF SURGERY  Healing Issues:  Certain medical conditions, dietary supplements and medications may delay and interfere with healing.  Patients with massive weight loss may have a healing delay that could result in the incisions coming apart, infection, and tissue changes resulting in the need for additional medical care, surgery, and prolonged hospitalizations. Patients with diabetes or those taking medications such as steroids on an extended basis may have prolonged healing issues. Smoking will cause a delay in the healing process, often resulting in the need for additional surgery. There are general risks associated with healing such as swelling, bleeding, possibility of additional surgery, prolonged recovery, color changes, shape changes, infection, not meeting your goals and expectations, and added expense to the patient. There may also be a longer recovery due to the length of surgery and anesthesia. Patients with significant skin laxity (patients seeking facelifts, breast lifts, abdominoplasty, and body lifts) will continue to have the same lax  skin after surgery. The quality or elasticity of skin will not change, and recurrence of skin looseness will occur at some time in the future, quicker for some than others. There are nerve endings that may become involved with healing scars from surgery such as suction-assisted lipectomy, abdominoplasty, facelifts, body lifts, and extremity surgery. While there may not be a major nerve injury, the small nerve endings during the healing period may become too active producing a painful or oversensitive area due to the small sensory nerve involved with scar tissue. Often, massage and early non-surgical intervention resolves this. It is important to discuss post-surgical pain with your surgeon. Bleeding: It is possible, to experience a bleeding episode during or after surgery. Should postoperative  bleeding occur, it may require emergency treatment to drain accumulated blood or you may  require a blood transfusion. Increased activity too soon after surgery  can lead to increased chance of bleeding and additional surgery.  It is important to follow postoperative instructions and limit exercise and strenuous activity for the instructed time. Do not take any aspirin or anti-inflammatory medications for at least ten days before or after surgery, as this may increase the risk of bleeding. Non-prescription herbs and dietary supplements can increase the risk of surgical bleeding. Hematoma can occur at any time, usually in the first three weeks following injury to the operative area. If blood transfusions are necessary to treat blood loss, there is the risk of blood-related infections such as hepatitis and HIV (AIDS). Heparin medications that are used to prevent blood clots in veins can produce bleeding and decreased blood platelets. Infection:  Infection can occur after surgery. Should an infection occur, additional treatment including antibiotics, hospitalization, or additional surgery may be necessary. It is important to tell your surgeon of any other infections, such as ingrown toenail, insect bite, or urinary tract infection. Remote infections, infection in other part of the body, may lead to an infection in the operated area. Scarring: All surgery leaves scars, some more visible than others. Although wound healing after a surgical  procedure is expected, abnormal scars may occur within the skin and deeper tissues. Scars may be unattractive and of different color than the surrounding skin tone. Scar appearance may also vary within the same scar. Scars may be asymmetrical (appear different on the right and left side of the body). There is the possibility of visible marks in the skin from sutures. In some cases, scars may require surgical revision or treatment.     Firmness:  Excessive firmness can occur after surgery due to internal scarring. The occurrence of this is not  predictable. Additional treatment including surgery may be necessary. Change in Skin Sensation:   It is common to experience diminished (or loss) of skin sensation in areas that have had surgery. Diminished (or complete loss of skin sensation) may not totally resolve. Skin Contour Irregularities:  Contour and shape irregularities may occur. Visible and palpable wrinkling of skin may occur. Residual  skin irregularities at the ends of the incisions or dog ears are always a possibility when there is  excessive redundant skin. This may improve with time, or it can be surgically corrected. Skin Discoloration / Swelling:  Some bruising and swelling will normally occur. The skin in or near the surgical site can appear either lighter or darker than surrounding skin. Although uncommon, swelling and skin discoloration may persist for long periods of time and, in rare situations, may be permanent. Skin Sensitivity:  Itching, tenderness, or exaggerated responses to hot or cold temperatures may occur after surgery. Usually this resolves during healing, but in some situations it may be chronic. Major Wound Separation:  Wounds may separate after surgery. Should this occur, additional treatment including surgery may be necessary. Sutures:  Most surgical techniques use deep sutures. You may notice these sutures after your surgery. Sutures may spontaneously poke through the skin, become visible or produce irritation that requires suture removal.  Delayed Healing:  Wound disruption or delayed wound healing is possible. Some areas of the skin may not heal normally and may take a long time to heal. Areas of skin may die. This may require frequent dressing changes or further surgery to remove the non-healed tissue. Individuals who have decreased blood supply to tissue from past surgery or radiation therapy may be at increased risk for wound healing and poor surgical outcome. Smokers have a greater risk of skin loss and wound healing complications. Damage to Deeper Structures:   There is the potential for injury to deeper structures including nerves, blood vessels, muscles, and lungs (pneumothorax) during any surgical procedure. The potential for this to occur varies according to the type of procedure being performed. Injury to deeper structures may be temporary or permanent. Fat Necrosis:  Fatty tissue found deep in the skin might die. This may produce areas of firmness within the skin. Additional surgery to remove areas of fat necrosis may be necessary. There is the possibility of contour irregularities in the skin that may result from fat necrosis. Seroma:  Fluid may accumulate between the skin and the underlying tissues following surgery, trauma  or vigorous exercise. Should this problem occur, it may require additional procedures for drainage of fluid. Surgical Anesthesia:  Both local and general anesthesia involves risk. There is the possibility of complications, injury, and even death from all forms of surgical anesthesia or sedation.     Shock: In rare circumstances, your surgical procedure can cause severe trauma, particularly when multiple or extensive procedures are performed. Although serious complications can occur infections or excessive fluid loss can lead to severe illness and even death. If surgical shock occurs, hospitalization and additional treatment would be necessary. Pain:  You will experience pain after your surgery. Pain of varying intensity and duration may occur and persist after surgery. Chronic pain may occur very infrequently from nerves becoming trapped in scar tissue or due to tissue stretching. Cardiac and Pulmonary Complications:  Pulmonary complications may occur secondarily to blood clots (pulmonary emboli), fat deposits (fat emboli) or partial collapse of the lungs after general anesthesia. Pulmonary emboli can be life threatening or fatal in some circumstances. Inactivity and other conditions may increase the incidence of blood clots traveling to the lungs causing a major blood clot that may result in death.  It is important to discuss if you have any past history of swelling in your legs or blood clots that may contribute to this condition. Cardiac complications are a risk with any surgery and anesthesia, even in patients without symptoms. If you experience shortness of breath, chest pains, or unusual heart beats, seek medical attention immediately. Should any of these complications occur, you may require hospitalization and additional treatment. Venous Thrombosis and Sequelae: Thrombosed veins, which resemble cords, occasionally develop in the area of the breast or around IV sites, and usually resolve without medical or surgical treatment. It is important to discuss with me surgeon any birth control pills you are taking. Certain high estrogen pills may increase your risk of thrombosed veins. Allergic Reactions:  In Some cases, local allergies to tape, suture material and glues, blood products, topical preparations or injected agents have been reported. Serious systemic reactions including shock (anaphylaxis) may occur in response to drugs used during surgery and prescription medicines. Allergic reactions may require additional treatment. Drug Reactions:  Unexpected drug allergies, lack of proper response to medication, or illness caused by the prescribed drug are possibilities. It is important for you to inform your physician of any problems you have had with any medication or allergies to medication, prescribed or over the counter, as well as medications you now regularly take. Asymmetry:  Symmetrical body appearance may not result after surgery. Factors such as skin tone, fatty deposits, skeletal prominence, and muscle tone may contribute to normal asymmetry in body features. Most patients have differences between the right and left side of their bodies before any surgery is performed. Additional surgery may be necessary to attempt to diminish asymmetry. Surgical Wetting Solutions:   There is the possibility that large volumes of fluid containing dilute local anesthetic drugs and epinephrine that is injected into fatty deposits during surgery may contribute to fluid overload or systemic reaction to these medications. Additional treatment including hospitalization may be necessary. Persistent Swelling (Lymphedema):  Persistent swelling can occur following surgery. Unsatisfactory Result:  Although results are expected, there is no guarantee or warranty expressed or implied, on the  results that may be obtained. The body is not asymmetric and almost everyone has some degree of unevenness which may not be recognized in advance. One side of the face may be slightly larger, one side of the face droopier. The breast and trunk area exhibit the same possibilities. Many of such issues cannot be fully corrected with surgery. The more realistic your expectations as to results, the better your results will be in your eye. Some patients never achieve their desired goals or results, at no fault of the surgeon or surgery. You may be disappointed with the results of surgery. Asymmetry, unanticipated shape and size, loss of function, wound disruption, poor healing, and loss of sensation may occur after surgery. Size may be incorrect. Unsatisfactory surgical scar location or appearance may occur. It may be  necessary to perform additional surgery to improve your results. ADDITIONAL ADVISORIES  Smoking, Second-Hand Smoke Exposure, Nicotine Products (Patch, Gum, Nasal Spray):  Patients who are currently smoking or use tobacco or nicotine products (patch, gum, or nasal spray) are at a greater risk for significant surgical complications of skin dying and delayed healing and additional scarring. Individuals exposed to second-hand smoke are also at potential risk for similar complications attributable to nicotine exposure. Additionally, smoking may have a significant negative effect on anesthesia and recovery from anesthesia, with coughing and possibly increased bleeding.  Individuals who are not exposed to tobacco smoke or nicotine-containing products have a significantly lower risk of   this type of complication. I acknowledge that I will inform my physician if I continue to smoke within this time frame, and understand that for my safety, the surgery, if possible, may be delayed. Smoking may have such a negative effect on your surgery that a urine test just before surgery may be done which will prove the presence of Nicotine. If positive, your surgery may be cancelled and your surgery,   Sleep Apnea / CPAP:   Individuals who have breathing disorders such as Obstructive Sleep Apnea and who may rely upon CPAP devices (constant positive airway pressure) or utilize nighttime oxygen are advised that they are at a substantive risk for respiratory arrest and death when they take narcotic pain medications following surgery. This is an important consideration when evaluating the safety of surgical procedures in terms of very serious complications, including death, that relate to pre-existing medical conditions. Surgery may be considered only with monitoring afterwards in a hospital setting in order to reduce risk of potential respiratory complications and to safely manage pain following surgery.      Medications and Herbal Dietary Supplements: There are potential adverse reactions that occur as the result of taking over the counter, herbal, and/or prescription medications. Aspirin and medications that contain aspirin interfere with bleeding. These include non-steroidal anti-inflammatories such as Motrin, Advil, and Aleve. It is very important not to stop drugs that interfere with platelets, such as Plavix, which is used after a stent. It is important if you have had a stent and are taking Plavix that you inform the plastic surgeon. Stopping Plavix may result in a heart attack, stroke and even death. Be sure to check with your prescribing physician prior to stopping any medications.   You may have drug interactions that may exist with medications which you are already taking. If you have an adverse reaction, stop the drugs immediately and call for instructions. If the reaction is severe, go immediately to the nearest emergency room. When taking the prescribed pain medications after surgery, realize that they can affect your thought process and coordination. Do not drive, do not operate complex equipment, do not make any important decisions and do not drink any alcohol while taking these medications. Be sure to take your prescribed medication only as directed. Sun Exposure  Direct or Tanning Salon:  The effects of the sun are damaging to the skin. Exposing the treated areas to sun may result in  increased scarring, color changes, and poor healing. Patients who tan, either outdoors or in a salon, should inform their surgeon and either delay treatment, or avoid tanning until the you are told that it is safe to resume. The damaging effect of sun exposure occurs even with the use sun block or clothing coverage. Travel Plans:  Any surgery holds the risk of complications that may delay healing and your return to normal life. Please let the surgeon know of any travel plans, important commitments already scheduled or planned, or time demands that are important to you, so that appropriate timing of surgery can occur. There are no guarantees that you will be able to resume all activities in the desired time frame. Long-Term Results:  Subsequent alterations in the appearance of your body may occur as the result of aging, sun exposure, weight loss, weight gain, pregnancy, menopause or other circumstances not related to your surgery. Body-Piercing Procedures:  Individuals who currently wear body-piercing jewelry in the surgical region are advised that an infection could develop from this activity.   .  Intimate Relations After Surgery:  Surgery involves coagulating of blood vessels and increased activity of any kind may open these vessels of DVT/PE may be almost as great as the prophylactic therapy when involving Aspirin, Heparin,  and Exoxaparin. Be aware that if your surgery is elective, those patients with very high risks should consider not proceeding with such elective surgery. ADDITIONAL SURGERY NECESSARY (Re-Operations)  There are many variable conditions that may influence the long-term result of surgery. It is unknown how your tissue may respond or how wound healing will occur after surgery. Secondary surgery may be necessary to perform additional tightening or repositioning of body structures. Should complications occur, additional surgery or other treatments may be necessary. Even though risks and complications occur infrequently, the risks cited are particularly associated with this surgery. Other complications and risks can occur but are even more uncommon. The practice of medicine and surgery is not an exact science. Although good results are expected, there is no guarantee or warranty expressed or implied, on the results that may be obtained. In some situations, it may not be possible to achieve optimal results with a single surgical procedure. You and your surgeon will discuss the options available should  additional surgery be advised. There may be additional costs and expenses for such additional  procedures, including surgical fees, facility and anesthesia fees, pathology and lab testing. PATIENT COMPLIANCE  Follow all physician instructions carefully; this is essential for the success of your outcome. It is important that the surgical incisions are not subjected to excessive force, swelling, abrasion, or motion during the time of healing. Personal and vocational activity needs to be restricted. Protective dressings and drains should not be removed unless instructed. Successful post-operative function depends on both surgery and subsequent care.  Physical activity that increases your pulse or heart rate may cause bruising, swelling, fluid accumulation and the need for return to surgery. It is wise to refrain from intimate physical activities after surgery until your physician states it is safe. It is important that you participate in follow-up care, return for aftercare, and promote your recovery after surgery. REVISION POLICY  Surgical revision surgery is a common part of elective surgery. Your procedure will not stop you from aging, sagging, scarring, or experiencing ongoing skin changes that are more genetically controlled. If revision surgery is either desired or advisable within one year after the initial surgery, there may be a Fees associated with it. HEALTH INSURANCE  Most health insurance companies exclude coverage for cosmetic surgical operations or any resulting complications. Please carefully review your health insurance subscriber-information pamphlet. Most insurance plans exclude coverage for secondary or revisionary surgery due to complications of cosmetic surgery. It is unethical and fraudulent to bill insurance for cosmetic procedures. We cannot participate in such activities. FINANCIAL RESPONSIBILITIES   The cost of surgery involves several charges for the services provided. The total includes fees charged by your surgeon, the cost of surgical supplies, anesthesia, laboratory tests, and possible outpatient hospital charges, depending on where the surgery is performed. Depending on whether the cost of surgery is covered by an insurance plan, you will be responsible for necessary co-payments, deductibles, and charges not covered. The fees charged for this procedure do not include any potential future costs for additional procedures that you elect to have or require in order to revise, optimize, or complete your outcome. Additional costs may occur should complications develop from the surgery. Secondary surgery or hospital day-surgery charges involved with revision surgery will also be your responsibility.  In signing the consent for this surgery/procedure, you acknowledge that you have been informed about its risk and consequences and accept responsibility for the clinical decisions that were made along with the financial costs of all future treatments.        Electronically signed by Stef Gamez MD on 4/1/2021 at 8:08 AM

## 2021-04-01 NOTE — OP NOTE
Operative Note          Specimens:   ID Type Source Tests Collected by Time Destination   A : PANNUS Tissue Abdomen SURGICAL PATHOLOGY Gabriel Trent MD 2021 1045        Implants:  * No implants in log *      Drains:   Closed/Suction Drain Left LLQ Bulb 19 Colombian (Active)   Site Description Unable to view 21 1137   Dressing Status Clean;Dry; Intact 21 1137   Drainage Appearance Bloody;Bright red 21 1137   Status To bulb suction 21 1137       Closed/Suction Drain Right RLQ Bulb 19 Colombian (Active)   Site Description Unable to view 21 1137   Dressing Status Clean;Dry; Intact 21 1137   Drainage Appearance Bloody;Bright red 21 113   Status To bulb suction 21 1137         [unfilled]        Surgery Note     Name: Sadiq Mcgregor Date/Time of Admission: 2021  5:41 AM   MRN: [id number] Attending Provider: Gabriel Trent MD   Room/Bed: [facility] OR [facility]/NONE /Age: 1980/40 y.o. Date of Surgery: [unfilled] Surgeon: Gabriel Trent MD   Facility: [facility] PCP: Barry Kilpatrick MD     Pre-Op DX:   Pannus with panniculitis. Post-Op DX:   Pannus with panniculitis     Operative Procedure:   1. Panniculectomy and abdominoplasty    ANESTHESIA:  General.     INTRAVENOUS FLUIDS:  As per Anesthesia. ESTIMATED BLOOD LOSS:  As per Anesthesia. SPECIMENS: none pannus wt 2568 gram    DRAINS:  Two #19 Blakes,     INDICATION FOR PROCEDURE:  The patient is 45 y.o. female . All the risks, benefits, and alternative treatments including but not limited to infection, bleeding, loss of umbilicus, wound healing issues, need for blood transfusion, and increased risk for all surgical aspect due to her multiple medical problems were explained to the patient and an informed consent was obtained. DESCRIPTION OF PROCEDURE:  The patient was marked in the holding area. The midlines were marked. The amount of resection was also marked. Procedure:   The patient was brought into the room. SCDs were placed and turned on prior to induction of anesthesia via endotracheal intubation. Then all areas were prepped and draped in usual customary sterile manner. After all the areas were prepped and draped in usual customary sterile manner, a time-out was performed and everybody in the room was in agreement with the time-out. Then, an incision was made over the inferior border of the incision in the horizontal manner. This was done using #10 blade, then a cautery was used, and dissection was continued in the following manner: To the subcutaneous tissue through the Domenica's to the anterior abdominal wall. Then, the dissection was continued cephalad until the superior markings were made on the right and the left of the umbilicus. Then an incision was made circumferentially around the umbilicus. This was done using a #15 blade. Then dissection was made circumferentially around the umbilicus all the way down to the anterior abdominal wall. Then Dr. Chasidy Salgado came in and repaired the hernia. This will be dictated separately. Then dissection was continued cephalad to the rib cages bilaterally and down xiphoid centrally. The excess skin was excised. Then it was noted there was a rectus diastasis present. The rectus diastases was marked. Then the area was irrigated. All bleeding points were identified were cauterized. Then using an #0 looped PDS the superior part was imbricated to the umbilicus. Then an 3-0 Vicryl was placed at the 12:00 position of the umbilicus. Then a #0 looped PDS was used. The area in the infraumbilical area was imbricated. Then #19 Blakes were placed and brought out through a separate incision. Then the position of the umbilicus was marked. Then the suture line was stapled. Then on 2-0 Vicryl was used in an interrupted manner and after 2-0 Vicryl was placed in an interrupted manner in domenica's fascia.   Then 2-0 Vicryl was used in an interrupted manner in the deep tissue. Then 2-0 [facility] was used in the deep dermal.  The umbilicus was brought out through the previously marked area. It was sutured in place using 3-0 Monocryl in the deep tissue. Then [address] was used on the skin in the periumbilical area. 2-0 Prolene was also used on the drain site. The Mastisol and Steri-Strips were placed*. Then ABDs were placed and a abdominal binder was placed. The patient tolerated procedure well. Patient was transferred to recovery room in stable condition.     Electronically signed by Autumn Aden MD on 4/1/2021 at 11:44 AM

## 2021-04-02 LAB — SURGICAL PATHOLOGY REPORT: NORMAL

## 2021-04-05 DIAGNOSIS — G89.18 POST-OP PAIN: Primary | ICD-10-CM

## 2021-04-05 RX ORDER — LIDOCAINE 50 MG/G
1 PATCH TOPICAL DAILY
Qty: 10 PATCH | Refills: 0 | Status: SHIPPED | OUTPATIENT
Start: 2021-04-05 | End: 2021-04-15

## 2021-04-07 ENCOUNTER — OFFICE VISIT (OUTPATIENT)
Dept: SURGERY | Age: 41
End: 2021-04-07

## 2021-04-07 VITALS
OXYGEN SATURATION: 98 % | HEIGHT: 58 IN | SYSTOLIC BLOOD PRESSURE: 115 MMHG | BODY MASS INDEX: 36.23 KG/M2 | DIASTOLIC BLOOD PRESSURE: 66 MMHG | HEART RATE: 77 BPM | WEIGHT: 172.6 LBS

## 2021-04-07 DIAGNOSIS — Z98.890 POSTOPERATIVE STATE: Primary | ICD-10-CM

## 2021-04-07 PROCEDURE — 99024 POSTOP FOLLOW-UP VISIT: CPT | Performed by: PLASTIC SURGERY

## 2021-04-07 NOTE — PROGRESS NOTES
[address] [place] [address] SURGICAL SPECIALISTS  [facility] [address]       OFFICE POST-OP NOTE    Patient Name:  Elicia Ruiz    :  1980    MRN:  [id number]  STATUS POST  Chief Complaint   Patient presents with    Post-Op Check     s/p Panniculectomy DOS 21       SUBJECTIVE  Patient seen and examined. Patient is status post panniculectomy and abdominoplasty on 2021. Patient is here for follow-up. Her drains have been putting out 5 and 10 cc respectively on the right and the left for the past 3 days. Patient states that she has not been stripping the drains. Patient does have discomfort. PHYSICAL EXAM  Vital Signs:  Pulse 77   Ht 4' 10\" (1.473 m)   Wt 172 lb 9.6 oz (78.3 kg)   SpO2 98%   BMI 36.07 kg/m²     Incisions:  Suture line clean dry and intact. The umbilicus is dusky. Skin:  No evidence of infection. Neurologic:  Alert & oriented x 3. ASSESSMENT   Diagnosis Orders   1. Postoperative state         PLAN  1. Continue binder. 2.  Continue baclofen. 3.  We will remove the bilateral drains. 4.  I discussed with the patient that she may lose her umbilicus. 5.  We will place Xeroform and umbilicus. Plan discussed with patient.     Electronically signed by:  Yuriy Castillo M.D.   2021

## 2021-04-09 ENCOUNTER — TELEPHONE (OUTPATIENT)
Dept: SURGERY | Age: 41
End: 2021-04-09

## 2021-04-12 ENCOUNTER — TELEPHONE (OUTPATIENT)
Dept: SURGERY | Age: 41
End: 2021-04-12

## 2021-04-12 NOTE — TELEPHONE ENCOUNTER
She can place Neosporin with lidocaine in it from over-the-counter patches for the pain around her umbilicus. I am not sure which bulge she is referring to she needs to be evaluated in the office on Wednesday. If she feels there is a bulge present she can massage the area.

## 2021-04-12 NOTE — TELEPHONE ENCOUNTER
Patient sent a patient advice message through [address] She states that she is just checking in. When she takes her binder off, she has pain around her belly button. She also states that she wants to know if there is anything that can be done about the large bulge that is left. She is still wearing her binder 24/7 and sleeping in a recliner. She also has a follow-up 4/14. Are these issues that should be addressed at her follow-up or is the anything you would like me to relay to her today?

## 2021-04-14 ENCOUNTER — OFFICE VISIT (OUTPATIENT)
Dept: SURGERY | Age: 41
End: 2021-04-14

## 2021-04-14 VITALS
DIASTOLIC BLOOD PRESSURE: 74 MMHG | WEIGHT: 173.4 LBS | OXYGEN SATURATION: 99 % | SYSTOLIC BLOOD PRESSURE: 123 MMHG | HEART RATE: 69 BPM | HEIGHT: 57 IN | BODY MASS INDEX: 37.41 KG/M2

## 2021-04-14 DIAGNOSIS — Z09 POSTOPERATIVE EXAMINATION: Primary | ICD-10-CM

## 2021-04-14 PROCEDURE — 99024 POSTOP FOLLOW-UP VISIT: CPT | Performed by: PLASTIC SURGERY

## 2021-04-14 NOTE — PROGRESS NOTES
[address] [place] [address] SURGICAL SPECIALISTS  [facility] [address]       OFFICE POST-OP NOTE    Patient Name:  Constance Noel    :  1980    MRN:  [id number]  STATUS POST  Chief Complaint   Patient presents with    Post-Op Check     Patient states she is here for a 2 week post-op. She states she is having pain around her belly button and is concerned about a bulge. SUBJECTIVE  Patient seen and examined. Patient is status post panniculectomy and abdominoplasty on 2021. Patient complains of periumbilical pain. PHYSICAL EXAM  Vital Signs:  /74 (Site: Left Upper Arm, Position: Sitting, Cuff Size: Medium Adult)   Pulse 69   Ht 4' 9\" (1.448 m)   Wt 173 lb 6.4 oz (78.7 kg)   SpO2 99%   BMI 37.52 kg/m²     Incisions:  Suture line clean dry and intact. There is epidermal lysis, of the umbilicus. Skin:  No evidence of infection. Neurologic:  Alert & oriented x 3. ASSESSMENT   Diagnosis Orders   1. Postoperative examination         PLAN  1. Continue binder. 2.  Continue baclofen. 3.  I discussed with the patient that she may lose her umbilicus. 4.  We will place Xeroform and umbilicus. 5.  Recommend massaging the area. Plan discussed with patient.     Electronically signed by:  Sergio Kennedy M.D.   2021

## 2021-04-16 ENCOUNTER — TELEPHONE (OUTPATIENT)
Dept: SURGERY | Age: 41
End: 2021-04-16

## 2021-04-16 NOTE — TELEPHONE ENCOUNTER
Relayed information to patient via [employer]. I am waiting to see what she says about her job and job duties.

## 2021-04-16 NOTE — TELEPHONE ENCOUNTER
She may return to work. However she needs to be cautious. She needs to listen to her body anything that hurts she should not do it because it can cause tearing and shearing. She needs to wear her binder at all times except to remove to shower.

## 2021-04-16 NOTE — TELEPHONE ENCOUNTER
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 She says she really doesn't lift anything other than folding a shirt. She also states that she has some learning videos through her employer that she has to do when she goes back.

## 2021-04-16 NOTE — TELEPHONE ENCOUNTER
I strongly discouraged her from sitting at a computer and doing nothing patient needs to be up ambulating, not just sitting around will increase her chances of having clots. As far as returning to work we will need to find out what type of job she has and what her duties are.

## 2021-04-21 ENCOUNTER — OFFICE VISIT (OUTPATIENT)
Dept: SURGERY | Age: 41
End: 2021-04-21

## 2021-04-21 VITALS
HEIGHT: 57 IN | HEART RATE: 75 BPM | OXYGEN SATURATION: 96 % | WEIGHT: 166 LBS | DIASTOLIC BLOOD PRESSURE: 80 MMHG | BODY MASS INDEX: 35.81 KG/M2 | SYSTOLIC BLOOD PRESSURE: 124 MMHG

## 2021-04-21 DIAGNOSIS — Z09 POSTOPERATIVE EXAMINATION: Primary | ICD-10-CM

## 2021-04-21 PROCEDURE — 99024 POSTOP FOLLOW-UP VISIT: CPT | Performed by: PLASTIC SURGERY

## 2021-04-21 NOTE — PROGRESS NOTES
[address] [facility] SURGICAL SPECIALISTS  [facility] [address]       OFFICE POST-OP NOTE    Patient Name:  Deena Chandler    :  1980    MRN:  [id number]  STATUS POST  Chief Complaint   Patient presents with    Post-Op Check     Sx panni. Belly button has a smell to it. SUBJECTIVE  Patient seen and examined. Patient is status post panniculectomy and abdominoplasty on 2021. Patient complains of periumbilical pain. PHYSICAL EXAM  Vital Signs:  /80 (Site: Left Upper Arm, Position: Sitting, Cuff Size: Large Adult)   Pulse 75   Ht 4' 9\" (1.448 m)   Wt 166 lb (75.3 kg)   SpO2 96%   BMI 35.92 kg/m²     Incisions:  Suture line clean dry and intact. There is epidermal lysis, of the umbilicus. I debrided the nonviable tissue of the umbilicus. I discussed with the patient that we may need to debride at each visit. Skin:  No evidence of infection. Neurologic:  Alert & oriented x 3. ASSESSMENT   Diagnosis Orders   1. Postoperative examination         PLAN  1. Continue binder. 2.  Continue baclofen. 3.  I discussed with the patient that we will lose her umbilicus. 4.  We will place Betadine  5. Recommend massaging the area. 6.  Follow-up in a week  Plan discussed with patient.     Electronically signed by:  Sandie Duvall M.D.   2021

## 2021-04-21 NOTE — LETTER
[facility] Surgical Specialists  [address] [facility] [address]  Phone: [phone]  Fax: [phone]    Clara Fisher MD        April 21, 2021     Patient: Mell Luevano   YOB: 1980   Date of Visit: 4/21/2021       To Whom it May Concern:    Edgardo Montejo was seen in my clinic on 4/21/2021. She was accompanied by her , Gissell Sorto. If you have any questions or concerns, please don't hesitate to call.     Sincerely,         Clara Fisher MD

## 2021-05-05 ENCOUNTER — OFFICE VISIT (OUTPATIENT)
Dept: SURGERY | Age: 41
End: 2021-05-05

## 2021-05-05 VITALS
SYSTOLIC BLOOD PRESSURE: 132 MMHG | HEIGHT: 57 IN | HEART RATE: 67 BPM | WEIGHT: 166.2 LBS | BODY MASS INDEX: 35.86 KG/M2 | DIASTOLIC BLOOD PRESSURE: 89 MMHG | OXYGEN SATURATION: 97 %

## 2021-05-05 DIAGNOSIS — T14.8XXA OPEN WOUND: ICD-10-CM

## 2021-05-05 DIAGNOSIS — Z09 POSTOPERATIVE EXAMINATION: Primary | ICD-10-CM

## 2021-05-05 PROCEDURE — 99024 POSTOP FOLLOW-UP VISIT: CPT | Performed by: PLASTIC SURGERY

## 2021-05-05 NOTE — PROGRESS NOTES
[address] [facility] SURGICAL SPECIALISTS  [facility] [address]       OFFICE POST-OP NOTE    Patient Name:  Radha Felix    :  1980    MRN:  [id number]  STATUS POST  Chief Complaint   Patient presents with    Post-Op Check     Patient is here for a post-op panniculectomy. SUBJECTIVE  Patient seen and examined. Patient is status post panniculectomy and abdominoplasty on 2021. Patient complains of periumbilical pain. States that she return to work earlier than she was advised to. At work she was lifting heavy objects. PHYSICAL EXAM  Vital Signs:  /89 (Site: Left Upper Arm, Position: Sitting, Cuff Size: Medium Adult)   Pulse 67   Ht 4' 9\" (1.448 m)   Wt 166 lb 3.2 oz (75.4 kg)   SpO2 97%   BMI 35.97 kg/m²     Incisions: There is necrosis in the periumbilical area. I discussed with the patient that we need to debride this  Skin:  No evidence of infection. Neurologic:  Alert & oriented x 3. ASSESSMENT   Diagnosis Orders   1. Postoperative examination         PLAN  1. Continue binder. 2.  Continue wound care  3. I discussed with the patient that we will lose her umbilicus. 4.  We will place wet-to-dry. 5.  Recommend massaging the area. 6.  Follow-up in a week  Plan discussed with patient. Electronically signed by:  Tammie Love M.D.   2021         [facility] [address] [facility],[address]  [address] [place], [place] [id number]         Office Procedure Note     Name: Radha Felix Date/Time of Admission: No admission date for patient encounter. MRN: [id number] Attending Provider: No att. providers found   Room/Bed: @@ /Age: 1980/40 y.o. 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   Operative Procedure:   Debridement of the umbilicus to the subcutaneous tissue. It measures one 2 cm x 2.2 cm    ANESTHESIA:   None  SPECIMENS:    None    INDICATION FOR PROCEDURE:  The patient is a 45 y.o. female . All the risks, benefits, and alternative treatments were explained to the patient and an informed consent was obtained. DESCRIPTION OF PROCEDURE:  The patient was marked. A timeout was performed. then attention was turned to umbilicus. Then using pickups and scissors all nonviable tissue was debrided to visible margins. Hemostasis was achieved. Wet-to-dry dressing was placed. The patient tolerated procedure well. Postoperative instructions and follow-up appointment was provided.

## 2021-05-06 ENCOUNTER — TELEPHONE (OUTPATIENT)
Dept: SURGERY | Age: 41
End: 2021-05-06

## 2021-05-06 DIAGNOSIS — T14.8XXA OPEN WOUND: Primary | ICD-10-CM

## 2021-05-06 DIAGNOSIS — K42.9 UMBILICAL HERNIA WITHOUT OBSTRUCTION AND WITHOUT GANGRENE: ICD-10-CM

## 2021-05-06 NOTE — TELEPHONE ENCOUNTER
Pt sent a message through [employer] stating that Dr. Kellie Bingham said she would send a prescription in for sodium chloride. Medication pended.

## 2021-05-07 DIAGNOSIS — T14.8XXA OPEN WOUND: Primary | ICD-10-CM

## 2021-05-07 RX ORDER — SODIUM CHLORIDE 0.9 %
30 AEROSOL, SPRAY (ML) TOPICAL DAILY
Qty: 210 ML | Refills: 5 | Status: SHIPPED | OUTPATIENT
Start: 2021-05-07

## 2021-05-10 ENCOUNTER — HOSPITAL ENCOUNTER (OUTPATIENT)
Dept: ULTRASOUND IMAGING | Age: 41
Discharge: HOME | End: 2021-05-12
Payer: MEDICARE

## 2021-05-10 DIAGNOSIS — T14.8XXA OPEN WOUND: ICD-10-CM

## 2021-05-10 PROCEDURE — 76705 ECHO EXAM OF ABDOMEN: CPT

## 2021-05-11 ENCOUNTER — TELEPHONE (OUTPATIENT)
Dept: SURGERY | Age: 41
End: 2021-05-11

## 2021-05-12 ENCOUNTER — OFFICE VISIT (OUTPATIENT)
Dept: SURGERY | Age: 41
End: 2021-05-12

## 2021-05-12 VITALS
SYSTOLIC BLOOD PRESSURE: 110 MMHG | HEIGHT: 57 IN | OXYGEN SATURATION: 99 % | WEIGHT: 156.6 LBS | BODY MASS INDEX: 33.78 KG/M2 | DIASTOLIC BLOOD PRESSURE: 78 MMHG | HEART RATE: 68 BPM

## 2021-05-12 DIAGNOSIS — T88.8XXD FLUID COLLECTION AT SURGICAL SITE, SUBSEQUENT ENCOUNTER: Primary | ICD-10-CM

## 2021-05-12 DIAGNOSIS — Z09 POSTOPERATIVE EXAMINATION: ICD-10-CM

## 2021-05-12 PROCEDURE — 99024 POSTOP FOLLOW-UP VISIT: CPT | Performed by: PLASTIC SURGERY

## 2021-05-13 ENCOUNTER — TELEPHONE (OUTPATIENT)
Dept: INTERVENTIONAL RADIOLOGY/VASCULAR | Age: 41
End: 2021-05-13

## 2021-05-17 ENCOUNTER — TELEPHONE (OUTPATIENT)
Dept: INTERVENTIONAL RADIOLOGY/VASCULAR | Age: 41
End: 2021-05-17

## 2021-05-20 ENCOUNTER — TELEPHONE (OUTPATIENT)
Dept: SURGERY | Age: 41
End: 2021-05-20

## 2021-05-20 NOTE — TELEPHONE ENCOUNTER
Kiki @ [facility] called. She stated she was helping patient and noticed a new spot on the left abdominal incision that is about the size of a dime, purple, and feels \"squishy\". Is this something to be concerned about now, or should she wait until her next appt?

## 2021-05-20 NOTE — TELEPHONE ENCOUNTER
I called Haily Bird at [facility] and left  for her to call the office to discuss the patient's options. I also called and left  for patient to call office.

## 2021-05-20 NOTE — TELEPHONE ENCOUNTER
If the patient is concerned she can make an appointment at 9:00 at term tomorrow to be seen otherwise she can wait until her next appointment. It could just be a little blood blister.

## 2021-05-27 ENCOUNTER — PREP FOR PROCEDURE (OUTPATIENT)
Dept: GENERAL RADIOLOGY | Age: 41
End: 2021-05-27

## 2021-05-27 RX ORDER — SODIUM CHLORIDE 9 MG/ML
INJECTION, SOLUTION INTRAVENOUS CONTINUOUS
Status: CANCELLED | OUTPATIENT
Start: 2021-05-27

## 2021-06-24 ENCOUNTER — PATIENT MESSAGE (OUTPATIENT)
Dept: SURGERY | Age: 41
End: 2021-06-24

## 2021-06-24 NOTE — TELEPHONE ENCOUNTER
From: Janeth Judd  To: Kesha Glass MD  Sent: 6/24/2021 8:47 AM EDT  Subject: Non-Urgent Medical Question    Is there a way I can see her at the other office?  I'm having trouble finding a ride to [place]

## 2021-06-24 NOTE — TELEPHONE ENCOUNTER
Is there any way I can schedule this patient at the [facility]? She states she is having trouble finding a ride to [place]. I believe she ended up cancelling the last appt we scheduled for her out there.

## 2021-09-27 ENCOUNTER — APPOINTMENT (OUTPATIENT)
Dept: GENERAL RADIOLOGY | Age: 41
End: 2021-09-27
Payer: MEDICARE

## 2021-09-27 ENCOUNTER — HOSPITAL ENCOUNTER (EMERGENCY)
Age: 41
Discharge: HOME | End: 2021-09-27
Attending: EMERGENCY MEDICINE
Payer: MEDICARE

## 2021-09-27 VITALS
TEMPERATURE: 97.3 F | HEIGHT: 57 IN | OXYGEN SATURATION: 98 % | SYSTOLIC BLOOD PRESSURE: 139 MMHG | DIASTOLIC BLOOD PRESSURE: 91 MMHG | RESPIRATION RATE: 17 BRPM | WEIGHT: 168 LBS | HEART RATE: 55 BPM | BODY MASS INDEX: 36.24 KG/M2

## 2021-09-27 DIAGNOSIS — R07.9 CHEST PAIN, UNSPECIFIED TYPE: Primary | ICD-10-CM

## 2021-09-27 LAB
ABSOLUTE EOS #: 0.12 K/UL (ref 0–0.44)
ABSOLUTE IMMATURE GRANULOCYTE: <0.03 K/UL (ref 0–0.3)
ABSOLUTE LYMPH #: 1.24 K/UL (ref 1.1–3.7)
ABSOLUTE MONO #: 0.32 K/UL (ref 0.1–1.2)
ANION GAP SERPL CALCULATED.3IONS-SCNC: 9 MMOL/L (ref 9–17)
BASOPHILS # BLD: 1 % (ref 0–2)
BASOPHILS ABSOLUTE: 0.03 K/UL (ref 0–0.2)
BNP INTERPRETATION: NORMAL
BUN BLDV-MCNC: 9 MG/DL (ref 6–20)
BUN/CREAT BLD: ABNORMAL (ref 9–20)
CALCIUM SERPL-MCNC: 8.7 MG/DL (ref 8.6–10.4)
CHLORIDE BLD-SCNC: 105 MMOL/L (ref 98–107)
CO2: 22 MMOL/L (ref 20–31)
CREAT SERPL-MCNC: 0.43 MG/DL (ref 0.5–0.9)
DIFFERENTIAL TYPE: ABNORMAL
EOSINOPHILS RELATIVE PERCENT: 3 % (ref 1–4)
GFR AFRICAN AMERICAN: >60 ML/MIN
GFR NON-AFRICAN AMERICAN: >60 ML/MIN
GFR SERPL CREATININE-BSD FRML MDRD: ABNORMAL ML/MIN/{1.73_M2}
GFR SERPL CREATININE-BSD FRML MDRD: ABNORMAL ML/MIN/{1.73_M2}
GLUCOSE BLD-MCNC: 113 MG/DL (ref 70–99)
HCT VFR BLD CALC: 36.2 % (ref 36.3–47.1)
HEMOGLOBIN: 11.3 G/DL (ref 11.9–15.1)
IMMATURE GRANULOCYTES: 0 %
LYMPHOCYTES # BLD: 29 % (ref 24–43)
MCH RBC QN AUTO: 27.2 PG (ref 25.2–33.5)
MCHC RBC AUTO-ENTMCNC: 31.2 G/DL (ref 28.4–34.8)
MCV RBC AUTO: 87.2 FL (ref 82.6–102.9)
MONOCYTES # BLD: 7 % (ref 3–12)
NRBC AUTOMATED: 0 PER 100 WBC
PDW BLD-RTO: 14.9 % (ref 11.8–14.4)
PLATELET # BLD: 177 K/UL (ref 138–453)
PLATELET ESTIMATE: ABNORMAL
PMV BLD AUTO: 11.4 FL (ref 8.1–13.5)
POTASSIUM SERPL-SCNC: 4 MMOL/L (ref 3.7–5.3)
PRO-BNP: 79 PG/ML
RBC # BLD: 4.15 M/UL (ref 3.95–5.11)
RBC # BLD: ABNORMAL 10*6/UL
SEG NEUTROPHILS: 60 % (ref 36–65)
SEGMENTED NEUTROPHILS ABSOLUTE COUNT: 2.62 K/UL (ref 1.5–8.1)
SODIUM BLD-SCNC: 136 MMOL/L (ref 135–144)
TROPONIN INTERP: NORMAL
TROPONIN INTERP: NORMAL
TROPONIN T: NORMAL NG/ML
TROPONIN T: NORMAL NG/ML
TROPONIN, HIGH SENSITIVITY: <6 NG/L (ref 0–14)
TROPONIN, HIGH SENSITIVITY: <6 NG/L (ref 0–14)
WBC # BLD: 4.3 K/UL (ref 3.5–11.3)
WBC # BLD: ABNORMAL 10*3/UL

## 2021-09-27 PROCEDURE — 96375 TX/PRO/DX INJ NEW DRUG ADDON: CPT

## 2021-09-27 PROCEDURE — 99284 EMERGENCY DEPT VISIT MOD MDM: CPT

## 2021-09-27 PROCEDURE — 6360000002 HC RX W HCPCS: Performed by: STUDENT IN AN ORGANIZED HEALTH CARE EDUCATION/TRAINING PROGRAM

## 2021-09-27 PROCEDURE — 80048 BASIC METABOLIC PNL TOTAL CA: CPT

## 2021-09-27 PROCEDURE — 2580000003 HC RX 258: Performed by: STUDENT IN AN ORGANIZED HEALTH CARE EDUCATION/TRAINING PROGRAM

## 2021-09-27 PROCEDURE — 83880 ASSAY OF NATRIURETIC PEPTIDE: CPT

## 2021-09-27 PROCEDURE — 85025 COMPLETE CBC W/AUTO DIFF WBC: CPT

## 2021-09-27 PROCEDURE — 2500000003 HC RX 250 WO HCPCS: Performed by: STUDENT IN AN ORGANIZED HEALTH CARE EDUCATION/TRAINING PROGRAM

## 2021-09-27 PROCEDURE — 84484 ASSAY OF TROPONIN QUANT: CPT

## 2021-09-27 PROCEDURE — 93005 ELECTROCARDIOGRAM TRACING: CPT | Performed by: STUDENT IN AN ORGANIZED HEALTH CARE EDUCATION/TRAINING PROGRAM

## 2021-09-27 PROCEDURE — 96374 THER/PROPH/DIAG INJ IV PUSH: CPT

## 2021-09-27 PROCEDURE — 71046 X-RAY EXAM CHEST 2 VIEWS: CPT

## 2021-09-27 RX ORDER — SODIUM CHLORIDE, SODIUM LACTATE, POTASSIUM CHLORIDE, AND CALCIUM CHLORIDE .6; .31; .03; .02 G/100ML; G/100ML; G/100ML; G/100ML
1000 INJECTION, SOLUTION INTRAVENOUS ONCE
Status: COMPLETED | OUTPATIENT
Start: 2021-09-27 | End: 2021-09-27

## 2021-09-27 RX ORDER — ONDANSETRON 2 MG/ML
4 INJECTION INTRAMUSCULAR; INTRAVENOUS ONCE
Status: COMPLETED | OUTPATIENT
Start: 2021-09-27 | End: 2021-09-27

## 2021-09-27 RX ADMIN — ONDANSETRON 4 MG: 2 INJECTION INTRAMUSCULAR; INTRAVENOUS at 05:54

## 2021-09-27 RX ADMIN — SODIUM CHLORIDE, POTASSIUM CHLORIDE, SODIUM LACTATE AND CALCIUM CHLORIDE 1000 ML: 600; 310; 30; 20 INJECTION, SOLUTION INTRAVENOUS at 05:51

## 2021-09-27 RX ADMIN — FAMOTIDINE 20 MG: 10 INJECTION, SOLUTION INTRAVENOUS at 05:52

## 2021-09-27 ASSESSMENT — ENCOUNTER SYMPTOMS
FACIAL SWELLING: 0
CHEST TIGHTNESS: 1
SHORTNESS OF BREATH: 1
ABDOMINAL PAIN: 1
VOMITING: 0
NAUSEA: 1
COUGH: 0
BACK PAIN: 0

## 2021-09-27 ASSESSMENT — PAIN DESCRIPTION - LOCATION
LOCATION: HEAD;CHEST
LOCATION: CHEST

## 2021-09-27 ASSESSMENT — PAIN SCALES - GENERAL
PAINLEVEL_OUTOF10: 8
PAINLEVEL_OUTOF10: 7

## 2021-09-27 ASSESSMENT — PAIN DESCRIPTION - PAIN TYPE: TYPE: ACUTE PAIN

## 2021-09-27 ASSESSMENT — PAIN DESCRIPTION - DESCRIPTORS: DESCRIPTORS: SHARP

## 2021-09-27 ASSESSMENT — PAIN DESCRIPTION - DIRECTION: RADIATING_TOWARDS: SHOULDERS

## 2021-09-27 ASSESSMENT — PAIN DESCRIPTION - FREQUENCY: FREQUENCY: INTERMITTENT

## 2021-09-27 NOTE — ED PROVIDER NOTES
[facility]  ED  Emergency Department Encounter  EmergencyMedicine Resident     Pt Carrillo Rodriguez  MRN: [id number]  Sandragfed 1980  Date of evaluation: 9/27/21  PCP:  No primary care provider on file. This patient was evaluated in the Emergency Department for symptoms described in the history of present illness. The patient was evaluated in the context of the global COVID-19 pandemic, which necessitated consideration that the patient might be at risk for infection with the SARS-CoV-2 virus that causes COVID-19. Institutional protocols and algorithms that pertain to the evaluation of patients at risk for COVID-19 are in a state of rapid change based on information released by regulatory bodies including the CDC and federal and state organizations. These policies and algorithms were followed during the patient's care in the ED. CHIEF COMPLAINT       Chief Complaint   Patient presents with    Chest Pain       HISTORY OF PRESENT ILLNESS  (Location/Symptom, Timing/Onset, Context/Setting, Quality, Duration, Modifying Factors, Severity.)      Luna Weinberg is a 55-year-old female history of gastric bypass in 2019 who is presenting with chest pain that started 2 days ago. She reports that she has left upper chest pain she thought was related to not drinking enough water at work. She does not feel like a pressure and is intermittent. Also had some numbness of her hands, tunnel vision, edema of bilateral legs. She has worsening chest pain and SOB and was brought to ED by family. Over the past two weeks she has been having worsening GERD as well that she has restarted her GERD medication for and has not needed since the bypass.      PAST MEDICAL / SURGICAL / SOCIAL / FAMILY HISTORY      has a past medical history of Acid reflux, Gastric reflux, Hernia of abdominal wall, History of anxiety, Hypertension, Migraine, Neuropathy, Obesities, morbid ([address] TINO (obstructive sleep apnea), Suicide attempt [facility]), Thyroid disease, and Wears glasses. has a past surgical history that includes Tubal ligation; Upper gastrointestinal endoscopy (N/A, 12/7/2018); [place] tooth extraction; Sukhjinder-en-Y Gastric Bypass (04/01/2019); Sukhjinder-en-Y Gastric Bypass (N/A, 4/1/2019); Upper gastrointestinal endoscopy (N/A, 6/14/2019); Abdomen surgery; Cholecystectomy, laparoscopic (10/07/2019); Cholecystectomy, laparoscopic (N/A, 10/7/2019); Colonoscopy; Endoscopy, colon, diagnostic; lipectomy (04/01/2021); Abdominoplasty (04/01/2021); Umbilical hernia repair (04/01/2021); lipectomy (N/A, 4/1/2021); Abdominoplasty (N/A, 9/1/4144); and Umbilical hernia repair (N/A, 4/1/2021). Social History     Socioeconomic History    Marital status:      Spouse name: Not on file    Number of children: Not on file    Years of education: Not on file    Highest education level: Not on file   Occupational History    Not on file   Tobacco Use    Smoking status: Passive Smoke Exposure - Never Smoker    Smokeless tobacco: Never Used   Vaping Use    Vaping Use: Never used   Substance and Sexual Activity    Alcohol use: Yes     Comment: social     Drug use: Not Currently     Comment: drug use history as a teen    Sexual activity: Yes   Other Topics Concern    Not on file   Social History Narrative    Not on file     Social Determinants of Health     Financial Resource Strain:     Difficulty of Paying Living Expenses:    Food Insecurity:     Worried About Running Out of Food in the Last Year:     [address] in the Last Year:    Transportation Needs:     Lack of Transportation (Medical):      Lack of Transportation (Non-Medical):    Physical Activity:     Days of Exercise per Week:     Minutes of Exercise per Session:    Stress:     Feeling of Stress :    Social Connections:     Frequency of Communication with Friends and Family:     Frequency of Social Gatherings with Friends and Family:     Attends Spiritism Services:  Active Member of Clubs or Organizations:     Attends Club or Organization Meetings:     Marital Status:    Intimate Partner Violence:     Fear of Current or Ex-Partner:     Emotionally Abused:     Physically Abused:     Sexually Abused:        Family History   Problem Relation Age of Onset    Thyroid Disease Mother     Diabetes Mother     Heart Disease Mother     High Blood Pressure Mother    [facility] Migraines Mother     Diabetes Father     Heart Disease Father     Thyroid Disease Father     Liver Disease Father     High Blood Pressure Father     Cancer Father         liver    Alcohol Abuse Father     Depression Father     Thyroid Disease Sister     Neuropathy Sister     High Blood Pressure Sister     Kidney Disease Paternal Aunt     Heart Disease Paternal Uncle     Migraines Maternal Grandmother     Stroke Maternal Grandfather     Cancer Maternal Grandfather         breast    Other Paternal Grandmother         epilepsy    Diabetes Paternal Grandmother     High Blood Pressure Paternal Grandmother     Diabetes Paternal Grandfather     High Blood Pressure Paternal Grandfather     Heart Disease Paternal Grandfather        Allergies:  Latex    Home Medications:  Prior to Admission medications    Medication Sig Start Date End Date Taking?  Authorizing Provider   prazosin (MINIPRESS) 1 MG capsule take 1 capsule by mouth nightly 3/24/21  Yes MIKO Barton NP   pantoprazole (PROTONIX) 40 MG tablet Take 1 tablet by mouth daily 3/9/21  Yes Jada Perera MD   calcium carbonate (TUMS) 500 MG chewable tablet Take 1 tablet by mouth 2 times daily 5/15/20  Yes Jada Perera MD   SODIUM CHLORIDE, EXTERNAL, (WOUND [address] SALINE) 0.9 % SOLN Apply 30 mLs topically daily 5/7/21   Americo Crespo MD   cephALEXin (KEFLEX) 500 MG capsule Take 1 capsule by mouth 4 times daily  Patient not taking: Reported on 5/12/2021 4/1/21   Americo Crespo MD   baclofen (LIORESAL) 10 MG tablet Take 1 tablet by mouth 3 times daily 4/1/21   Emerald Medina MD   docusate sodium (COLACE) 100 MG capsule Take 1 capsule by mouth 2 times daily as needed for Constipation 3/9/21   Kayla Holguin MD   promethazine (PHENERGAN) 25 MG tablet Take 1 tablet by mouth every 8 hours as needed for Nausea 3/9/21   Kayla Holguin MD   vitamin D (ERGOCALCIFEROL) 1.25 MG (94394 UT) CAPS capsule Take 1 capsule by mouth once a week for 12 doses 3/9/21 5/26/21  MIKO Tellez CNP   vitamin D (ERGOCALCIFEROL) 1.25 MG (09917 UT) CAPS capsule Take 1 capsule by mouth once a week 3/9/21   Kayla Holguin MD   calcium carbonate (CALCIUM 600) 600 MG TABS tablet Take 1 tablet by mouth 2 times daily 3/9/21   Kayla Holguin MD   PARoxetine (PAXIL) 10 MG tablet Take 0.5 tablets by mouth every morning for 7 days, THEN 1 tablet every morning for 23 days. 11/20/20 1/27/21  MIKO Gonzalez NP   traZODone (DESYREL) 50 MG tablet Take 0.5 tablets by mouth nightly as needed for Sleep 11/20/20   MIKO Gonzalez NP   Multiple Vitamins-Minerals (CELEBRATE MULTI-COMPLETE 36) CHEW Take 1 tablet by mouth 2 times daily 5/15/20   Kayla Holguin MD       REVIEW OF SYSTEMS    (2-9 systems for level 4, 10 or more for level 5)      Review of Systems   Constitutional: Negative for fever. HENT: Negative for facial swelling. Eyes: Positive for visual disturbance. Respiratory: Positive for chest tightness and shortness of breath. Negative for cough. Cardiovascular: Positive for chest pain and leg swelling. Gastrointestinal: Positive for abdominal pain and nausea. Negative for vomiting. Genitourinary: Negative for decreased urine volume. Musculoskeletal: Negative for back pain. Skin: Negative for pallor. Neurological: Positive for dizziness and weakness. Psychiatric/Behavioral: Negative for confusion.        PHYSICAL EXAM   (up to 7 for level 4, 8 or more for level 5)      INITIAL VITALS:   /79   Pulse 81   Temp 97.3 °F (36.3 °C) (Infrared)   Resp 16   Ht 4' 9\" (1.448 m)   Wt 168 lb (76.2 kg)   LMP 09/22/2021   SpO2 99%   BMI 36.35 kg/m²     GENERAL: No acute distress, alert  HEENT: normocephalic, atraumatic. Anicteric sclerae, normal conjunctiva.   NECK: supple  CV: Regular rate, no murmurs, rubs, or gallops  Pulm: CTAB, no wheezes, rhonchi, rales  ABD: Soft, LUQ TTP, nondistended, well healed surgical scar  Neuro: No focal deficits  MSK:  no deformities or injuries,  2+ edema bilaterally  Skin: Warm, dry, cap refill <2  Psych: Normal mood      DIFFERENTIAL  DIAGNOSIS     PLAN (LABS / IMAGING / EKG):  Orders Placed This Encounter   Procedures    XR CHEST (2 VW)    CBC Auto Differential    Basic Metabolic Panel    Brain Natriuretic Peptide    Troponin    EKG 12 Lead       MEDICATIONS ORDERED:  Orders Placed This Encounter   Medications    ondansetron (ZOFRAN) injection 4 mg    famotidine (PEPCID) injection 20 mg    lactated ringers bolus       DDX: ACS, CHF, pneumonia, GERD    DIAGNOSTIC RESULTS / EMERGENCY DEPARTMENT COURSE / MDM   LAB RESULTS:  Results for orders placed or performed during the hospital encounter of 09/27/21   CBC Auto Differential   Result Value Ref Range    WBC 4.3 3.5 - 11.3 k/uL    RBC 4.15 3.95 - 5.11 m/uL    Hemoglobin 11.3 (L) 11.9 - 15.1 g/dL    Hematocrit 36.2 (L) 36.3 - 47.1 %    MCV 87.2 82.6 - 102.9 fL    MCH 27.2 25.2 - 33.5 pg    MCHC 31.2 28.4 - 34.8 g/dL    RDW 14.9 (H) 11.8 - 14.4 %    Platelets 937 151 - 671 k/uL    MPV 11.4 8.1 - 13.5 fL    NRBC Automated 0.0 0.0 per 100 WBC    Differential Type NOT REPORTED     Seg Neutrophils 60 36 - 65 %    Lymphocytes 29 24 - 43 %    Monocytes 7 3 - 12 %    Eosinophils % 3 1 - 4 %    Basophils 1 0 - 2 %    Immature Granulocytes 0 0 %    Segs Absolute 2.62 1.50 - 8.10 k/uL    Absolute Lymph # 1.24 1.10 - 3.70 k/uL    Absolute Mono # 0.32 0.10 - 1.20 k/uL    Absolute Eos # 0.12 0.00 - 0.44 k/uL    Basophils Absolute 0.03 0.00 - 0.20 k/uL    Absolute Immature Granulocyte <0.03 0.00 - 0.30 k/uL    WBC Morphology NOT REPORTED     RBC Morphology ANISOCYTOSIS PRESENT     Platelet Estimate NOT REPORTED    Basic Metabolic Panel   Result Value Ref Range    Glucose 113 (H) 70 - 99 mg/dL    BUN 9 6 - 20 mg/dL    CREATININE 0.43 (L) 0.50 - 0.90 mg/dL    Bun/Cre Ratio NOT REPORTED 9 - 20    Calcium 8.7 8.6 - 10.4 mg/dL    Sodium 136 135 - 144 mmol/L    Potassium 4.0 3.7 - 5.3 mmol/L    Chloride 105 98 - 107 mmol/L    CO2 22 20 - 31 mmol/L    Anion Gap 9 9 - 17 mmol/L    GFR Non-African American >60 >60 mL/min    GFR African American >60 >60 mL/min    GFR Comment          GFR Staging NOT REPORTED    Brain Natriuretic Peptide   Result Value Ref Range    Pro-BNP 79 <300 pg/mL    BNP Interpretation Pro-BNP Reference Range:    Troponin   Result Value Ref Range    Troponin, High Sensitivity <6 0 - 14 ng/L    Troponin T NOT REPORTED <0.03 ng/mL    Troponin Interp NOT REPORTED        IMPRESSION: new mild anemia    RADIOLOGY:  XR CHEST (2 VW)    Result Date: 9/27/2021  EXAMINATION: TWO XRAY VIEWS OF THE CHEST 9/27/2021 6:30 am COMPARISON: 08/12/2020 HISTORY: ORDERING SYSTEM PROVIDED HISTORY: chest pain TECHNOLOGIST PROVIDED HISTORY: chest pain Acuity: Unknown FINDINGS: The mediastinal and cardiac contours are normal.  The lungs are clear. There is no focal consolidation, pleural effusion or pneumothorax evident. The bones are unremarkable. No acute cardiopulmonary process identified. EKG  NSR    All EKG's are interpreted by the Emergency Department Physician who either signs or Co-signs this chart in the absence of a cardiologist.    EMERGENCY DEPARTMENT COURSE:  ED Course as of Sep 27 0711   Mon Sep 27, 2021   0543 Patient is a 41-year-old female history of gastric bypass in 2019 who is presenting with chest pain that started 2 days ago. She reports that she has left upper chest pain she thought was related to not drinking enough water at work.  She does not feel like a pressure and is intermittent. Also had some numbness of her hands, tunnel vision, edema of bilateral legs. She has worsening chest pain and SOB and was brought to ED by family. Over the past two weeks she has been having worsening GERD as well that she has restarted her GERD medication for and has not needed since the bypass. [ML]   0548 NSR on EKG    [ML]   0616 Hemoglobin Quant(!): 11.3 [ML]   0648 IMPRESSION:  No acute cardiopulmonary process identified. [ML]   0021 Patient reports resolution of symptoms. Discussed if second troponin is negative then will discharge and follow-up with Dr. Radu Gutierrez on October 7. [ML]      ED Course User Index  [ML] Jesica Abreu DO     Patient discussed with Dr. Cisco Mccullough who is assuming her care. FINAL IMPRESSION      1.  Chest pain, unspecified type          DISPOSITION / PLAN     DISPOSITION Discharge - Pending Orders Complete 09/27/2021 07:07:53 AM      PATIENT REFERRED TO:  Dr. Sandhya Pérez:  New Prescriptions    No medications on file       Catarina Henry DO  Emergency Medicine Resident    (Please note that portions of thisnote were completed with a voice recognition program.  Efforts were made to edit the dictations but occasionally words are mis-transcribed.)       Jesica Abreu DO  Resident  09/27/21 6386

## 2021-09-27 NOTE — ED NOTES
Patient stated she has had chest pain and felt lethargic since Saturday.  SOB when talking     Kyaw Ambrocio  09/27/21 0518

## 2021-09-27 NOTE — ED PROVIDER NOTES
[facility] HANDOFF       Handoff taken on the following patient from prior Attending Physician:Dr. Miranda Lozano  Pt Name: Ramon King  PCP:  No primary care provider on file. Attestation  I was available and discussed any additional care issues that arose and coordinated the management plans with the resident(s) caring for the patient during my duty period. Any areas of disagreement with resident's documentation of care or procedures are noted on the chart. I was personally present for the key portions of any/all procedures during my duty period. I have documented in the chart those procedures where I was not present during the key portions. CHIEF COMPLAINT       Chief Complaint   Patient presents with    Chest Pain         CURRENT MEDICATIONS     Previous Medications  Previous Medications    BACLOFEN (LIORESAL) 10 MG TABLET    Take 1 tablet by mouth 3 times daily    CALCIUM CARBONATE (CALCIUM 600) 600 MG TABS TABLET    Take 1 tablet by mouth 2 times daily    CALCIUM CARBONATE (TUMS) 500 MG CHEWABLE TABLET    Take 1 tablet by mouth 2 times daily    CEPHALEXIN (KEFLEX) 500 MG CAPSULE    Take 1 capsule by mouth 4 times daily    DOCUSATE SODIUM (COLACE) 100 MG CAPSULE    Take 1 capsule by mouth 2 times daily as needed for Constipation    MULTIPLE VITAMINS-MINERALS (CELEBRATE MULTI-COMPLETE 36) CHEW    Take 1 tablet by mouth 2 times daily    PANTOPRAZOLE (PROTONIX) 40 MG TABLET    Take 1 tablet by mouth daily    PAROXETINE (PAXIL) 10 MG TABLET    Take 0.5 tablets by mouth every morning for 7 days, THEN 1 tablet every morning for 23 days.     PRAZOSIN (MINIPRESS) 1 MG CAPSULE    take 1 capsule by mouth nightly    PROMETHAZINE (PHENERGAN) 25 MG TABLET    Take 1 tablet by mouth every 8 hours as needed for Nausea    SODIUM CHLORIDE, EXTERNAL, (WOUND WASH SALINE) 0.9 % SOLN    Apply 30 mLs topically daily    TRAZODONE (DESYREL) 50 MG TABLET    Take 0.5 tablets by mouth nightly as needed for Sleep    VITAMIN D (ERGOCALCIFEROL) 1.25 MG (69674 UT) CAPS CAPSULE    Take 1 capsule by mouth once a week for 12 doses    VITAMIN D (ERGOCALCIFEROL) 1.25 MG (99331 UT) CAPS CAPSULE    Take 1 capsule by mouth once a week       Encounter Medications  Orders Placed This Encounter   Medications    ondansetron (ZOFRAN) injection 4 mg    famotidine (PEPCID) injection 20 mg    lactated ringers bolus       ALLERGIES     is allergic to latex. RECENT VITALS:   Temp: 97.3 °F (36.3 °C),  Pulse: 81, Resp: 16, BP: 116/79    RADIOLOGY:   XR CHEST (2 VW)   Final Result   No acute cardiopulmonary process identified. LABS:  Labs Reviewed   CBC WITH AUTO DIFFERENTIAL - Abnormal; Notable for the following components:       Result Value    Hemoglobin 11.3 (*)     Hematocrit 36.2 (*)     RDW 14.9 (*)     All other components within normal limits   BASIC METABOLIC PANEL - Abnormal; Notable for the following components:    Glucose 113 (*)     CREATININE 0.43 (*)     All other components within normal limits   BRAIN NATRIURETIC PEPTIDE   TROPONIN   TROPONIN           PLAN/ TASKS OUTSTANDING       Pt with CP and significant family history.  Likely observation admit if no STEMI/NSTEMI.     (Please note that portions of this note were completed with a voice recognition program.  Efforts were made to edit the dictations but occasionally words are mis-transcribed.)    Jose Parks MD, MD,   Attending Emergency Physician         Jose Parks MD  09/27/21 3069

## 2021-09-27 NOTE — ED PROVIDER NOTES
[facility] ED     Emergency Department     Faculty Attestation        I performed a history and physical examination of the patient and discussed management with the resident. I reviewed the residents note and agree with the documented findings and plan of care. Any areas of disagreement are noted on the chart. I was personally present for the key portions of any procedures. I have documented in the chart those procedures where I was not present during the key portions. I have reviewed the emergency nurses triage note. I agree with the chief complaint, past medical history, past surgical history, allergies, medications, social and family history as documented unless otherwise noted below. For mid-level providers such as nurse practitioners as well as physicians assistants:    I have personally seen and evaluated the patient. I find the patient's history and physical exam are consistent with NP/PA documentation. I agree with the care provided, treatment rendered, disposition, & follow-up plan. Additional findings are as noted. Vital Signs: /79   Pulse 81   Temp 97.3 °F (36.3 °C) (Infrared)   Resp 16   Ht 4' 9\" (1.448 m)   Wt 168 lb (76.2 kg)   LMP 09/22/2021   SpO2 99%   BMI 36.35 kg/m²   PCP:  No primary care provider on file. Pertinent Comments:     Patient presents with some of left-sided chest pain rating to her shoulder meds been present since Saturday. Seems to be worse with exertion she denies fevers, chills, shortness of breath or cough. Has a strong family history of MIs in the 35s and 45s and her sisters required bypass at the age of 43. She is never seen a cardiologist before had any other cardiac work-up.   Currently at rest she has no chest pain will check EKG, CBC, BMP, troponin, chest x-ray, symptomatic treatment, probable admission for cardiac rule out      EKG Interpretation    Interpreted by me    Rhythm: normal sinus   Rate: normal  Ectopy: none  Conduction: normal  ST Segments: no acute change  T Waves: no acute change  Q Waves: none    Clinical Impression: Normal EKG          Meenu Springer MD    Attending Emergency Medicine Physician              Lawanda Harmon MD  09/27/21 0106

## 2021-09-27 NOTE — ED PROVIDER NOTES
[facility] ED  Emergency Department  Emergency Medicine Resident Sign-out     Care of Cherri Tracey was assumed from Dr. Leslie Ratliff and is being seen for Chest Pain  . The patient's initial evaluation and plan have been discussed with the prior provider who initially evaluated the patient. EMERGENCY DEPARTMENT COURSE / MEDICAL DECISION MAKING:       MEDICATIONS GIVEN:  Orders Placed This Encounter   Medications    ondansetron (ZOFRAN) injection 4 mg    famotidine (PEPCID) injection 20 mg    lactated ringers bolus       LABS / RADIOLOGY:     Labs Reviewed   CBC WITH AUTO DIFFERENTIAL - Abnormal; Notable for the following components:       Result Value    Hemoglobin 11.3 (*)     Hematocrit 36.2 (*)     RDW 14.9 (*)     All other components within normal limits   BASIC METABOLIC PANEL - Abnormal; Notable for the following components:    Glucose 113 (*)     CREATININE 0.43 (*)     All other components within normal limits   BRAIN NATRIURETIC PEPTIDE   TROPONIN   TROPONIN       XR CHEST (2 VW)    Result Date: 9/27/2021  EXAMINATION: TWO XRAY VIEWS OF THE CHEST 9/27/2021 6:30 am COMPARISON: 08/12/2020 HISTORY: ORDERING SYSTEM PROVIDED HISTORY: chest pain TECHNOLOGIST PROVIDED HISTORY: chest pain Acuity: Unknown FINDINGS: The mediastinal and cardiac contours are normal.  The lungs are clear. There is no focal consolidation, pleural effusion or pneumothorax evident. The bones are unremarkable. No acute cardiopulmonary process identified. RECENT VITALS:     Temp: 97.3 °F (36.3 °C),  Pulse: 55, Resp: 17, BP: (!) 139/91, SpO2: 98 %      This patient is a 45 y.o. Female with complaining of chest pain for the past 2 days. She has significant history concerning for gastric bypass in 2019. Patient also has been having worsening acid reflux for which she had to restart her antacid. Patient symptoms were resolved after receiving Pepcid. Lab work thus far is unremarkable.   Awaiting repeat troponin at this time first troponin was less than 6. ED Course as of Sep 27 0801   Mon Sep 27, 2021   0525 Patient is a 41-year-old female history of gastric bypass in 2019 who is presenting with chest pain that started 2 days ago. She reports that she has left upper chest pain she thought was related to not drinking enough water at work. She does not feel like a pressure and is intermittent. Also had some numbness of her hands, tunnel vision, edema of bilateral legs. She has worsening chest pain and SOB and was brought to ED by family. Over the past two weeks she has been having worsening GERD as well that she has restarted her GERD medication for and has not needed since the bypass. [ML]   0548 NSR on EKG    [ML]   0616 Hemoglobin Quant(!): 11.3 [ML]   0648 IMPRESSION:  No acute cardiopulmonary process identified. [ML]   2263 Patient reports resolution of symptoms. Discussed if second troponin is negative then will discharge and follow-up with Dr. Chuy Castro on October 7. [ML]      ED Course User Index  [ML] Jag Mcdowell DO       OUTSTANDING TASKS / RECOMMENDATIONS:    1. Follow-up repeat troponin  2. Discharge home     FINAL IMPRESSION:     1.  Chest pain, unspecified type        DISPOSITION:         DISPOSITION:  [x]  Discharge   []  Transfer -    []  Admission -     []  Against Medical Advice   []  Eloped   FOLLOW-UP: Dr. Ember Griffin: New Prescriptions    No medications on file          Nate Lawrence DO  Emergency Medicine Resident  [facility]        Nate Lawrence [address] [facility]  Resident  09/27/21 3934

## 2021-09-28 LAB
EKG ATRIAL RATE: 84 BPM
EKG P AXIS: 40 DEGREES
EKG P-R INTERVAL: 136 MS
EKG Q-T INTERVAL: 392 MS
EKG QRS DURATION: 94 MS
EKG QTC CALCULATION (BAZETT): 463 MS
EKG R AXIS: 16 DEGREES
EKG T AXIS: 21 DEGREES
EKG VENTRICULAR RATE: 84 BPM

## 2021-09-30 ENCOUNTER — OFFICE VISIT (OUTPATIENT)
Dept: BARIATRICS/WEIGHT MGMT | Age: 41
End: 2021-09-30
Payer: MEDICARE

## 2021-09-30 VITALS
DIASTOLIC BLOOD PRESSURE: 80 MMHG | WEIGHT: 174 LBS | HEART RATE: 80 BPM | HEIGHT: 57 IN | BODY MASS INDEX: 37.54 KG/M2 | SYSTOLIC BLOOD PRESSURE: 130 MMHG

## 2021-09-30 DIAGNOSIS — Z98.84 STATUS POST GASTRIC BYPASS FOR OBESITY: ICD-10-CM

## 2021-09-30 DIAGNOSIS — K28.9 GASTROJEJUNAL ULCER: Primary | ICD-10-CM

## 2021-09-30 PROCEDURE — G8417 CALC BMI ABV UP PARAM F/U: HCPCS | Performed by: SURGERY

## 2021-09-30 PROCEDURE — 99212 OFFICE O/P EST SF 10 MIN: CPT | Performed by: SURGERY

## 2021-09-30 PROCEDURE — G8427 DOCREV CUR MEDS BY ELIG CLIN: HCPCS | Performed by: SURGERY

## 2021-09-30 PROCEDURE — 1036F TOBACCO NON-USER: CPT | Performed by: SURGERY

## 2021-09-30 RX ORDER — FAMOTIDINE 20 MG/1
20 TABLET, FILM COATED ORAL 2 TIMES DAILY
Qty: 60 TABLET | Refills: 0 | Status: SHIPPED | OUTPATIENT
Start: 2021-09-30

## 2021-09-30 NOTE — LETTER
[facility]  [address]  [address] [facility]  Phone: [phone]  Fax: [address], [place]        September 30, 2021     Patient: Matthias Mullins   YOB: 1980   Date of Visit: 9/30/2021       To Whom It May Concern: It is my medical opinion that Velvet Koo may return to full duty immediately with no restrictions. If you have any questions or concerns, please don't hesitate to call.     Sincerely,        David Cruz, DO

## 2021-10-06 NOTE — PROGRESS NOTES
[facility]  [facility]  [address] [place] [address] [place]  [address]  [id number]  Dept: [phone]    SURGICAL WEIGHT LOSS MANAGEMENT PROGRAM  PROGRESS NOTE     CC: Weight Loss    Patient: Cari White      Service Date: 9/30/2021  Visit:   ER follow up  Medical Record #: [id number]  Date of Surgery:   4/1/2019    Reason for Visit: Routine Post-Operative:  [] New Problem /   [] FU of existing problem    Patient seen for follow up from ER visit. She was seen in the ER for increasing reflux and epigastric pain. This improved with apparent GI cocktail and Pepcid. She presents for follow up. No melena or hematochezia. Height: 4' 9\" (1.448 m)  Highest Weight:   256 lbs    Current Visit Weight Information  Weight: 174 lb (78.9 kg)   BMI: Body mass index is 37.65 kg/m². Weight loss since surgery:     122    1 wk - D/C'd home on VTE Prohylaxis? [x] Yes   [] No   On VTE Proph as directed? [x] Yes   [] No     [Labs to be drawn prior to 1m, 3m, 6m, 12m, 18m, and annual visits]    Liver pathology:    [x] NA    [] No Gross path    [] Liver Steatosis   [] Discussed w/ pt   [] Referred to GI     Exercising?    [x] Yes    [] No     Review of Systems:  General  Negative for: [] Weight Change   [x] Fatigue   [x] Fevers & Chills    [] Appetite change [] Other:    Positive for: [x] Weight Change   [] Fatigue   [] Fevers & Chills    [] Appetite change [] Other:   Cardiac  Negative for: [x] Chest Pain   [x] Difficulty Breathing   [] Leg Cramps [x] Edema of Lower Extremeties    [] Left   [] Right      Positive for: [] Chest Pain   [] Difficulty Breathing   [] Leg Cramps [] Edema of Lower Extremeties    [] Left   [] Right   Pulmonary  Negative for: [x] Shortness of Breath [] Wheeze [x] Cough  [] Calf Pain     Positive for: [] Shortness of Breath [] Wheeze [] Cough  [] Calf Pain   Gastro-Intestinal Negative for: [] Heartburn   [] Reflux   [x] Dysphagia   [] Melena   [x] BRBPR  [x] Vomiting   [] Abdominal Pain   [x] Diarrhea     [] Constipation  [] Other:     Positive for: [] Heartburn   [] Reflux   [] Dysphagia   [] Melena   [] BRBPR  [] Vomiting   [x] Abdominal Pain   [] Diarrhea     [] Constipation  [] Other:    Muskuloskeletal Negative for: [] Joint pain   [] Back pain   [] Knee Pain   [x] Muscle weakness [x] Hernia   [x] Edema [] Other:     Positive for: [] Joint pain   [] Back pain   [] Knee Pain   [] Muscle weakness [] Hernia   [] Edema [] Other:    Neurologic Negative for: [] Syncope   [] Insomnia   [] Being treated for depression  [] Other:     Positive for: [] Syncope   [] Insomnia   [] Being treated for depression  [] Other:    Skin Negative for: [x] Wound:   [] Open   [] Draining   [] Incisional     [x] Rash   [] Hair Loss  [] Other:     Positive for: [] Wound:   [] Open   [] Draining    [] Incisional  [] Rash   [] Hair Loss  [] Other:            Physical Assessment:     /80 (Site: Right Upper Arm, Position: Sitting, Cuff Size: Large Adult)   Pulse 80   Ht 4' 9\" (1.448 m)   Wt 174 lb (78.9 kg)   LMP 09/22/2021   BMI 37.65 kg/m²   Constitutional:  Vital signs are normal. The patient appears well-developed and well-nourished. HEENT:   Head: Normocephalic. Atraumatic  Eyes: pupils are equal and reactive. No scleral icterus is present. Neck: No mass and no thyromegaly present. Cardiovascular: Normal rate, regular rhythm, S1 normal and S2 normal.  Radial pulses present   Pulmonary/Chest: Effort normal and breath sounds normal. No retractions  Abdominal: Soft. Normal appearance. There is no organomegaly. No tenderness. There is no rigidity, no rebound, no guarding and no Reid's sign. Musculoskeletal:        Right lower leg: Normal. No tenderness and no edema. Left lower leg: Normal. No tenderness and no edema. Neurological: The patient is alert and oriented. Moving all 4 extremities, sensation grossly intact bilateral  Skin: Skin is warm, dry and intact.    Psychiatric: The patient

## 2021-10-15 ENCOUNTER — TELEPHONE (OUTPATIENT)
Dept: BARIATRICS/WEIGHT MGMT | Age: 41
End: 2021-10-15

## 2021-10-19 ENCOUNTER — PATIENT MESSAGE (OUTPATIENT)
Dept: BARIATRICS/WEIGHT MGMT | Age: 41
End: 2021-10-19

## 2021-10-19 NOTE — TELEPHONE ENCOUNTER
From: Jacob Perera  To: Tori Wynne DO  Sent: 10/19/2021 3:20 AM EDT  Subject: Visit Follow-Up Question    I am still experiencing a lot of stomach burning & feeling of having insides ripped out. Especially after eating. I was worried I had stretched my pouch but I hardly eat now. I'd like to schedule a scope per  but can't do so til anytime after 11/14. Can that be done or do I need to come back in? William Ann said we would if meds didn't help. I work til 5pm but will [employer]. you can also leave a voice-mail on [phone]. Thank you!

## 2021-10-31 ENCOUNTER — HOSPITAL ENCOUNTER (EMERGENCY)
Age: 41
Discharge: HOME | End: 2021-10-31
Attending: EMERGENCY MEDICINE
Payer: MEDICARE

## 2021-10-31 ENCOUNTER — APPOINTMENT (OUTPATIENT)
Dept: GENERAL RADIOLOGY | Age: 41
End: 2021-10-31
Payer: MEDICARE

## 2021-10-31 VITALS
RESPIRATION RATE: 15 BRPM | TEMPERATURE: 98.4 F | SYSTOLIC BLOOD PRESSURE: 127 MMHG | HEART RATE: 95 BPM | DIASTOLIC BLOOD PRESSURE: 84 MMHG | OXYGEN SATURATION: 100 %

## 2021-10-31 DIAGNOSIS — G56.01 CARPAL TUNNEL SYNDROME OF RIGHT WRIST: Primary | ICD-10-CM

## 2021-10-31 PROCEDURE — 73100 X-RAY EXAM OF WRIST: CPT

## 2021-10-31 PROCEDURE — 99284 EMERGENCY DEPT VISIT MOD MDM: CPT

## 2021-10-31 PROCEDURE — 73130 X-RAY EXAM OF HAND: CPT

## 2021-10-31 PROCEDURE — 6370000000 HC RX 637 (ALT 250 FOR IP): Performed by: EMERGENCY MEDICINE

## 2021-10-31 RX ORDER — KETOROLAC TROMETHAMINE 30 MG/ML
30 INJECTION, SOLUTION INTRAMUSCULAR; INTRAVENOUS ONCE
Status: DISCONTINUED | OUTPATIENT
Start: 2021-10-31 | End: 2021-10-31

## 2021-10-31 RX ORDER — LIDOCAINE 4 G/G
1 PATCH TOPICAL DAILY
Status: DISCONTINUED | OUTPATIENT
Start: 2021-10-31 | End: 2021-10-31 | Stop reason: HOSPADM

## 2021-10-31 ASSESSMENT — PAIN SCALES - GENERAL: PAINLEVEL_OUTOF10: 6

## 2021-10-31 ASSESSMENT — ENCOUNTER SYMPTOMS: BACK PAIN: 1

## 2021-10-31 NOTE — ED PROVIDER NOTES
night. She is a Sukhjinder-en-Y gastric bypass patient cannot take NSAIDs but has had some relief with Tylenol. On exam patient has exacerbation of symptoms with forced flexion forced extension of the wrists isometric contractions. Does have decreased sensation subjectively in the median distribution but no median nerve weakness. Strong pulses.  On the back no midline thoracic or lumbar tenderness only paraspinal. Will image hand given no prior history, suspect carpal tunnel plan discharge with her splint, follow-up with PCP      Critical Care     none    Sindhu Mckenna MD, Jyoti Powell  Attending Emergency  Physician             Sindhu Mckenna MD  10/31/21 0900

## 2021-10-31 NOTE — Clinical Note
Patricia Russ was seen and treated in our emergency department on 10/31/2021. She may return to work on 10/31/2021. Work with your employer to reduce repetitive motion of your right wrist, this will involve changing task frequently. You should give your wrist a little bit of time of rest with light duty, you may return to normal duty once pain with activity is reduced. If you have any questions or concerns, please don't hesitate to call.       Radu Middleton, DO

## 2021-10-31 NOTE — ED PROVIDER NOTES
(10/07/2019); Cholecystectomy, laparoscopic (N/A, 10/7/2019); Colonoscopy; Endoscopy, colon, diagnostic; lipectomy (04/01/2021); Abdominoplasty (04/01/2021); Umbilical hernia repair (04/01/2021); lipectomy (N/A, 4/1/2021); Abdominoplasty (N/A, 2/0/4864); and Umbilical hernia repair (N/A, 4/1/2021). No additional pertinent    Social History     Socioeconomic History    Marital status:      Spouse name: Not on file    Number of children: Not on file    Years of education: Not on file    Highest education level: Not on file   Occupational History    Not on file   Tobacco Use    Smoking status: Passive Smoke Exposure - Never Smoker    Smokeless tobacco: Never Used   Vaping Use    Vaping Use: Never used   Substance and Sexual Activity    Alcohol use: Yes     Comment: social     Drug use: Not Currently     Comment: drug use history as a teen    Sexual activity: Yes   Other Topics Concern    Not on file   Social History Narrative    Not on file     Social Determinants of Health     Financial Resource Strain:     Difficulty of Paying Living Expenses:    Food Insecurity:     Worried About Running Out of Food in the Last Year:     [address] in the Last Year:    Transportation Needs:     Lack of Transportation (Medical):      Lack of Transportation (Non-Medical):    Physical Activity:     Days of Exercise per Week:     Minutes of Exercise per Session:    Stress:     Feeling of Stress :    Social Connections:     Frequency of Communication with Friends and Family:     Frequency of Social Gatherings with Friends and Family:     Attends Hoah[employer]ism Services:     Active Member of Clubs or Organizations:     Attends Club or Organization Meetings:     Marital Status:    Intimate Partner Violence:     Fear of Current or Ex-Partner:     Emotionally Abused:     Physically Abused:     Sexually Abused:        Family History   Problem Relation Age of Onset    Thyroid Disease Mother     Diabetes Mother     Heart Disease Mother     High Blood Pressure Mother    Parul Neri Migraines Mother     Diabetes Father     Heart Disease Father     Thyroid Disease Father     Liver Disease Father     High Blood Pressure Father     Cancer Father         liver    Alcohol Abuse Father     Depression Father     Thyroid Disease Sister     Neuropathy Sister     High Blood Pressure Sister     Kidney Disease Paternal Aunt     Heart Disease Paternal Uncle     Migraines Maternal Grandmother     Stroke Maternal Grandfather     Cancer Maternal Grandfather         breast    Other Paternal Grandmother         epilepsy    Diabetes Paternal Grandmother     High Blood Pressure Paternal Grandmother     Diabetes Paternal Grandfather     High Blood Pressure Paternal Grandfather     Heart Disease Paternal Grandfather        Allergies:  Latex    Home Medications:  Prior to Admission medications    Medication Sig Start Date End Date Taking?  Authorizing Provider   famotidine (PEPCID) 20 MG tablet Take 1 tablet by mouth 2 times daily 9/30/21   Ellen Mckee,    SODIUM CHLORIDE, EXTERNAL, (WOUND [address] SALINE) 0.9 % SOLN Apply 30 mLs topically daily 5/7/21   Paulette Allen MD   cephALEXin (KEFLEX) 500 MG capsule Take 1 capsule by mouth 4 times daily  Patient not taking: Reported on 5/12/2021 4/1/21   Paulette Allen MD   baclofen (LIORESAL) 10 MG tablet Take 1 tablet by mouth 3 times daily 4/1/21   Paulette Allen MD   docusate sodium (COLACE) 100 MG capsule Take 1 capsule by mouth 2 times daily as needed for Constipation 3/9/21   Siomara Arechiga MD   pantoprazole (PROTONIX) 40 MG tablet Take 1 tablet by mouth daily 3/9/21   Siomara Arechiga MD   promethazine (PHENERGAN) 25 MG tablet Take 1 tablet by mouth every 8 hours as needed for Nausea 3/9/21   Siomara Arechiga MD   vitamin D (ERGOCALCIFEROL) 1.25 MG (00107 UT) CAPS capsule Take 1 capsule by mouth once a week for 12 doses 3/9/21 5/26/21  MIKO Messina - ALFREDO   vitamin D (ERGOCALCIFEROL) 1.25 MG (73975 UT) CAPS capsule Take 1 capsule by mouth once a week 3/9/21   Alda Jovel MD   calcium carbonate (CALCIUM 600) 600 MG TABS tablet Take 1 tablet by mouth 2 times daily 3/9/21   Alda Jovel MD   PARoxetine (PAXIL) 10 MG tablet Take 0.5 tablets by mouth every morning for 7 days, THEN 1 tablet every morning for 23 days. 11/20/20 1/27/21  Greer Old, APRN - NP   traZODone (DESYREL) 50 MG tablet Take 0.5 tablets by mouth nightly as needed for Sleep 11/20/20   Greer Old, APRN - NP   Multiple Vitamins-Minerals (CELEBRATE MULTI-COMPLETE 36) CHEW Take 1 tablet by mouth 2 times daily 5/15/20   Alda Jovel MD   calcium carbonate (TUMS) 500 MG chewable tablet Take 1 tablet by mouth 2 times daily 5/15/20   Alda Jovel MD       REVIEW OF SYSTEMS    (2-9 systems for level 4, 10 or more for level 5)      Review of Systems   Constitutional: Negative for chills and fever. Musculoskeletal: Positive for back pain (Thoracic). Negative for gait problem, neck pain and neck stiffness. Neurological: Positive for weakness (Right hand) and numbness (Right hand). PHYSICAL EXAM   (up to 7 for level 4, 8 or more for level 5)      INITIAL VITALS:   /84   Pulse 95   Temp 98.4 °F (36.9 °C) (Temporal)   Resp 15   SpO2 100%     Physical Exam  Constitutional:       Appearance: Normal appearance. HENT:      Head: Normocephalic. Mouth/Throat:      Mouth: Mucous membranes are moist.      Pharynx: Oropharynx is clear. Pulmonary:      Effort: Pulmonary effort is normal.   Abdominal:      General: Abdomen is flat. Palpations: Abdomen is soft. Musculoskeletal:      Right hand: Tenderness present. Decreased range of motion. Normal capillary refill. Normal pulse. Left hand: No tenderness. Normal capillary refill. Normal pulse. Cervical back: No tenderness.       Comments: Hand numbness tingling in first 1-3 digits, patient is able to identify digits, good pulses, patient is weak on hand squeeze on the right compared to the left. Patient has good cap refill. Skin:     General: Skin is warm and dry. Neurological:      Mental Status: She is alert and oriented to person, place, and time. Sensory: No sensory deficit. Psychiatric:         Mood and Affect: Mood normal.         Behavior: Behavior normal.         DIFFERENTIAL  DIAGNOSIS     PLAN (LABS / IMAGING / EKG):  Orders Placed This Encounter   Procedures    XR HAND RIGHT (MIN 3 VIEWS)    XR WRIST RIGHT (2 VIEWS)    Apply ice to affected area    Velcro Wrist Splint       MEDICATIONS ORDERED:  Orders Placed This Encounter   Medications    DISCONTD: ketorolac (TORADOL) injection 30 mg    DISCONTD: lidocaine 4 % external patch 1 patch       DDX: Carpal tunnel, fracture to the right hand, work overuse injury, peripheral neuropathy of the upper extremity    DIAGNOSTIC RESULTS / [address] [place] [address] / [facility]   LAB RESULTS:  No results found for this visit on 10/31/21. RADIOLOGY:  XR HAND RIGHT (MIN 3 VIEWS)   Final Result   Negative right hand and wrist with no acute osseous abnormality. XR WRIST RIGHT (2 VIEWS)   Final Result   Negative right hand and wrist with no acute osseous abnormality. PROCEDURES:  None    CONSULTS:  None    MEDICAL DECISION MAKING:  Patient presenting with right numbness and tingling in a median nerve distribution, describes it worsening after working on a job for 1 month. Patient also does have some thoracic back pain, tender to paraspinal and spinal tenderness. Patient has good mobility, neurologically intact. Patient has decreased strength of the right upper secondary to the numbness and tingling, states that it got worse last night around 1 AM, describes it getting worse more often in the morning get better throughout the day.   Patient has positive Tinel sign, x-rays were noted to be negative, patient concerning for carpal tunnel syndrome and provided wrist brace. Patient discharged home in stable condition. Patient to follow-up with occupational therapy and primary care for further evaluation and treatment of carpal tunnel. Lidocaine patch was also applied to the thoracic spine for treatment of her pain. CRITICAL CARE:  Please see attending note    FINAL IMPRESSION      1.  Carpal tunnel syndrome of right wrist          DISPOSITION / PLAN     DISPOSITION Decision To Discharge 10/31/2021 09:22:59 AM      PATIENT REFERRED TO:  [address] [place] [id number] [phone]  Schedule an appointment as soon as possible for a visit today      [facility]  [address] [place] [address]  [phone]  Schedule an appointment as soon as possible for a visit         DISCHARGE MEDICATIONS:  Discharge Medication List as of 10/31/2021  9:27 AM          Haroon Preciado DO  Emergency Medicine Resident    (Please note that portions of thisnote were completed with a voice recognition program.  Efforts were made to edit the dictations but occasionally words are mis-transcribed.)        Ana Coleman DO  Resident  10/31/21 [id number]

## 2021-11-02 ENCOUNTER — OFFICE VISIT (OUTPATIENT)
Dept: INTERNAL MEDICINE | Age: 41
End: 2021-11-02
Payer: MEDICARE

## 2021-11-02 VITALS
BODY MASS INDEX: 37.22 KG/M2 | HEART RATE: 57 BPM | DIASTOLIC BLOOD PRESSURE: 82 MMHG | WEIGHT: 172 LBS | SYSTOLIC BLOOD PRESSURE: 112 MMHG

## 2021-11-02 DIAGNOSIS — G56.01 CARPAL TUNNEL SYNDROME OF RIGHT WRIST: ICD-10-CM

## 2021-11-02 DIAGNOSIS — Z11.59 ENCOUNTER FOR HEPATITIS C SCREENING TEST FOR LOW RISK PATIENT: Primary | ICD-10-CM

## 2021-11-02 PROBLEM — F31.81: Status: RESOLVED | Noted: 2020-10-13 | Resolved: 2021-11-02

## 2021-11-02 PROCEDURE — G8417 CALC BMI ABV UP PARAM F/U: HCPCS | Performed by: INTERNAL MEDICINE

## 2021-11-02 PROCEDURE — G8427 DOCREV CUR MEDS BY ELIG CLIN: HCPCS | Performed by: INTERNAL MEDICINE

## 2021-11-02 PROCEDURE — G8484 FLU IMMUNIZE NO ADMIN: HCPCS | Performed by: INTERNAL MEDICINE

## 2021-11-02 PROCEDURE — 99213 OFFICE O/P EST LOW 20 MIN: CPT | Performed by: INTERNAL MEDICINE

## 2021-11-02 PROCEDURE — 99211 OFF/OP EST MAY X REQ PHY/QHP: CPT

## 2021-11-02 PROCEDURE — 1036F TOBACCO NON-USER: CPT | Performed by: INTERNAL MEDICINE

## 2021-11-02 RX ORDER — IBUPROFEN 200 MG
400 TABLET ORAL EVERY 6 HOURS PRN
Qty: 120 TABLET | Refills: 3 | Status: SHIPPED | OUTPATIENT
Start: 2021-11-02 | End: 2022-01-01

## 2021-11-02 RX ORDER — METHYLPREDNISOLONE 4 MG/1
TABLET ORAL
Qty: 1 KIT | Refills: 0 | Status: SHIPPED | OUTPATIENT
Start: 2021-11-02 | End: 2021-11-08

## 2021-11-02 SDOH — ECONOMIC STABILITY: FOOD INSECURITY: WITHIN THE PAST 12 MONTHS, THE FOOD YOU BOUGHT JUST DIDN'T LAST AND YOU DIDN'T HAVE MONEY TO GET MORE.: NEVER TRUE

## 2021-11-02 SDOH — ECONOMIC STABILITY: FOOD INSECURITY: WITHIN THE PAST 12 MONTHS, YOU WORRIED THAT YOUR FOOD WOULD RUN OUT BEFORE YOU GOT MONEY TO BUY MORE.: NEVER TRUE

## 2021-11-02 ASSESSMENT — SOCIAL DETERMINANTS OF HEALTH (SDOH): HOW HARD IS IT FOR YOU TO PAY FOR THE VERY BASICS LIKE FOOD, HOUSING, MEDICAL CARE, AND HEATING?: NOT HARD AT ALL

## 2021-11-02 NOTE — PATIENT INSTRUCTIONS
Medications e-scribe to pharmacy of pt's choice. Script for lab given to pt, no fasting required. Pt will get labs done before next appt. An After Visit Summary was printed and given to the patient.   YNES

## 2021-11-02 NOTE — PROGRESS NOTES
[facility]      Date of Patient's Visit: 11/2/2021    Progress note    Patient Care Team:  Ruiz Luciano MD as PCP - [facility] Empaneled Provider  Migue Thompson MD as Consulting Physician (Pulmonology)      [address]  Chief Complaint   Patient presents with    Gastroesophageal Reflux    Health Maintenance     decline flu vaccine, wants Hep C testing       SUBJECTIVE  Courtney Hill is a 45 y.o. female who presents for the c/o right wrist pain along with numbness in the radial 2.5 digits   She has started a new job requiring a repetitive motion of the wrist            ROS  All other review of systems negative, except for those noted.     Review of Systems    Past Medical History:   Diagnosis Date    Acid reflux     Gastric reflux     Hernia of abdominal wall     History of anxiety     history of panic attacks    Hypertension     Migraine     Neuropathy     Obesities, morbid ([address] 8/31/2018    TINO (obstructive sleep apnea) 12/17/2018    USES CPAP    Suicide attempt ([address]     Thyroid disease     Wears glasses        Past Surgical History:   Procedure Laterality Date    ABDOMEN SURGERY      ABDOMINOPLASTY  04/01/2021    ABDOMINOPLASTY WITH INCISIONAL WOUND VAC    ABDOMINOPLASTY N/A 4/1/2021    ABDOMINOPLASTY WITH INCISIONAL WOUND VAC AND BIOPATCH AROUND ELIZABETH DRAIN AND PRE OP TAP BLOCK performed by Tod Schafer MD at [address]  10/07/2019    Laparoscopic cholecystectomy    CHOLECYSTECTOMY, LAPAROSCOPIC N/A 10/7/2019    XI LAPAROSCOPIC ROBOTIC CHOLECYSTECTOMY performed by Desiree Dos Santos DO at [address], COLON, DIAGNOSTIC      LIPECTOMY  04/01/2021    LIPECTOMY N/A 4/1/2021    PANNICULECTOMY performed by Tod Schafer MD at [address] [facility] [address]  04/01/2019    ROBOTIC LAPAROSCOPIC GASTRIC BYPASS ZAID-EN-Y, LIVER BIOPSY, EGD     ZAID-EN-Y GASTRIC BYPASS N/A 4/1/2019    XI ROBOTIC LAPAROSCOPIC GASTRIC BYPASS ZAID-EN-Y, LIVER BIOPSY, EGD performed by Mark Crain DO at [address]   01/46/3180    UMBILICAL HERNIA REPAIR N/A 0/7/3550    HERNIA UMBILICAL REPAIR performed by Gerardo Hansen DO at [address] [facility] N/A 12/7/2018    EGD ESOPHAGOGASTRODUODENOSCOPY performed by Mark Crain DO at [facility]    UPPER GASTROINTESTINAL ENDOSCOPY N/A 6/14/2019    egd  performed by Mark Crain DO at [facility]    WISDOM TOOTH EXTRACTION         Family History   Problem Relation Age of Onset    Thyroid Disease Mother     Diabetes Mother     Heart Disease Mother     High Blood Pressure Mother    [employer] Migraines Mother     Diabetes Father     Heart Disease Father     Thyroid Disease Father     Liver Disease Father     High Blood Pressure Father     Cancer Father         liver    Alcohol Abuse Father     Depression Father     Thyroid Disease Sister     Neuropathy Sister     High Blood Pressure Sister     Kidney Disease Paternal Aunt     Heart Disease Paternal Uncle     Migraines Maternal Grandmother     Stroke Maternal Grandfather     Cancer Maternal Grandfather         breast    Other Paternal Grandmother         epilepsy    Diabetes Paternal Grandmother     High Blood Pressure Paternal Grandmother     Diabetes Paternal Grandfather     High Blood Pressure Paternal Grandfather     Heart Disease Paternal Grandfather        Social History     Socioeconomic History    Marital status:      Spouse name: None    Number of children: None    Years of education: None    Highest education level: None   Occupational History    None   Tobacco Use    Smoking status: Passive Smoke Exposure - Never Smoker    Smokeless tobacco: Never Used   Vaping Use    Vaping Use: Never used   Substance and Sexual Activity    Alcohol use: Yes     Comment: social  Drug use: Not Currently     Comment: drug use history as a teen    Sexual activity: Yes   Other Topics Concern    None   Social History Narrative    None     Social Determinants of Health     Financial Resource Strain: Low Risk     Difficulty of Paying Living Expenses: Not hard at all   Food Insecurity: No Food Insecurity    Worried About Running Out of Food in the Last Year: Never true    [address] in the Last Year: Never true   Transportation Needs:     Lack of Transportation (Medical):  Lack of Transportation (Non-Medical):    Physical Activity:     Days of Exercise per Week:     Minutes of Exercise per Session:    Stress:     Feeling of Stress :    Social Connections:     Frequency of Communication with Friends and Family:     Frequency of Social Gatherings with Friends and Family:     Attends Shinto Services:     Active Member of Clubs or Organizations:     Attends Club or Organization Meetings:     Marital Status:    Intimate Partner Violence:     Fear of Current or Ex-Partner:     Emotionally Abused:     Physically Abused:     Sexually Abused:        Current Outpatient Medications   Medication Sig Dispense Refill    methylPREDNISolone (MEDROL DOSEPACK) 4 MG tablet Take by mouth.  1 kit 0    ibuprofen (ADVIL) 200 MG tablet Take 2 tablets by mouth every 6 hours as needed for Pain 120 tablet 3    famotidine (PEPCID) 20 MG tablet Take 1 tablet by mouth 2 times daily 60 tablet 0    SODIUM CHLORIDE, EXTERNAL, (WOUND WASH SALINE) 0.9 % SOLN Apply 30 mLs topically daily 210 mL 5    cephALEXin (KEFLEX) 500 MG capsule Take 1 capsule by mouth 4 times daily (Patient not taking: Reported on 5/12/2021) 40 capsule 0    baclofen (LIORESAL) 10 MG tablet Take 1 tablet by mouth 3 times daily 30 tablet 0    docusate sodium (COLACE) 100 MG capsule Take 1 capsule by mouth 2 times daily as needed for Constipation (Patient not taking: Reported on 11/2/2021) 60 capsule 3    pantoprazole (PROTONIX) 40 MG tablet Take 1 tablet by mouth daily (Patient not taking: Reported on 11/2/2021) 60 tablet 3    promethazine (PHENERGAN) 25 MG tablet Take 1 tablet by mouth every 8 hours as needed for Nausea (Patient not taking: Reported on 11/2/2021) 30 tablet 2    vitamin D (ERGOCALCIFEROL) 1.25 MG (54385 UT) CAPS capsule Take 1 capsule by mouth once a week for 12 doses 12 capsule 0    vitamin D (ERGOCALCIFEROL) 1.25 MG (84300 UT) CAPS capsule Take 1 capsule by mouth once a week (Patient not taking: Reported on 11/2/2021) 12 capsule 0    calcium carbonate (CALCIUM 600) 600 MG TABS tablet Take 1 tablet by mouth 2 times daily (Patient not taking: Reported on 11/2/2021) 270 tablet 2    PARoxetine (PAXIL) 10 MG tablet Take 0.5 tablets by mouth every morning for 7 days, THEN 1 tablet every morning for 23 days. 27 tablet 0    traZODone (DESYREL) 50 MG tablet Take 0.5 tablets by mouth nightly as needed for Sleep 15 tablet 0    Multiple Vitamins-Minerals (CELEBRATE MULTI-COMPLETE 36) CHEW Take 1 tablet by mouth 2 times daily (Patient not taking: Reported on 11/2/2021) 60 tablet 5    calcium carbonate (TUMS) 500 MG chewable tablet Take 1 tablet by mouth 2 times daily (Patient not taking: Reported on 11/2/2021) 60 tablet 5     No current facility-administered medications for this visit. Allergies   Allergen Reactions    Latex Hives and Itching       PHYSICAL EXAM:   Vital Signs:   /82   Pulse 57   Wt 172 lb (78 kg)   BMI 37.22 kg/m²     BP Readings from Last 3 Encounters:   11/02/21 112/82   10/31/21 127/84   09/30/21 130/80        Wt Readings from Last 3 Encounters:   11/02/21 172 lb (78 kg)   09/30/21 174 lb (78.9 kg)   09/27/21 168 lb (76.2 kg)       Physical Exam  Cardiovascular:      Rate and Rhythm: Normal rate and regular rhythm. Pulmonary:      Effort: Pulmonary effort is normal.   Abdominal:      General: Abdomen is flat. Musculoskeletal:         General: Normal range of motion. Skin:     General: Skin is warm. Neurological:      General: No focal deficit present. Mental Status: She is alert and oriented to person, place, and time. Body mass index is 37.22 kg/m². RESULTS    Lab Findings    CBC:   Lab Results   Component Value Date    WBC 4.3 09/27/2021    HGB 11.3 09/27/2021     09/27/2021     BMP:   Lab Results   Component Value Date     09/27/2021    K 4.0 09/27/2021     09/27/2021    CO2 22 09/27/2021    BUN 9 09/27/2021    CREATININE 0.43 09/27/2021    GLUCOSE 113 09/27/2021     HEMOGLOBIN A1C:   Lab Results   Component Value Date    LABA1C 4.8 03/09/2021     MICROALBUMIN URINE: No results found for: MICROALBUR  FASTING LIPID PANEL:   Lab Results   Component Value Date    CHOL 120 09/11/2020    HDL 42 09/11/2020    TRIG 114 09/11/2020     Lab Results   Component Value Date    LDLCHOLESTEROL 55 09/11/2020     LIVER PROFILE:   Lab Results   Component Value Date    ALT 27 03/09/2021    AST 25 03/09/2021    PROT 6.7 03/09/2021    BILITOT 0.77 03/09/2021    BILIDIR 0.22 06/13/2019    LABALBU 4.1 03/09/2021      THYROID FUNCTION:   Lab Results   Component Value Date    TSH 1.21 03/09/2021      URINE ANALYSIS: No results found for: [address]    1. Encounter for hepatitis C screening test for low risk patient    - Hepatitis C Antibody; Future    2. Carpal tunnel syndrome of right wrist    - methylPREDNISolone (MEDROL DOSEPACK) 4 MG tablet; Take by mouth. Dispense: 1 kit; Refill: 0  - ibuprofen (ADVIL) 200 MG tablet; Take 2 tablets by mouth every 6 hours as needed for Pain  Dispense: 120 tablet; Refill: 3            Follow up Instructions:    1. Return in about 6 months (around 5/2/2022). 2. Reviewed prior labs and health maintenance. 3. Discussed use, benefit, and side effects of prescribed medications. Barriers to medication compliance addressed. All patient questions answered. Pt voiced understanding.      4. Patient given educational materials - see patient instructions      MD DONAL Sanon  Attending Physician, [address] [place] [address], Internal Medicine Residency Program  [address] [place] [address]  11/2/2021, 3:46 PM

## 2021-11-02 NOTE — LETTER
[address]  [address] [address]  Phone: [phone]  Fax: [phone]    Katie Garcia MD        November 2, 2021      To whom it may concern,   The patient has carpal tunnel syndrome that can flare up from repetitive wrist motion. She is advised periodic stretching of the wrist during the day at work. she can wear a wrist brace as needed at work. It is safe for her to return to work tomorrow without any restrictions.       Sincerely,        Katie Garcia MD

## 2021-11-22 NOTE — TELEPHONE ENCOUNTER
Pt. Wants to know if they are able to be scoped sooner , pt. Is scheduled for January 7th and is having heartburn after eating and concerns of  Pouch being stretched out .  Please Advise

## 2021-11-22 NOTE — TELEPHONE ENCOUNTER
Scheduled dec 1, 2021 at 11:30 am to arrive at 9:30 am, covid scheduled at [facility] at 11:15 am on sat nov 27

## 2021-11-23 ENCOUNTER — OFFICE VISIT (OUTPATIENT)
Dept: INTERNAL MEDICINE | Age: 41
End: 2021-11-23
Payer: MEDICARE

## 2021-11-23 VITALS
HEIGHT: 57 IN | DIASTOLIC BLOOD PRESSURE: 78 MMHG | HEART RATE: 70 BPM | WEIGHT: 173 LBS | BODY MASS INDEX: 37.32 KG/M2 | SYSTOLIC BLOOD PRESSURE: 122 MMHG

## 2021-11-23 DIAGNOSIS — G56.03 BILATERAL CARPAL TUNNEL SYNDROME: Primary | ICD-10-CM

## 2021-11-23 PROCEDURE — G8427 DOCREV CUR MEDS BY ELIG CLIN: HCPCS | Performed by: STUDENT IN AN ORGANIZED HEALTH CARE EDUCATION/TRAINING PROGRAM

## 2021-11-23 PROCEDURE — 99211 OFF/OP EST MAY X REQ PHY/QHP: CPT | Performed by: INTERNAL MEDICINE

## 2021-11-23 PROCEDURE — G8417 CALC BMI ABV UP PARAM F/U: HCPCS | Performed by: STUDENT IN AN ORGANIZED HEALTH CARE EDUCATION/TRAINING PROGRAM

## 2021-11-23 PROCEDURE — G8484 FLU IMMUNIZE NO ADMIN: HCPCS | Performed by: STUDENT IN AN ORGANIZED HEALTH CARE EDUCATION/TRAINING PROGRAM

## 2021-11-23 PROCEDURE — 99213 OFFICE O/P EST LOW 20 MIN: CPT | Performed by: STUDENT IN AN ORGANIZED HEALTH CARE EDUCATION/TRAINING PROGRAM

## 2021-11-23 PROCEDURE — 1036F TOBACCO NON-USER: CPT | Performed by: STUDENT IN AN ORGANIZED HEALTH CARE EDUCATION/TRAINING PROGRAM

## 2021-11-23 RX ORDER — ACETAMINOPHEN 500 MG
500 TABLET ORAL 4 TIMES DAILY PRN
Qty: 120 TABLET | Refills: 0 | Status: SHIPPED | OUTPATIENT
Start: 2021-11-23 | End: 2022-01-01

## 2021-11-23 NOTE — PATIENT INSTRUCTIONS
Medications e-scribe to pharmacy of pt's choice. A printed script for Durable Medical Equipment was ordered for the patient. The script was given with an list of DME companies. Order for Nerve Conduction test faxed to [address] [place] [address] they will all pt for appt. Please call [phone] in not heard within 2 weeks. Referral to Orthopaedics was placed, summary of care printed and faxed to office, phone numbers given to the patient, they will contact office for an appt    Patient was put on a wait list for 6 months and will be contacted to schedule their next follow up appointment once the schedule is available. If the patient is in need of an appointment before their next visit please call the office at [phone]. After Visit Summary  given and reviewed.

## 2021-11-23 NOTE — PROGRESS NOTES
@[facility]@    [facility]    Progress Note    Date of patient's visit: 11/23/2021    Patient's Name:  Shanda Quinn    YOB: 1980            Patient Care Team:  Ev Garcia MD as PCP - General (Internal Medicine)  Ev Garcia MD as PCP - [facility] Empaneled Provider  Tess Layne MD as Consulting Physician (Pulmonology)    REASON FOR VISIT: Routine outpatient follow     Chief Complaint   Patient presents with    Hand Pain     left hand pain x3 weeks    Health Maintenance     pt plans to get covid vaccine         HISTORY OF PRESENT ILLNESS:    History was obtained from the patient. Shanda Quinn is a 45 y.o. is here for bilateral hand pain. It started 3 months back. Started with tingling numbness burning sensation and weakness in the radial half of her hand. Started on the right now she is developing symptoms on her left hand too. Patient mentioned that it has been worsening in the last 1 month interfering with her daily activities. She is unable to unscrew bottle caps and she has been dropping things. Denies neck pain/radiculopathy or injury to her back. Quit alcohol this summer. Does not smoke cigarettes or use substance. 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 She has stopped that work now.     Past Medical History:   Diagnosis Date    Acid reflux     Gastric reflux     Hernia of abdominal wall     History of anxiety     history of panic attacks    Hypertension     Migraine     Neuropathy     Obesities, morbid ([address] 8/31/2018    TINO (obstructive sleep apnea) 12/17/2018    USES CPAP    Suicide attempt ([address]     Thyroid disease     Wears glasses        Past Surgical History:   Procedure Laterality Date    ABDOMEN SURGERY      ABDOMINOPLASTY  04/01/2021    ABDOMINOPLASTY WITH INCISIONAL WOUND VAC    ABDOMINOPLASTY N/A 4/1/2021    ABDOMINOPLASTY WITH INCISIONAL WOUND VAC AND BIOPATCH AROUND ELIZABETH DRAIN AND PRE OP TAP BLOCK performed by Melanie Ivan MD at [address] [facility], LAPAROSCOPIC  10/07/2019    Laparoscopic cholecystectomy    CHOLECYSTECTOMY, LAPAROSCOPIC N/A 10/7/2019    XI LAPAROSCOPIC ROBOTIC CHOLECYSTECTOMY performed by Hermila Varela DO at [facility], DIAGNOSTIC      LIPECTOMY  04/01/2021    LIPECTOMY N/A 4/1/2021    PANNICULECTOMY performed by Melanie Ivan MD at [address] [facility]  04/01/2019    ROBOTIC LAPAROSCOPIC GASTRIC BYPASS ZAID-EN-Y, LIVER BIOPSY, EGD     ZAID-EN-Y GASTRIC BYPASS N/A 4/1/2019    XI ROBOTIC LAPAROSCOPIC GASTRIC BYPASS ZAID-EN-Y, LIVER BIOPSY, EGD performed by Hermila Varela DO at [address] [facility]  40/80/7395    UMBILICAL HERNIA REPAIR N/A 4/1/2200    HERNIA UMBILICAL REPAIR performed by Danette Keller DO at [address] 12/7/2018    EGD ESOPHAGOGASTRODUODENOSCOPY performed by Hermila Varela DO at [address] [facility] N/A 6/14/2019    egd  performed by Hermila Varela DO at [facility]    WISDOM TOOTH EXTRACTION           ALLERGIES      Allergies   Allergen Reactions    Latex Hives and Itching       MEDICATIONS:      Current Outpatient Medications on File Prior to Visit   Medication Sig Dispense Refill    ibuprofen (ADVIL) 200 MG tablet Take 2 tablets by mouth every 6 hours as needed for Pain (Patient not taking: Reported on 11/23/2021) 120 tablet 3    famotidine (PEPCID) 20 MG tablet Take 1 tablet by mouth 2 times daily (Patient not taking: Reported on 11/23/2021) 60 tablet 0    SODIUM CHLORIDE, EXTERNAL, (WOUND WASH SALINE) 0.9 % SOLN Apply 30 mLs topically daily (Patient not taking: Reported on 11/23/2021) 210 mL 5    cephALEXin (KEFLEX) 500 MG capsule Take 1 capsule by mouth 4 times daily (Patient not taking: Reported on 5/12/2021) 40 capsule 0    baclofen (LIORESAL) 10 MG tablet Take 1 tablet by mouth 3 times daily (Patient not taking: Reported on 11/23/2021) 30 tablet 0    docusate sodium (COLACE) 100 MG capsule Take 1 capsule by mouth 2 times daily as needed for Constipation (Patient not taking: Reported on 11/2/2021) 60 capsule 3    pantoprazole (PROTONIX) 40 MG tablet Take 1 tablet by mouth daily (Patient not taking: Reported on 11/2/2021) 60 tablet 3    promethazine (PHENERGAN) 25 MG tablet Take 1 tablet by mouth every 8 hours as needed for Nausea (Patient not taking: Reported on 11/2/2021) 30 tablet 2    vitamin D (ERGOCALCIFEROL) 1.25 MG (48068 UT) CAPS capsule Take 1 capsule by mouth once a week for 12 doses 12 capsule 0    vitamin D (ERGOCALCIFEROL) 1.25 MG (76833 UT) CAPS capsule Take 1 capsule by mouth once a week (Patient not taking: Reported on 11/2/2021) 12 capsule 0    calcium carbonate (CALCIUM 600) 600 MG TABS tablet Take 1 tablet by mouth 2 times daily (Patient not taking: Reported on 11/2/2021) 270 tablet 2    PARoxetine (PAXIL) 10 MG tablet Take 0.5 tablets by mouth every morning for 7 days, THEN 1 tablet every morning for 23 days. 27 tablet 0    traZODone (DESYREL) 50 MG tablet Take 0.5 tablets by mouth nightly as needed for Sleep (Patient not taking: Reported on 11/23/2021) 15 tablet 0    Multiple Vitamins-Minerals (CELEBRATE MULTI-COMPLETE 36) CHEW Take 1 tablet by mouth 2 times daily (Patient not taking: Reported on 11/2/2021) 60 tablet 5    calcium carbonate (TUMS) 500 MG chewable tablet Take 1 tablet by mouth 2 times daily (Patient not taking: Reported on 11/2/2021) 60 tablet 5     No current facility-administered medications on file prior to visit. SOCIAL HISTORY    Reviewed and no change from previous record. Venu  reports that she is a non-smoker but has been exposed to tobacco smoke.  She has never used smokeless tobacco.    FAMILY HISTORY:    Reviewed and No change from previous no icterus no redness    LABORATORY FINDINGS:    CBC:  Lab Results   Component Value Date    WBC 4.3 09/27/2021    HGB 11.3 09/27/2021     09/27/2021     BMP:    Lab Results   Component Value Date     09/27/2021    K 4.0 09/27/2021     09/27/2021    CO2 22 09/27/2021    BUN 9 09/27/2021    CREATININE 0.43 09/27/2021    GLUCOSE 113 09/27/2021     HEMOGLOBIN A1C:   Lab Results   Component Value Date    LABA1C 4.8 03/09/2021     MICROALBUMIN URINE: No results found for: MICROALBUR  FASTING LIPID PANEL:  Lab Results   Component Value Date    CHOL 120 09/11/2020    HDL 42 09/11/2020    TRIG 114 09/11/2020     Lab Results   Component Value Date    LDLCHOLESTEROL 55 09/11/2020       LIVER PROFILE:  Lab Results   Component Value Date    ALT 27 03/09/2021    AST 25 03/09/2021    PROT 6.7 03/09/2021    BILITOT 0.77 03/09/2021    BILIDIR 0.22 06/13/2019    LABALBU 4.1 03/09/2021      THYROID FUNCTION:   Lab Results   Component Value Date    TSH 1.21 03/09/2021      URINE ANALYSIS: No results found for: LABURIN  ASSESSMENT AND PLAN:    1. Bilateral carpal tunnel syndrome  Recommended Tylenol for pain  Will order forearm-based splint for carpal tunnel syndrome  We will obtain nerve conduction study  Referred to Ortho  for possible steroid injection          Health Maintenance      FOLLOW UP AND INSTRUCTIONS:   No follow-ups on file. 1. Fred Hayes received counseling on the following healthy behaviors: nutrition, exercise and medication adherence    2. Reviewed prior labs and health maintenance. 3. Discussed use, benefit, and side effects of prescribed medications. Barriers to medication compliance addressed. All patient questions answered. Pt voiced understanding.      4. Patient given educational materials - see patient instructions         Williemae Cheadle MD  PGY-3, Internal Medicine Resident  [facility]  11/23/2021 10:52 AM

## 2021-11-23 NOTE — PROGRESS NOTES
Attending Physician Statement  I have discussed the care of Tala Walton including pertinent history and exam findings,  with the resident. I have reviewed the key elements of all parts of the encounter with the resident. I agree with the assessment, plan and orders as documented by the resident.   (GE Modifier)    MD DONAL Justice  Attending Physician, [address] [place] [address], Internal Medicine Residency Program  [address] [place] [address]  11/23/2021, 11:47 AM

## 2021-11-27 ENCOUNTER — HOSPITAL ENCOUNTER (OUTPATIENT)
Dept: LAB | Age: 41
Setting detail: SPECIMEN
Discharge: HOME | End: 2021-11-27
Payer: MEDICARE

## 2021-11-27 DIAGNOSIS — Z20.822 COVID-19 RULED OUT BY LABORATORY TESTING: Primary | ICD-10-CM

## 2021-11-27 PROCEDURE — U0003 INFECTIOUS AGENT DETECTION BY NUCLEIC ACID (DNA OR RNA); SEVERE ACUTE RESPIRATORY SYNDROME CORONAVIRUS 2 (SARS-COV-2) (CORONAVIRUS DISEASE [COVID-19]), AMPLIFIED PROBE TECHNIQUE, MAKING USE OF HIGH THROUGHPUT TECHNOLOGIES AS DESCRIBED BY CMS-2020-01-R: HCPCS

## 2021-11-27 PROCEDURE — U0005 INFEC AGEN DETEC AMPLI PROBE: HCPCS

## 2021-11-28 LAB
SARS-COV-2: NORMAL
SARS-COV-2: NOT DETECTED
SOURCE: NORMAL

## 2022-01-01 ENCOUNTER — HOSPITAL ENCOUNTER (EMERGENCY)
Age: 42
Discharge: HOME | End: 2022-01-01
Attending: EMERGENCY MEDICINE
Payer: MEDICARE

## 2022-01-01 ENCOUNTER — APPOINTMENT (OUTPATIENT)
Dept: GENERAL RADIOLOGY | Age: 42
End: 2022-01-01
Payer: MEDICARE

## 2022-01-01 VITALS
OXYGEN SATURATION: 93 % | WEIGHT: 178 LBS | HEART RATE: 87 BPM | DIASTOLIC BLOOD PRESSURE: 78 MMHG | SYSTOLIC BLOOD PRESSURE: 126 MMHG | HEIGHT: 59 IN | BODY MASS INDEX: 35.88 KG/M2 | TEMPERATURE: 101 F | RESPIRATION RATE: 23 BRPM

## 2022-01-01 DIAGNOSIS — U07.1 COVID-19: Primary | ICD-10-CM

## 2022-01-01 LAB
SARS-COV-2: ABNORMAL
SARS-COV-2: DETECTED
SOURCE: ABNORMAL

## 2022-01-01 PROCEDURE — 99284 EMERGENCY DEPT VISIT MOD MDM: CPT

## 2022-01-01 PROCEDURE — 93005 ELECTROCARDIOGRAM TRACING: CPT | Performed by: GENERAL PRACTICE

## 2022-01-01 PROCEDURE — U0005 INFEC AGEN DETEC AMPLI PROBE: HCPCS

## 2022-01-01 PROCEDURE — U0003 INFECTIOUS AGENT DETECTION BY NUCLEIC ACID (DNA OR RNA); SEVERE ACUTE RESPIRATORY SYNDROME CORONAVIRUS 2 (SARS-COV-2) (CORONAVIRUS DISEASE [COVID-19]), AMPLIFIED PROBE TECHNIQUE, MAKING USE OF HIGH THROUGHPUT TECHNOLOGIES AS DESCRIBED BY CMS-2020-01-R: HCPCS

## 2022-01-01 PROCEDURE — 6370000000 HC RX 637 (ALT 250 FOR IP): Performed by: GENERAL PRACTICE

## 2022-01-01 PROCEDURE — 71045 X-RAY EXAM CHEST 1 VIEW: CPT

## 2022-01-01 RX ORDER — IBUPROFEN 800 MG/1
800 TABLET ORAL ONCE
Status: DISCONTINUED | OUTPATIENT
Start: 2022-01-01 | End: 2022-01-01 | Stop reason: HOSPADM

## 2022-01-01 RX ORDER — ACETAMINOPHEN 500 MG
1000 TABLET ORAL ONCE
Status: COMPLETED | OUTPATIENT
Start: 2022-01-01 | End: 2022-01-01

## 2022-01-01 RX ORDER — IBUPROFEN 800 MG/1
800 TABLET ORAL EVERY 6 HOURS PRN
Qty: 21 TABLET | Refills: 0 | Status: SHIPPED | OUTPATIENT
Start: 2022-01-01

## 2022-01-01 RX ORDER — ACETAMINOPHEN 325 MG/1
650 TABLET ORAL EVERY 6 HOURS PRN
Qty: 120 TABLET | Refills: 0 | Status: SHIPPED | OUTPATIENT
Start: 2022-01-01

## 2022-01-01 RX ADMIN — ACETAMINOPHEN 1000 MG: 500 TABLET ORAL at 06:25

## 2022-01-01 ASSESSMENT — PAIN SCALES - GENERAL: PAINLEVEL_OUTOF10: 2

## 2022-01-01 NOTE — ED PROVIDER NOTES
[facility]     Emergency Department     Faculty Attestation    I performed a history and physical examination of the patient and discussed management with the resident. I have reviewed and agree with the residents findings including all diagnostic interpretations, and treatment plans as written. Any areas of disagreement are noted on the chart. I was personally present for the key portions of any procedures. I have documented in the chart those procedures where I was not present during the key portions. I have reviewed the emergency nurses triage note. I agree with the chief complaint, past medical history, past surgical history, allergies, medications, social and family history as documented unless otherwise noted below. Documentation of the HPI, Physical Exam and Medical Decision Making performed by scribkylie is based on my personal performance of the HPI, PE and MDM. For Physician Assistant/ Nurse Practitioner cases/documentation I have personally evaluated this patient and have completed at least one if not all key elements of the E/M (history, physical exam, and MDM). Additional findings are as noted. 36 yo F c/o sob, fever, exposed to covid, no covid immunization, no vomit,   pe febrile, alert, no acute distress speaking in complete sentences,   gcs 15,     cxr pending, probable discharge care turned over to day shift      EKG Interpretation    Interpreted by me  Normal sinus, hr 91, no ischemia, normal axis, qtc 440    CRITICAL CARE: There was a high probability of clinically significant/life threatening deterioration in this patient's condition which required my urgent intervention. Total critical care time was 5 minutes. This excludes any time for separately reportable procedures.        Christopher 24, DO  01/01/22 [id number]       Isamar Session, DO  01/01/22 1172 Paulie Maier, DO  01/01/22 8317

## 2022-01-01 NOTE — ED NOTES
The following labs labeled with pt sticker and tubed to lab:     [] Blue     [] Lavender   [] on ice  [] Green/yellow  [] Green/black [] on ice  [] Yellow  [] Red  [] Pink      [x] COVID-19 swab    [] Rapid  [x] PCR  [] Flu swab  [] Peds Viral Panel     [] Urine Sample  [] Pelvic Cultures  [] Blood Cultures            George Carlin RN  01/01/22 [id number]

## 2022-01-01 NOTE — ED TRIAGE NOTES
Pt presents to ED from home c/o malaise, SOB, nausea, cough, chills,  Muscle pain, and sore throat x 1-2 days. Pt has been around coworkers and family who are sick. .    Pt is A&OX4, placed on full monitor, EKG done. Pt SPO2 is 94% RA, resp even and unlabored. Pt is febrile upon arrival , 101 F.  Pt changed into a gown, given a sheet

## 2022-01-01 NOTE — ED PROVIDER NOTES
[facility]  ED  Emergency Department Encounter  EmergencyMedicine Resident     Pt Bobby Smith  MRN: [id number]  [place] 1980  Date of evaluation: 1/1/22  PCP:  Dorita Desouza MD    [address] [place] [address]       Chief Complaint   Patient presents with    Shortness of Breath       HISTORY OF PRESENT ILLNESS  (Location/Symptom, Timing/Onset, Context/Setting, Quality, Duration, Modifying Factors, Severity.)      Venkat Hayes is a 37 y.o. female who presents with fever, chills, myalgias, cough and recent exposure to COVID-19. She feels she may have contracted. She does report some shortness of breath with deep inspiration, however she is not dyspneic at rest.  Oxygen saturation is 93% on room air and is not dropping with activity. Patient is febrile to 101 currently. She takes no hormone replacement therapy,, no history of PE or DVT in the past.  No unilateral leg swelling or leg pain. PAST MEDICAL / SURGICAL / SOCIAL / FAMILY HISTORY      has a past medical history of Gastric reflux, Hernia of abdominal wall, History of anxiety, Hypertension, Migraine, Neuropathy, Obesities, morbid ([address] TINO (obstructive sleep apnea), Suicide attempt ([address] Thyroid disease, and Wears glasses. Denies further past medical hx     has a past surgical history that includes Tubal ligation; Upper gastrointestinal endoscopy (N/A, 12/7/2018); [place] tooth extraction; Sukhjinder-en-Y Gastric Bypass (04/01/2019); Sukhjinder-en-Y Gastric Bypass (N/A, 4/1/2019); Upper gastrointestinal endoscopy (N/A, 6/14/2019); Abdomen surgery; Cholecystectomy, laparoscopic (10/07/2019); Cholecystectomy, laparoscopic (N/A, 10/7/2019); Colonoscopy; Endoscopy, colon, diagnostic; lipectomy (04/01/2021); Abdominoplasty (04/01/2021); Umbilical hernia repair (04/01/2021); lipectomy (N/A, 4/1/2021); Abdominoplasty (N/A, 5/1/4545); and Umbilical hernia repair (N/A, 4/1/2021).   Denies further past surgical hx    Social History     Socioeconomic History    Marital status:      Spouse name: Not on file    Number of children: Not on file    Years of education: Not on file    Highest education level: Not on file   Occupational History    Not on file   Tobacco Use    Smoking status: Passive Smoke Exposure - Never Smoker    Smokeless tobacco: Never Used   Vaping Use    Vaping Use: Never used   Substance and Sexual Activity    Alcohol use: Yes     Comment: social     Drug use: Not Currently     Comment: drug use history as a teen    Sexual activity: Yes   Other Topics Concern    Not on file   Social History Narrative    Not on file     Social Determinants of Health     Financial Resource Strain: Low Risk     Difficulty of Paying Living Expenses: Not hard at all   Food Insecurity: No Food Insecurity    Worried About [employer] [employer] in the Last Year: Never true    [employer] [employer] in the Last Year: Never true   Transportation Needs:     Lack of Transportation (Medical): Not on file    Lack of Transportation (Non-Medical):  Not on file   Physical Activity:     Days of Exercise per Week: Not on file    Minutes of Exercise per Session: Not on file   Stress:     Feeling of Stress : Not on file   Social Connections:     Frequency of Communication with Friends and Family: Not on file    Frequency of Social Gatherings with Friends and Family: Not on file    Attends Jain Services: Not on file    Active Member of [address] [place] [address] [employer] or Organizations: Not on file    Attends Club or Organization Meetings: Not on file    Marital Status: Not on file   Intimate Partner Violence:     Fear of Current or Ex-Partner: Not on file    Emotionally Abused: Not on file    Physically Abused: Not on file    Sexually Abused: Not on file   Housing Stability:     Unable to Pay for Housing in the Last Year: Not on file    Number of [place] in the Last Year: Not on file    Unstable Housing in the Last Year: Not on file       Family History   Problem Relation Age of Onset    Thyroid Disease Mother     Diabetes Mother     Heart Disease Mother     High Blood Pressure Mother    Jonas Migraines Mother     Diabetes Father     Heart Disease Father     Thyroid Disease Father     Liver Disease Father     High Blood Pressure Father     Cancer Father         liver    Alcohol Abuse Father     Depression Father     Thyroid Disease Sister     Neuropathy Sister     High Blood Pressure Sister     Kidney Disease Paternal Aunt     Heart Disease Paternal Uncle     Migraines Maternal Grandmother     Stroke Maternal Grandfather     Cancer Maternal Grandfather         breast    Other Paternal Grandmother         epilepsy    Diabetes Paternal Grandmother     High Blood Pressure Paternal Grandmother     Diabetes Paternal Grandfather     High Blood Pressure Paternal Grandfather     Heart Disease Paternal Grandfather        Allergies:  Latex    Home Medications:  Prior to Admission medications    Medication Sig Start Date End Date Taking?  Authorizing Provider   ibuprofen (IBU) 800 MG tablet Take 1 tablet by mouth every 6 hours as needed for Pain 1/1/22  Yes Yvonne Gorman DO   acetaminophen (TYLENOL) 325 MG tablet Take 2 tablets by mouth every 6 hours as needed for Pain 1/1/22  Yes Yvonne Gorman DO   famotidine (PEPCID) 20 MG tablet Take 1 tablet by mouth 2 times daily  Patient not taking: Reported on 11/23/2021 9/30/21   Alda Ramriez DO   SODIUM CHLORIDE, EXTERNAL, (WOUND [address] SALINE) 0.9 % SOLN Apply 30 mLs topically daily  Patient not taking: Reported on 11/23/2021 5/7/21   Jairo Huitron MD   cephALEXin (KEFLEX) 500 MG capsule Take 1 capsule by mouth 4 times daily  Patient not taking: Reported on 5/12/2021 4/1/21   Jairo Huitron MD   baclofen (LIORESAL) 10 MG tablet Take 1 tablet by mouth 3 times daily  Patient not taking: Reported on 11/23/2021 4/1/21   Jairo Huitron MD   docusate sodium (COLACE) 100 MG capsule Take 1 capsule by mouth 2 times daily as needed for Constipation  Patient not taking: Reported on 11/2/2021 3/9/21   Jada Gastelum MD   pantoprazole (PROTONIX) 40 MG tablet Take 1 tablet by mouth daily  Patient not taking: Reported on 11/2/2021 3/9/21   Jada Gastelum MD   promethazine (PHENERGAN) 25 MG tablet Take 1 tablet by mouth every 8 hours as needed for Nausea  Patient not taking: Reported on 11/2/2021 3/9/21   Jada Gastelum MD   vitamin D (ERGOCALCIFEROL) 1.25 MG (99888 UT) CAPS capsule Take 1 capsule by mouth once a week for 12 doses 3/9/21 5/26/21  MIKO Metcalf CNP   vitamin D (ERGOCALCIFEROL) 1.25 MG (32490 UT) CAPS capsule Take 1 capsule by mouth once a week  Patient not taking: Reported on 11/2/2021 3/9/21   Jada Gastelum MD   calcium carbonate (CALCIUM 600) 600 MG TABS tablet Take 1 tablet by mouth 2 times daily  Patient not taking: Reported on 11/2/2021 3/9/21   Jada Gastelum MD   PARoxetine (PAXIL) 10 MG tablet Take 0.5 tablets by mouth every morning for 7 days, THEN 1 tablet every morning for 23 days. 11/20/20 1/27/21  MIKO Das NP   traZODone (DESYREL) 50 MG tablet Take 0.5 tablets by mouth nightly as needed for Sleep  Patient not taking: Reported on 11/23/2021 11/20/20   MIKO Das NP   Multiple Vitamins-Minerals (CELEBRATE MULTI-COMPLETE 36) CHEW Take 1 tablet by mouth 2 times daily  Patient not taking: Reported on 11/2/2021 5/15/20   Jada Gastelum MD   calcium carbonate (TUMS) 500 MG chewable tablet Take 1 tablet by mouth 2 times daily  Patient not taking: Reported on 11/2/2021 5/15/20   Jada Gastelum MD       REVIEW OF SYSTEMS    (2-9 systems for level 4, 10 or more for level 5)      Review of Systems    Review of Systems   Constitutional: Positive for fever  HENT: Negative for sore throat. Eyes: Negative for pain. Respiratory: Positive for cough  Cardiovascular: Negative for chest pain and palpitations. Gastrointestinal: Negative for abdominal pain, nausea and vomiting. Genitourinary: Negative for dysuria. Musculoskeletal: Negative for gait problem. Skin: Negative for wound. Neurological: Negative for headaches. PHYSICAL EXAM   (up to 7 for level 4, 8 or more for level 5)      INITIAL VITALS:   /78   Pulse 87   Temp 101 °F (38.3 °C) (Oral)   Resp 23   Ht 4' 11\" (1.499 m)   Wt 178 lb (80.7 kg)   SpO2 93%   BMI 35.95 kg/m²     Physical Exam   Gen. Appearance: patient appears well, nondistressed. Head: head atraumatic, normocephalic. Eyes: Extraocular movements intact. No scleral icterus  Mouth: Oropharynx clear and moist.  No oral lesions  Neck: Supple. No lymphadenopathy. Pulmonary: Lungs clear to auscultation bilaterally. No wheezing, rales or rhonchi   Cardiovascular: Regular rate and rhythm, no murmurs   Abdomen: Soft, nontender, no guarding or rebound, normal bowel sounds  Neurology: GCS 15. Oriented to person place and time. moving all extremities   Skin: Warm, dry, well perfused        DIFFERENTIAL  DIAGNOSIS     PLAN (LABS / IMAGING / EKG):  Orders Placed This Encounter   Procedures    XR CHEST PORTABLE    COVID-19    EKG 12 Lead       MEDICATIONS ORDERED:  Orders Placed This Encounter   Medications    DISCONTD: ibuprofen (ADVIL;MOTRIN) tablet 800 mg    acetaminophen (TYLENOL) tablet 1,000 mg    ibuprofen (IBU) 800 MG tablet     Sig: Take 1 tablet by mouth every 6 hours as needed for Pain     Dispense:  21 tablet     Refill:  0    acetaminophen (TYLENOL) 325 MG tablet     Sig: Take 2 tablets by mouth every 6 hours as needed for Pain     Dispense:  120 tablet     Refill:  0           DIAGNOSTIC RESULTS / EMERGENCY DEPARTMENT COURSE / MDM     LABS:  Results for orders placed or performed during the hospital encounter of 01/01/22   COVID-19    Specimen: Nasopharyngeal Swab   Result Value Ref Range    SARS-CoV-2          Source . NASOPHARYNGEAL SWAB     SARS-CoV-2 DETECTED (A) Not Detected       RADIOLOGY:  XR CHEST PORTABLE   Final Result   Mild left basilar patchy opacification. Finding may represent atelectasis or   early consolidation in the appropriate clinical setting. EKG  None    All EKG's are interpreted by the Emergency Department Physician who either signs or Co-signs this chart in the absence of a cardiologist.    [address] [place] [address] MDM:          ED Course as of 01/01/22 1502   Sat Jan 01, 2022   2510 Likely COVID-19. Recommend patient quarantine until results are back. Nonrapid Covid obtained. Fever treated with Tylenol and Motrin, portable chest x-ray obtained to rule out bacterial superimposed pneumonia. [DYLLAN]      ED Course User Index  [DYLLAN] Sadiq Dupree DO         PROCEDURES:  None    CONSULTS:  None    CRITICAL CARE:  None    FINAL IMPRESSION      1. COVID-19              DISPOSITION / PLAN     DISPOSITION Decision To Discharge 01/01/2022 07:36:45 AM      PATIENT REFERRED TO:  No follow-up provider specified.     DISCHARGE MEDICATIONS:  Discharge Medication List as of 1/1/2022  7:37 AM          Sadiq Dupree DO  Emergency Medicine Resident    (Please note that portions of thisnote were completed with a voice recognition program.  Efforts were made to edit the dictations but occasionally words are mis-transcribed.)      Sadiq Dupree DO  Resident  01/01/22 1142

## 2022-01-03 ENCOUNTER — CARE COORDINATION (OUTPATIENT)
Dept: CARE COORDINATION | Age: 42
End: 2022-01-03

## 2022-01-03 LAB
EKG ATRIAL RATE: 91 BPM
EKG P AXIS: 39 DEGREES
EKG P-R INTERVAL: 154 MS
EKG Q-T INTERVAL: 358 MS
EKG QRS DURATION: 100 MS
EKG QTC CALCULATION (BAZETT): 440 MS
EKG R AXIS: 15 DEGREES
EKG T AXIS: 29 DEGREES
EKG VENTRICULAR RATE: 91 BPM

## 2022-01-03 PROCEDURE — 93010 ELECTROCARDIOGRAM REPORT: CPT | Performed by: INTERNAL MEDICINE

## 2022-01-03 NOTE — ACP (ADVANCE CARE PLANNING)
Advance Care Planning   Healthcare Decision Maker:    Primary Decision Maker: Meenu Thompson - [phone]    Click here to complete Healthcare Decision Makers including selection of the Healthcare Decision Maker Relationship (ie \"Primary\"). Today we documented Decision Maker(s) consistent with Legal Next of Kin hierarchy.

## 2022-01-03 NOTE — CARE COORDINATION
Patient contacted regarding COVID-19 diagnosis. Discussed COVID-19 related testing which was available at this time. Test results were positive. Patient informed of results, if available? Yes. LPN Care Coordinator contacted the patient by telephone to perform post discharge assessment. Call within 2 business days of discharge: Yes. Verified name and  with patient as identifiers. Provided introduction to self, and explanation of the CTN/ACM role, and reason for call due to risk factors for infection and/or exposure to COVID-19. Symptoms reviewed with patient who verbalized the following symptoms: no new symptoms and no worsening symptoms. Due to no new or worsening symptoms encounter was not routed to provider for escalation. Discussed follow-up appointments. If no appointment was previously scheduled, appointment scheduling offered: No.  [facility] follow up appointment(s):   Future Appointments   Date Time Provider Tl Reaves   2/15/2022  2:00  [facility]  [facility]   2/15/2022  2:00 PM Simin Cabrera MD [address]     Non-[facility] follow up appointment(s): no    Non-face-to-face services provided:  Obtained and reviewed discharge summary and/or continuity of care documents     Advance Care Planning:   Does patient have an Advance Directive:  reviewed and current. Educated patient about risk for severe COVID-19 due to risk factors according to CDC guidelines. LPN CC reviewed discharge instructions, medical action plan and red flag symptoms with the patient who verbalized understanding. Discussed COVID vaccination status: No. Education provided on COVID-19 vaccination as appropriate. Discussed exposure protocols and quarantine with CDC Guidelines. Patient was given an opportunity to verbalize any questions and concerns and agrees to contact LPN CC or health care provider for questions related to their healthcare.     Reviewed and educated patient on any new and changed medications related to discharge diagnosis     Was patient discharged with a pulse oximeter? No Discussed and confirmed pulse oximeter discharge instructions and when to notify provider or seek emergency care. LPN CC provided contact information. No further follow-up call identified based on severity of symptoms and risk factors.

## 2022-01-24 ENCOUNTER — HOSPITAL ENCOUNTER (OUTPATIENT)
Dept: LAB | Age: 42
Setting detail: SPECIMEN
Discharge: HOME | End: 2022-01-24

## 2022-01-24 DIAGNOSIS — Z20.822 COVID-19 RULED OUT BY LABORATORY TESTING: Primary | ICD-10-CM

## 2022-03-14 ENCOUNTER — OFFICE VISIT (OUTPATIENT)
Dept: ORTHOPEDIC SURGERY | Age: 42
End: 2022-03-14
Payer: MEDICARE

## 2022-03-14 VITALS — BODY MASS INDEX: 31.04 KG/M2 | HEIGHT: 59 IN | WEIGHT: 154 LBS

## 2022-03-14 DIAGNOSIS — G56.01 CARPAL TUNNEL SYNDROME OF RIGHT WRIST: Primary | ICD-10-CM

## 2022-03-14 PROCEDURE — 99202 OFFICE O/P NEW SF 15 MIN: CPT | Performed by: ORTHOPAEDIC SURGERY

## 2022-03-14 PROCEDURE — G8417 CALC BMI ABV UP PARAM F/U: HCPCS | Performed by: ORTHOPAEDIC SURGERY

## 2022-03-14 PROCEDURE — G8484 FLU IMMUNIZE NO ADMIN: HCPCS | Performed by: ORTHOPAEDIC SURGERY

## 2022-03-14 PROCEDURE — 1036F TOBACCO NON-USER: CPT | Performed by: ORTHOPAEDIC SURGERY

## 2022-03-14 PROCEDURE — G8427 DOCREV CUR MEDS BY ELIG CLIN: HCPCS | Performed by: ORTHOPAEDIC SURGERY

## 2022-03-14 NOTE — PROGRESS NOTES
This 50-year-old patient is seen here for complaint of pain and paresthesia in the right hand fingers. She occasionally gets it in the left hand but this the left hand is completely tolerable. The pain and numbness and tingling she feels is in the radial 3-1/2 digits. It does wake her up at night. She does wear a brace at night and it does help her. The pain sometimes radiates up to the medial epicondyle. Does wake her up at she is tries to keep squeezing her hand. She works at [employer] and does repetitive work with it. Sometimes has to lift heavy load. She is not allowed to wear the brace at work unless she has a doctor's note. Patient is not diabetic. Examination: She has full range of motion in the elbow as well as the wrist.  Phalen test was positive on the right side negative on the left side. Diagnosis: Right carpal tunnel syndrome. Possible left carpal tunnel. Treatment: Since the brace is working we will give her a note that she should continue to wear the brace at work as well.   See her again in 2 weeks time and if she is not responding to this then we will inject the.

## 2022-03-14 NOTE — LETTER
[facility]  [address] [facility]  Phone: [phone]  Fax: [phone]    Alexis Welch MD        March 14, 2022     Patient: Joelle Joe   YOB: 1980   Date of Visit: 3/14/2022       To Whom It May Concern: It is my medical opinion that Davonte Smallwood may return to work on 03/14/2022. Ronny Liao is advised to wear a brace on her right wrist during work hours. Thank you for accommodating this request.    If you have any questions or concerns, please don't hesitate to call.     Sincerely,        Alexis Welch MD

## 2022-05-09 ENCOUNTER — TELEPHONE (OUTPATIENT)
Dept: INTERNAL MEDICINE | Age: 42
End: 2022-05-09

## 2022-05-09 NOTE — LETTER
[facility] [address]  [address] [address]  Phone: [phone]  Fax: [phone]    Ivory Miranda MD        May 9, 2022    Meera Poster  [address]      Dear Chadwick Valdovinos: We were unable to reach you by phone. It is important that you contact us by calling [phone], at your earliest convenience. This message is regarding your scheduling follow up appt. If you have any questions or concerns, please don't hesitate to call.     Sincerely,        Ivory Miranda MD

## 2023-02-14 NOTE — PROGRESS NOTES
Visit Information    Have you changed or started any medications since your last visit including any over-the-counter medicines, vitamins, or herbal medicines? no   Have you stopped taking any of your medications? Is so, why? -  yes -   Are you having any side effects from any of your medications? - yes - Lisinopril    Have you seen any other physician or provider since your last visit?  no   Have you had any other diagnostic tests since your last visit?  no   Have you been seen in the emergency room and/or had an admission in a hospital since we last saw you?  no   Have you had your routine dental cleaning in the past 6 months?  no     Do you have an active [employer] account? If no, what is the barrier?   Yes    Patient Care Team:  Julian Park MD as PCP - General    Medical History Review  Past Medical, Family, and Social History reviewed and does contribute to the patient presenting condition    Health Maintenance   Topic Date Due    Cervical cancer screen  12/12/2002    Potassium monitoring  05/30/2018    Creatinine monitoring  05/30/2018    Flu vaccine (1) 09/01/2018    DTaP/Tdap/Td vaccine (2 - Td) 06/30/2023    HIV screen  Completed
shot     Plan:   1. Will discontinue lisinopril and order HCTZ 12.5 mg. We will also order blood pressure monitor. She needs to get blood work done that was previously ordered, she already has papers for it. 2.  Follow up with weight management  3. Follow-up with neurology  4. Heel exercises. Podiatry referral  5. Flu shot today  6. She does not smoke  7. Return in about 4 weeks (around 10/17/2018) for please cancel appt on 9/28/18.       Paula Velazco MD
Clothing

## 2023-05-03 NOTE — TELEPHONE ENCOUNTER
Patient is requesting a call as soon as possible regarding her ekg and cn be reached at [phone]. Okay to leave a message. Patient

## 2023-09-16 ENCOUNTER — APPOINTMENT (OUTPATIENT)
Dept: GENERAL RADIOLOGY | Age: 43
End: 2023-09-16
Payer: MEDICAID

## 2023-09-16 ENCOUNTER — HOSPITAL ENCOUNTER (EMERGENCY)
Age: 43
Discharge: HOME | End: 2023-09-16
Attending: EMERGENCY MEDICINE
Payer: MEDICAID

## 2023-09-16 VITALS
RESPIRATION RATE: 16 BRPM | HEART RATE: 68 BPM | TEMPERATURE: 97.2 F | SYSTOLIC BLOOD PRESSURE: 140 MMHG | DIASTOLIC BLOOD PRESSURE: 83 MMHG | OXYGEN SATURATION: 100 %

## 2023-09-16 DIAGNOSIS — M25.422 PAIN AND SWELLING OF LEFT ELBOW: Primary | ICD-10-CM

## 2023-09-16 DIAGNOSIS — M25.522 PAIN AND SWELLING OF LEFT ELBOW: Primary | ICD-10-CM

## 2023-09-16 PROCEDURE — 99283 EMERGENCY DEPT VISIT LOW MDM: CPT

## 2023-09-16 PROCEDURE — 6370000000 HC RX 637 (ALT 250 FOR IP): Performed by: STUDENT IN AN ORGANIZED HEALTH CARE EDUCATION/TRAINING PROGRAM

## 2023-09-16 PROCEDURE — 73080 X-RAY EXAM OF ELBOW: CPT

## 2023-09-16 RX ORDER — IBUPROFEN 400 MG/1
400 TABLET ORAL ONCE
Status: COMPLETED | OUTPATIENT
Start: 2023-09-16 | End: 2023-09-16

## 2023-09-16 RX ADMIN — IBUPROFEN 400 MG: 400 TABLET, FILM COATED ORAL at 08:18

## 2023-09-16 ASSESSMENT — PAIN - FUNCTIONAL ASSESSMENT: PAIN_FUNCTIONAL_ASSESSMENT: 0-10

## 2023-09-16 ASSESSMENT — PAIN SCALES - GENERAL
PAINLEVEL_OUTOF10: 10
PAINLEVEL_OUTOF10: 10

## 2023-09-16 NOTE — ED TRIAGE NOTES
Pt presents to ED room 06 ambulatory from triage c/o L elbow pain. Pt states that she hit her elbow the other day and the pain has worsened since then. Pt states she hit her elbow again today and states she has been taking tylenol but it has not touched the pain.

## 2023-09-16 NOTE — DISCHARGE INSTRUCTIONS
You are seen the emergency department for left elbow pain. We obtained x-rays which showed no signs of fracture or dislocation. Can take Tylenol as needed to help with the pain. Avoid resting her elbows on the table or holding them in the bent position for prolonged period of time as this can worsen the nerve pain. Can use ice intermittently to help with the swelling. Return to emergency department if you develop severe worsening pain, redness of the joint, fevers, inability to bend your elbow, pain spreading up into your shoulder or down to your fingers, change in skin color. Follow-up with your primary care provider if your pain persist over the course of the next week.

## 2023-09-16 NOTE — ED PROVIDER NOTES
[facility] ED  Emergency Department Encounter  Emergency Medicine Resident     Pt Percy Puga  MRN: [id number]  [address] [facility] [place] 1980  Date of evaluation: 9/16/23  PCP:  Priyanka Tirado MD  Note Started: 8:05 AM EDT      CHIEF COMPLAINT       Chief Complaint   Patient presents with    Elbow Pain     L elbow       HISTORY OF PRESENT ILLNESS  (Location/Symptom, Timing/Onset, Context/Setting, Quality, Duration, Modifying Factors, Severity.)      Cassandra Lehman is a 40 y.o. female who presents with left elbow pain and swelling. Patient states she hit it on the register at work couple days ago and noticed increased pain. There is swelling to the lateral portion of the elbow. She also notices tingling sensation down to her pinky finger when resting the elbow on the table. Denies any fevers or chills no nausea vomiting. She has full range of motion of the joint. PAST MEDICAL / SURGICAL / SOCIAL / FAMILY HISTORY      has a past medical history of Gastric reflux, Hernia of abdominal wall, History of anxiety, Hypertension, Migraine, Neuropathy, Obesities, morbid ([address]), TINO (obstructive sleep apnea), Suicide attempt ([address]), Thyroid disease, and Wears glasses. has a past surgical history that includes Tubal ligation; Upper gastrointestinal endoscopy (N/A, 12/7/2018); [place] tooth extraction; Sukhjinder-en-Y Gastric Bypass (04/01/2019); Sukhjinder-en-Y Gastric Bypass (N/A, 4/1/2019); Upper gastrointestinal endoscopy (N/A, 6/14/2019); Abdomen surgery; Cholecystectomy, laparoscopic (10/07/2019); Cholecystectomy, laparoscopic (N/A, 10/7/2019); Colonoscopy; Endoscopy, colon, diagnostic; lipectomy (04/01/2021); Abdominoplasty (04/01/2021); Umbilical hernia repair (04/01/2021); lipectomy (N/A, 4/1/2021); Abdominoplasty (N/A, 3/2/1726); and Umbilical hernia repair (N/A, 4/1/2021).       Social History     Socioeconomic History    Marital status:      Spouse name: Not on file    Number of children: Not on

## (undated) DEVICE — GAUZE,SPONGE,FLUFF,6"X6.75",STRL,5/TRAY: Brand: MEDLINE

## (undated) DEVICE — REDUCER: Brand: ENDOWRIST

## (undated) DEVICE — TOWEL,OR,DSP,ST,NATURAL,DLX,4/PK,20PK/CS: Brand: MEDLINE

## (undated) DEVICE — ADHESIVE SKIN CLSR 0.7ML TOP DERMBND ADV

## (undated) DEVICE — SPONGE LAP W18XL18IN WHT COT 4 PLY FLD STRUNG RADPQ DISP ST

## (undated) DEVICE — DRAIN SURG 19FR 100% SIL RADPQ RND CHN FULL FLUT

## (undated) DEVICE — 3 ML SYRINGE LUER-LOCK TIP: Brand: MONOJECT

## (undated) DEVICE — GLOVE ORANGE PI 8 1/2   MSG9085

## (undated) DEVICE — STRIP,CLOSURE,WOUND,MEDI-STRIP,1/2X4: Brand: MEDLINE

## (undated) DEVICE — GLOVE ORANGE PI 8   MSG9080

## (undated) DEVICE — STRIP SKIN CLSR W1XL5IN NYL REINF CURAD

## (undated) DEVICE — SUTURE SZ 0 27IN 5/8 CIR UR-6  TAPER PT VIOLET ABSRB VICRYL J603H

## (undated) DEVICE — SET DRN PVC DBL RND W/ TRCR 1/8 IN MID PERF 12 H PAT

## (undated) DEVICE — PROTECTOR ULN NRV PUR FOAM HK LOOP STRP ANATOMICALLY

## (undated) DEVICE — SUTURE ABSRB BRAID COAT VLT SH 3-0 27IN VCRL J311H

## (undated) DEVICE — MHPB GEN MINOR PACK: Brand: MEDLINE INDUSTRIES, INC.

## (undated) DEVICE — INSUFFLATION TUBING SET WITH FILTER, FUNNEL CONNECTOR AND LUER LOCK: Brand: JOSNOE MEDICAL INC

## (undated) DEVICE — SUTURE PERMA-HAND SZ 2-0 L30IN NONABSORBABLE BLK L26MM SH K833H

## (undated) DEVICE — GLOVE SURG SZ 6 THK91MIL LTX FREE SYN POLYISOPRENE ANTI

## (undated) DEVICE — 3M™ STERI-DRAPE™ INCISE DRAPE 1050 (60CM X 45CM): Brand: STERI-DRAPE™

## (undated) DEVICE — PAD,ABDOMINAL,5"X9",ST,LF,25/BX: Brand: MEDLINE INDUSTRIES, INC.

## (undated) DEVICE — SOLUTION IV IRRIG POUR BRL 0.9% SODIUM CHL 2F7124

## (undated) DEVICE — SUTURE ETHLN SZ 3-0 L18IN NONABSORBABLE BLK L24MM PS-1 3/8 1663G

## (undated) DEVICE — BINDER ABD 3XL H9XL71 82IN E UNISX 3 PNL

## (undated) DEVICE — Device

## (undated) DEVICE — STAPLER 60: Brand: SUREFORM

## (undated) DEVICE — TROCAR ENDOSCP L100MM DIA12MM BLDELSS OBT RADLUC STBL SL

## (undated) DEVICE — GEL US 20GM NONIRRITATING OVERWRAPPED FILE PCH TRNSMIT

## (undated) DEVICE — TOWEL,OR,DSP,ST,BLUE,STD,6/PK,12PK/CS: Brand: MEDLINE

## (undated) DEVICE — 3M™ IOBAN™ 2 ANTIMICROBIAL INCISE DRAPE 6650EZ: Brand: IOBAN™ 2

## (undated) DEVICE — GLOVE SURG SZ 65 THK91MIL LTX FREE SYN POLYISOPRENE

## (undated) DEVICE — 3M™ STERI-DRAPE™  POUCH WITH IOBAN™ 2 INCISE FILM 6657: Brand: STERI-DRAPE™ IOBAN™ 2

## (undated) DEVICE — PENCIL ES L3M BTTN SWCH HOLSTER W/ BLDE ELECTRD EDGE

## (undated) DEVICE — INTENDED FOR TISSUE SEPARATION, AND OTHER PROCEDURES THAT REQUIRE A SHARP SURGICAL BLADE TO PUNCTURE OR CUT.: Brand: BARD-PARKER ® CARBON RIB-BACK BLADES

## (undated) DEVICE — 3M™ TEGADERM™ I.V. ADVANCED SECUREMENT DRESSING, 1683, 2-1/2 IN X 2-3/4 IN, 6.5 CM X 7 CM, 100/CT 4CT/CASE: Brand: 3M™ TEGADERM™

## (undated) DEVICE — SUTURE VCRL SZ 3-0 L27IN ABSRB VLT L26MM SH 1/2 CIR J316H

## (undated) DEVICE — STRAP,POSITIONING,KNEE/BODY,FOAM,4X60": Brand: MEDLINE

## (undated) DEVICE — SKIN AFFIX SURG ADHESIVE 72/CS 0.55ML: Brand: MEDLINE

## (undated) DEVICE — COUNTER NDL 40 COUNT HLD 70 FOAM BLK ADH W/ MAG

## (undated) DEVICE — PREP SOL PVP IODINE 4%  4 OZ/BTL

## (undated) DEVICE — 3M™ WARMING BLANKET, UPPER BODY, 10 PER CASE, 42268: Brand: BAIR HUGGER™

## (undated) DEVICE — TIP COVER ACCESSORY

## (undated) DEVICE — TOTAL TRAY, 16FR 10ML SIL FOLEY, URN: Brand: MEDLINE

## (undated) DEVICE — TROCAR ENDOSCP L100MM DIA5MM BLDELSS STBL SL THRD OPT VW

## (undated) DEVICE — BAG SPEC REM 224ML W4XL6IN DIA10MM 1 HND GYN DISP ENDOPCH

## (undated) DEVICE — AEGIS 1" DISK 4MM HOLE, PEEL OPEN: Brand: MEDLINE

## (undated) DEVICE — GOWN,AURORA,NONREINFORCED,LARGE: Brand: MEDLINE

## (undated) DEVICE — SUTURE VCRL + SZ 2-0 L27IN ABSRB CLR CT-1 1/2 CIR TAPERCUT VCP259H

## (undated) DEVICE — SOLUTION ANTIFOG VIS SYS CLEARIFY LAPSCP

## (undated) DEVICE — SEAL

## (undated) DEVICE — TROCAR: Brand: KII FIOS FIRST ENTRY

## (undated) DEVICE — CHLORAPREP 26ML ORANGE

## (undated) DEVICE — GLOVE ORANGE PI 7   MSG9070

## (undated) DEVICE — GLOVE SURG SZ 5.5 L12IN FNGR THK9.4MIL STD WHT LTX FREE

## (undated) DEVICE — BLADELESS OBTURATOR: Brand: WECK VISTA

## (undated) DEVICE — 3M™ PRECISE™ VISTA DISPOSABLE SKIN STAPLER 3995: Brand: 3M™ PRECISE™

## (undated) DEVICE — 1000 S-DRAPE TOWEL DRAPE 10/BX: Brand: STERI-DRAPE™

## (undated) DEVICE — CANNULA IV 18GA L15IN BLNT FILL LUERLOCK HUB MJCT

## (undated) DEVICE — SUTURE PROL SZ 2-0 L18IN NONABSORBABLE BLU FS L26MM 3/8 CIR 8685H

## (undated) DEVICE — DRAIN SURG 19FR L025IN DIA63MM SIL CHN RND FULL FLUT CLS

## (undated) DEVICE — SYRINGE, LUER LOCK, 10ML: Brand: MEDLINE

## (undated) DEVICE — COVER,LIGHT HANDLE,FLX,2/PK: Brand: MEDLINE INDUSTRIES, INC.

## (undated) DEVICE — CANNULA SEAL

## (undated) DEVICE — INTENDED FOR TISSUE SEPARATION, AND OTHER PROCEDURES THAT REQUIRE A SHARP SURGICAL BLADE TO PUNCTURE OR CUT.: Brand: BARD-PARKER ® STAINLESS STEEL BLADES

## (undated) DEVICE — APPLICATOR MEDICATED 26 CC SOLUTION HI LT ORNG CHLORAPREP

## (undated) DEVICE — LEGGINGS, PAIR, 31X48, STERILE: Brand: MEDLINE

## (undated) DEVICE — SCISSOR SURG METZ CRV TIP

## (undated) DEVICE — SHEET,DRAPE,70X100,STERILE: Brand: MEDLINE

## (undated) DEVICE — DRAPE,T,LAPARO,TRANS,STERILE: Brand: MEDLINE

## (undated) DEVICE — ARM DRAPE

## (undated) DEVICE — SUTURE MCRYL SZ 4-0 L18IN ABSRB UD L16MM PC-3 3/8 CIR PRIM Y845G

## (undated) DEVICE — DEVICE TRCR 12X9X3IN WHT CLSR DISP OMNICLOSE

## (undated) DEVICE — DRESSING,GAUZE,XEROFORM,CURAD,5"X9",ST: Brand: CURAD

## (undated) DEVICE — RESERVOIR,SUCTION,100CC,SILICONE: Brand: MEDLINE

## (undated) DEVICE — 3M™ STERI-STRIP™ COMPOUND BENZOIN TINCTURE 40 BAGS/CARTON 4 CARTONS/CASE C1544: Brand: 3M™ STERI-STRIP™

## (undated) DEVICE — BLANKET WRM W29.9XL79.1IN UP BODY FORC AIR MISTRAL-AIR

## (undated) DEVICE — STAPLER INT L L25MM DIA5MM GI WHT TI BARIATRIC CIR CUT 2

## (undated) DEVICE — STAPLER 60 RELOAD BLUE: Brand: SUREFORM

## (undated) DEVICE — STANDARD HYPODERMIC NEEDLE,POLYPROPYLENE HUB: Brand: MONOJECT

## (undated) DEVICE — APPLIER CLP M L L11.4IN DIA10MM ENDOSCP ROT MULT FOR LIG

## (undated) DEVICE — SUTURE ABSORBABLE BRAIDED 2-0 CT-1 27 IN UD VICRYL J259H

## (undated) DEVICE — NEEDLE ECHOGENIC 20GA L4IN INSUL W/ 30DEG BVL EXTN SET

## (undated) DEVICE — STAPLER INT W25MM WHT TRNSOR CIR ANVIL W/ ADVANCING PROX

## (undated) DEVICE — SUTURE PERMAHAND SZ 3-0 L30IN NONABSORBABLE BLK SH L26MM K832H

## (undated) DEVICE — GARMENT,MEDLINE,DVT,INT,CALF,MED, GEN2: Brand: MEDLINE

## (undated) DEVICE — AGENT HEMSTAT W2XL14IN OXIDIZED REGENERATED CELOS ABSRB FOR

## (undated) DEVICE — E-Z CLEAN, NON-STICK, PTFE COATED, ELECTROSURGICAL BLADE ELECTRODE, 2.5 INCH (6.35 CM): Brand: EZ CLEAN

## (undated) DEVICE — COVER,MAYO STAND,STERILE: Brand: MEDLINE

## (undated) DEVICE — FORCEPS BX L240CM WRK CHN 2.8MM STD CAP W/ NDL MIC MESH

## (undated) DEVICE — DRESSING GERM 6-12FR DIA1IN CNTR H 4MM ANTIMIC PROTCT DISK

## (undated) DEVICE — 2DSM24 2-0 UND MONODERM 30X30: Brand: 2DSM24 2-0 UND MONODERM 30X30

## (undated) DEVICE — Z DISCONTINUED USE 2423037 APPLICATOR MEDICATED 3 CC CHLORHEXIDINE GLUC 2% CHLORAPREP

## (undated) DEVICE — SUTURE PERMAHAND SZ 0 L30IN NONABSORBABLE BLK L26MM SH 1/2 K834H

## (undated) DEVICE — SYRINGE, 10ML SALINE IN 10ML: Brand: MEDLINE

## (undated) DEVICE — SUTURE VCRL + SZ 0 L27IN ABSRB VLT L26MM UR-6 5/8 CIR VCP603H

## (undated) DEVICE — DRESSING TRNSPAR W5XL4.5IN FLM SHT SEMIPERMEABLE WIND

## (undated) DEVICE — GLOVE ORANGE PI 7 1/2   MSG9075

## (undated) DEVICE — Z INVALID PART NUMBER USE 2421675 SEALANT HEMSTAT TISS 2ML FIBRIN EVICEL: Type: IMPLANTABLE DEVICE | Status: NON-FUNCTIONAL

## (undated) DEVICE — SUTURE VCRL SZ 3-0 L27IN ABSRB UD L26MM SH 1/2 CIR J416H

## (undated) DEVICE — TUBING, SUCTION, 9/32" X 20', STRAIGHT: Brand: MEDLINE INDUSTRIES, INC.

## (undated) DEVICE — GOWN,AURORA,NONRNF,XL,30/CS: Brand: MEDLINE

## (undated) DEVICE — COVER LT HNDL BLU PLAS

## (undated) DEVICE — MARKER,SKIN,WI/RULER AND LABELS: Brand: MEDLINE

## (undated) DEVICE — VESSEL SEALER EXTEND: Brand: ENDOWRIST

## (undated) DEVICE — YANKAUER,POOLE TIP,STERILE,50/CS: Brand: MEDLINE

## (undated) DEVICE — MASTISOL ADHESIVE AMPULE

## (undated) DEVICE — SUTURE MCRYL + SZ 4 0 L18IN ABSRB UD PC 3 L16MM 3 8 CIR PRIM MCP845G

## (undated) DEVICE — SUTURE PDS II SZ 0 L60IN ABSRB VLT L65MM TP-1 1/2 CIR Z991G

## (undated) DEVICE — ELECTRODE PT RET AD L9FT HI MOIST COND ADH HYDRGEL CORDED

## (undated) DEVICE — STAPLER 60 RELOAD WHITE: Brand: SUREFORM

## (undated) DEVICE — BINDER ABD 2XL W9IN CIRC 62 73IN 3 PNL E HK AND LOOP CLSR W